# Patient Record
Sex: FEMALE | Race: WHITE | Employment: UNEMPLOYED | URBAN - METROPOLITAN AREA
[De-identification: names, ages, dates, MRNs, and addresses within clinical notes are randomized per-mention and may not be internally consistent; named-entity substitution may affect disease eponyms.]

---

## 2018-01-04 ENCOUNTER — APPOINTMENT (OUTPATIENT)
Dept: GENERAL RADIOLOGY | Age: 83
End: 2018-01-04
Attending: EMERGENCY MEDICINE
Payer: MEDICARE

## 2018-01-04 ENCOUNTER — HOSPITAL ENCOUNTER (OUTPATIENT)
Age: 83
Setting detail: OBSERVATION
Discharge: HOME | End: 2018-01-05
Attending: EMERGENCY MEDICINE | Admitting: HOSPITALIST
Payer: MEDICARE

## 2018-01-04 DIAGNOSIS — R00.1 BRADYCARDIA: ICD-10-CM

## 2018-01-04 DIAGNOSIS — R55 SYNCOPE AND COLLAPSE: Primary | ICD-10-CM

## 2018-01-04 PROBLEM — I10 ESSENTIAL HYPERTENSION: Status: ACTIVE | Noted: 2018-01-04

## 2018-01-04 LAB
ALBUMIN SERPL-MCNC: 3.2 G/DL (ref 3.5–5)
ALBUMIN/GLOB SERPL: 0.7 {RATIO} (ref 1.1–2.2)
ALP SERPL-CCNC: 91 U/L (ref 45–117)
ALT SERPL-CCNC: 34 U/L (ref 12–78)
ANION GAP SERPL CALC-SCNC: 4 MMOL/L (ref 5–15)
APPEARANCE UR: CLEAR
AST SERPL-CCNC: 49 U/L (ref 15–37)
ATRIAL RATE: 49 BPM
BACTERIA URNS QL MICRO: NEGATIVE /HPF
BASOPHILS # BLD: 0 K/UL (ref 0–0.1)
BASOPHILS NFR BLD: 0 % (ref 0–1)
BILIRUB SERPL-MCNC: 0.4 MG/DL (ref 0.2–1)
BILIRUB UR QL: NEGATIVE
BUN SERPL-MCNC: 12 MG/DL (ref 6–20)
BUN/CREAT SERPL: 11 (ref 12–20)
CALCIUM SERPL-MCNC: 8.1 MG/DL (ref 8.5–10.1)
CALCULATED P AXIS, ECG09: 61 DEGREES
CALCULATED R AXIS, ECG10: 1 DEGREES
CALCULATED T AXIS, ECG11: 26 DEGREES
CHLORIDE SERPL-SCNC: 100 MMOL/L (ref 97–108)
CK MB CFR SERPL CALC: NORMAL % (ref 0–2.5)
CK MB CFR SERPL CALC: NORMAL % (ref 0–2.5)
CK MB SERPL-MCNC: <1 NG/ML (ref 5–25)
CK MB SERPL-MCNC: <1 NG/ML (ref 5–25)
CK SERPL-CCNC: 143 U/L (ref 26–192)
CK SERPL-CCNC: 152 U/L (ref 26–192)
CO2 SERPL-SCNC: 30 MMOL/L (ref 21–32)
COLOR UR: ABNORMAL
CREAT SERPL-MCNC: 1.05 MG/DL (ref 0.55–1.02)
DIAGNOSIS, 93000: NORMAL
EOSINOPHIL # BLD: 0 K/UL (ref 0–0.4)
EOSINOPHIL NFR BLD: 0 % (ref 0–7)
EPITH CASTS URNS QL MICRO: ABNORMAL /LPF
ERYTHROCYTE [DISTWIDTH] IN BLOOD BY AUTOMATED COUNT: 13.8 % (ref 11.5–14.5)
GLOBULIN SER CALC-MCNC: 4.6 G/DL (ref 2–4)
GLUCOSE SERPL-MCNC: 117 MG/DL (ref 65–100)
GLUCOSE UR STRIP.AUTO-MCNC: NEGATIVE MG/DL
HCT VFR BLD AUTO: 36.9 % (ref 35–47)
HGB BLD-MCNC: 12.4 G/DL (ref 11.5–16)
HGB UR QL STRIP: ABNORMAL
HYALINE CASTS URNS QL MICRO: ABNORMAL /LPF (ref 0–5)
KETONES UR QL STRIP.AUTO: NEGATIVE MG/DL
LEUKOCYTE ESTERASE UR QL STRIP.AUTO: NEGATIVE
LYMPHOCYTES # BLD: 1.4 K/UL (ref 0.8–3.5)
LYMPHOCYTES NFR BLD: 33 % (ref 12–49)
MAGNESIUM SERPL-MCNC: 2.3 MG/DL (ref 1.6–2.4)
MCH RBC QN AUTO: 29.2 PG (ref 26–34)
MCHC RBC AUTO-ENTMCNC: 33.6 G/DL (ref 30–36.5)
MCV RBC AUTO: 86.8 FL (ref 80–99)
MONOCYTES # BLD: 0.4 K/UL (ref 0–1)
MONOCYTES NFR BLD: 10 % (ref 5–13)
NEUTS SEG # BLD: 2.3 K/UL (ref 1.8–8)
NEUTS SEG NFR BLD: 57 % (ref 32–75)
NITRITE UR QL STRIP.AUTO: NEGATIVE
P-R INTERVAL, ECG05: 212 MS
PH UR STRIP: 6 [PH] (ref 5–8)
PLATELET # BLD AUTO: 156 K/UL (ref 150–400)
POTASSIUM SERPL-SCNC: 3.2 MMOL/L (ref 3.5–5.1)
PROT SERPL-MCNC: 7.8 G/DL (ref 6.4–8.2)
PROT UR STRIP-MCNC: NEGATIVE MG/DL
Q-T INTERVAL, ECG07: 498 MS
QRS DURATION, ECG06: 96 MS
QTC CALCULATION (BEZET), ECG08: 449 MS
RBC # BLD AUTO: 4.25 M/UL (ref 3.8–5.2)
RBC #/AREA URNS HPF: ABNORMAL /HPF (ref 0–5)
SODIUM SERPL-SCNC: 134 MMOL/L (ref 136–145)
SP GR UR REFRACTOMETRY: 1.01 (ref 1–1.03)
TROPONIN I SERPL-MCNC: <0.04 NG/ML
TROPONIN I SERPL-MCNC: <0.04 NG/ML
UA: UC IF INDICATED,UAUC: ABNORMAL
UROBILINOGEN UR QL STRIP.AUTO: 0.2 EU/DL (ref 0.2–1)
VENTRICULAR RATE, ECG03: 49 BPM
WBC # BLD AUTO: 4.1 K/UL (ref 3.6–11)
WBC URNS QL MICRO: ABNORMAL /HPF (ref 0–4)

## 2018-01-04 PROCEDURE — 99218 HC RM OBSERVATION: CPT

## 2018-01-04 PROCEDURE — 84484 ASSAY OF TROPONIN QUANT: CPT | Performed by: EMERGENCY MEDICINE

## 2018-01-04 PROCEDURE — 96360 HYDRATION IV INFUSION INIT: CPT

## 2018-01-04 PROCEDURE — 36415 COLL VENOUS BLD VENIPUNCTURE: CPT | Performed by: EMERGENCY MEDICINE

## 2018-01-04 PROCEDURE — 85025 COMPLETE CBC W/AUTO DIFF WBC: CPT | Performed by: EMERGENCY MEDICINE

## 2018-01-04 PROCEDURE — 81001 URINALYSIS AUTO W/SCOPE: CPT | Performed by: EMERGENCY MEDICINE

## 2018-01-04 PROCEDURE — 71045 X-RAY EXAM CHEST 1 VIEW: CPT

## 2018-01-04 PROCEDURE — 74011250637 HC RX REV CODE- 250/637: Performed by: HOSPITALIST

## 2018-01-04 PROCEDURE — 96361 HYDRATE IV INFUSION ADD-ON: CPT

## 2018-01-04 PROCEDURE — 82550 ASSAY OF CK (CPK): CPT | Performed by: EMERGENCY MEDICINE

## 2018-01-04 PROCEDURE — 83735 ASSAY OF MAGNESIUM: CPT | Performed by: EMERGENCY MEDICINE

## 2018-01-04 PROCEDURE — 80053 COMPREHEN METABOLIC PANEL: CPT | Performed by: EMERGENCY MEDICINE

## 2018-01-04 PROCEDURE — 99285 EMERGENCY DEPT VISIT HI MDM: CPT

## 2018-01-04 PROCEDURE — 74011250637 HC RX REV CODE- 250/637: Performed by: EMERGENCY MEDICINE

## 2018-01-04 PROCEDURE — 74011250636 HC RX REV CODE- 250/636: Performed by: HOSPITALIST

## 2018-01-04 PROCEDURE — 93005 ELECTROCARDIOGRAM TRACING: CPT

## 2018-01-04 RX ORDER — PRAVASTATIN SODIUM 40 MG/1
40 TABLET ORAL
COMMUNITY
End: 2021-12-08

## 2018-01-04 RX ORDER — CELECOXIB 200 MG/1
200 CAPSULE ORAL
COMMUNITY
End: 2021-12-08

## 2018-01-04 RX ORDER — CEFUROXIME AXETIL 250 MG/1
250 TABLET ORAL 2 TIMES DAILY
COMMUNITY
Start: 2018-01-01 | End: 2021-12-08

## 2018-01-04 RX ORDER — CEFUROXIME AXETIL 250 MG/1
250 TABLET ORAL 2 TIMES DAILY
Status: DISCONTINUED | OUTPATIENT
Start: 2018-01-04 | End: 2018-01-04

## 2018-01-04 RX ORDER — ONDANSETRON 2 MG/ML
4 INJECTION INTRAMUSCULAR; INTRAVENOUS
Status: DISCONTINUED | OUTPATIENT
Start: 2018-01-04 | End: 2018-01-05 | Stop reason: HOSPADM

## 2018-01-04 RX ORDER — SODIUM CHLORIDE 0.9 % (FLUSH) 0.9 %
5-10 SYRINGE (ML) INJECTION EVERY 8 HOURS
Status: DISCONTINUED | OUTPATIENT
Start: 2018-01-04 | End: 2018-01-05 | Stop reason: HOSPADM

## 2018-01-04 RX ORDER — POTASSIUM CHLORIDE 750 MG/1
40 TABLET, FILM COATED, EXTENDED RELEASE ORAL
Status: COMPLETED | OUTPATIENT
Start: 2018-01-04 | End: 2018-01-04

## 2018-01-04 RX ORDER — CEFUROXIME AXETIL 250 MG/1
250 TABLET ORAL 2 TIMES DAILY
Status: DISCONTINUED | OUTPATIENT
Start: 2018-01-04 | End: 2018-01-05 | Stop reason: HOSPADM

## 2018-01-04 RX ORDER — ATENOLOL 25 MG/1
25 TABLET ORAL DAILY
COMMUNITY
End: 2018-01-05

## 2018-01-04 RX ORDER — FUROSEMIDE 40 MG/1
40 TABLET ORAL
COMMUNITY
End: 2021-12-08

## 2018-01-04 RX ORDER — ACETAMINOPHEN 325 MG/1
650 TABLET ORAL
Status: DISCONTINUED | OUTPATIENT
Start: 2018-01-04 | End: 2018-01-05 | Stop reason: HOSPADM

## 2018-01-04 RX ORDER — VALSARTAN 40 MG/1
40 TABLET ORAL DAILY
Status: DISCONTINUED | OUTPATIENT
Start: 2018-01-05 | End: 2018-01-05 | Stop reason: HOSPADM

## 2018-01-04 RX ORDER — PRAVASTATIN SODIUM 40 MG/1
40 TABLET ORAL
Status: DISCONTINUED | OUTPATIENT
Start: 2018-01-04 | End: 2018-01-05 | Stop reason: HOSPADM

## 2018-01-04 RX ORDER — SODIUM CHLORIDE 0.9 % (FLUSH) 0.9 %
5-10 SYRINGE (ML) INJECTION AS NEEDED
Status: DISCONTINUED | OUTPATIENT
Start: 2018-01-04 | End: 2018-01-05 | Stop reason: HOSPADM

## 2018-01-04 RX ORDER — ASPIRIN 81 MG/1
81 TABLET ORAL DAILY
Status: DISCONTINUED | OUTPATIENT
Start: 2018-01-05 | End: 2018-01-05 | Stop reason: HOSPADM

## 2018-01-04 RX ORDER — CELECOXIB 100 MG/1
200 CAPSULE ORAL
Status: DISCONTINUED | OUTPATIENT
Start: 2018-01-04 | End: 2018-01-05 | Stop reason: HOSPADM

## 2018-01-04 RX ORDER — ASPIRIN 81 MG/1
81 TABLET ORAL DAILY
COMMUNITY
End: 2021-12-08

## 2018-01-04 RX ORDER — NYSTATIN 100000 [USP'U]/G
POWDER TOPICAL 2 TIMES DAILY
Status: DISCONTINUED | OUTPATIENT
Start: 2018-01-04 | End: 2018-01-05 | Stop reason: HOSPADM

## 2018-01-04 RX ORDER — POTASSIUM CHLORIDE 750 MG/1
40 TABLET, FILM COATED, EXTENDED RELEASE ORAL
Status: DISCONTINUED | OUTPATIENT
Start: 2018-01-04 | End: 2018-01-04

## 2018-01-04 RX ADMIN — Medication 10 ML: at 21:45

## 2018-01-04 RX ADMIN — PRAVASTATIN SODIUM 40 MG: 40 TABLET ORAL at 21:45

## 2018-01-04 RX ADMIN — CEFUROXIME AXETIL 250 MG: 250 TABLET ORAL at 21:45

## 2018-01-04 RX ADMIN — NYSTATIN: 100000 POWDER TOPICAL at 13:24

## 2018-01-04 RX ADMIN — POTASSIUM CHLORIDE 40 MEQ: 750 TABLET, FILM COATED, EXTENDED RELEASE ORAL at 10:20

## 2018-01-04 RX ADMIN — CEFUROXIME AXETIL 250 MG: 250 TABLET ORAL at 13:24

## 2018-01-04 RX ADMIN — SODIUM CHLORIDE 500 ML: 900 INJECTION, SOLUTION INTRAVENOUS at 12:16

## 2018-01-04 NOTE — H&P
Hospitalist Admission Note    NAME: Mia Campos   :  1933   MRN:  [id number]     Date/Time:  2018 10:44 AM    Patient PCP: None local  ______________________________________________________________________   Assessment & Plan:  Near syncope, suspect vasovagal   Bradycardia HR in 40s initially, has improved to upper 50s and 60s since without intervention, on atenolol chronically (dose was decreased from 50 to 25mg over a year ago)  Hypokalemia, 3.2  Intertrigo breast folds. Recent shingles under left breast  Recent sinus infection, on cefuroxime  Chronic intermittent diarrhea  HTN  Hyperlipidemia  Bilateral knee arthritis with chronic b/l lower leg edema x 1 year. Currently minimal edema    --observation, monitor on tele. Not orthostatic. --Stop atenolol (already took dose today). Replace with losartan  --echo, cardiology consult, check tsh  --kdur 40meq  --continue cefuroxime  --continue pravastatin, aspirin, celecoxib prn.  --nystatin    Body mass index is 30.65 kg/(m^2). DVT prophylaxis: SCD  Surrogate decision maker:  Daughter Gracie Jordan        Subjective:   CHIEF COMPLAINT:  Near syncope, weakness    HISTORY OF PRESENT ILLNESS:     Mia Campos is a 80 y.o.  female from [place], [place] accompanied by daughter Gracie Jordan and son in law with whom she lives. Patient with HTN, hyperlipidemia, b/l knee arthritis causing lower leg edema and on lasix prn who flew to [place] on [place]. Two days later got sick with purulent nasal discharge, cough with yellow sputum. Started on cefuroxime by [facility] on 2018 for 10 days. Appetite poor with little PO intake since URI began with throat and tongue been feeling very dry and burning discomfort on tongue and stomach. Patient was boarding plane with daughter and son in law, walking to back of plane when felt her mouth was very dry and complained of being very hot.   Became nauseated and vomited x 1 the half energy bar she had eaten earlier and was lightheaded, felt like she was going to faint and threw water on her face to prevent from passing out. Has problem with passing out whenever she vomits since teenager. Family said she was very pale, turning head side to side. Did not pass out completely. Assessed by a doctor on plane and felt not fit to fly. She complains of feeling very tired and weak. Per son in law, also complained of some abdominal pain earlier as well. Currently having some loose stool (chronic) for which she takes Pepto Bismol frequently. EMS reported heart rate was in 40s but blood pressure normal and patient able to answer questions. No seizure activity. No CP, SOB. Seen in ER in 2015 for similar symptoms. On arrival, HR 49 but since HR slowly improving to 50s and low 60s. Denies any hx of MI, CHF, seizure, stroke/TIA. Past Medical History:   Diagnosis Date    Hyperlipidemia     Hypertension     Uterine cancer ([address] 1998      Past Surgical History:   Procedure Laterality Date    HX HYSTERECTOMY       Social History   Substance Use Topics    Smoking status: Never Smoker    Smokeless tobacco: Not on file    Alcohol use No   Lives with daughter Paulina Sierra and son in law in [place], [place]      Family History   Problem Relation Age of Onset    Tuberculosis Mother     Tuberculosis Father      Allergies   Allergen Reactions    Penicillins Other (comments)     Isn't karthikeyan to recall recalls      Seafood Hives    Sulfa (Sulfonamide Antibiotics) Other (comments)     Isn't able to recall reaction at this time. Prior to Admission medications    Medication Sig Start Date End Date Taking? Authorizing Provider   pravastatin (PRAVACHOL) 40 mg tablet Take 40 mg by mouth nightly. Yes Historical Provider   atenolol (TENORMIN) 25 mg tablet Take 25 mg by mouth daily. Yes Historical Provider   furosemide (LASIX) 40 mg tablet Take 40 mg by mouth daily as needed for Other (swelling).    Yes Historical Provider   celecoxib (CELEBREX) 200 mg capsule Take 200 mg by mouth daily as needed for Pain. Yes Historical Provider   cefUROXime (CEFTIN) 250 mg tablet Take 250 mg by mouth two (2) times a day. 18  Yes Historical Provider   aspirin delayed-release 81 mg tablet Take 81 mg by mouth daily. Yes Historical Provider   ondansetron hcl (ZOFRAN, AS HYDROCHLORIDE,) 4 mg tablet Take 1 Tab by mouth every eight (8) hours as needed for Nausea. 12/23/15   Aiyana Sneed MD   meclizine (ANTIVERT) 25 mg tablet Take 1 Tab by mouth three (3) times daily as needed for Dizziness. 12/23/15   Aiyana Sneed MD   promethazine (PHENERGAN) 25 mg suppository Insert 1 Suppository into rectum every six (6) hours as needed for Nausea. 12/23/15   Aiyana Sneed MD     REVIEW OF SYSTEMS:  POSITIVE= Bold.   Negative = normal text  General:  fever, chills, sweats, generalized weakness, weight loss/gain, loss of appetite  Eyes:  blurred vision, eye pain, loss of vision, diplopia  Ear Nose and Throat:  rhinorrhea, pharyngitis, nasal congestion and discharge  Respiratory:   cough, sputum production, SOB, wheezing, BALTAZAR, pleuritic pain  Cardiology:  chest pain, palpitations, orthopnea, PND, edema, syncope   Gastrointestinal:  abdominal pain, N/V, dysphagia, diarrhea, constipation, bleeding  Genitourinary:  frequency, urgency, dysuria, hematuria, incontinence  Muskuloskeletal :  Arthralgia--knees, myalgia  Hematology:  easy bruising, bleeding, lymphadenopathy  Dermatological:  rash, ulceration, pruritis  Endocrine:  hot flashes or polydipsia  Neurological:  headache, dizziness, confusion, focal weakness, paresthesia, memory loss, gait disturbance  Psychological: anxiety, depression, agitation      Objective:   VITALS:    Visit Vitals    /50    Pulse (!) 50    Temp 98.2 °F (36.8 °C)    Resp 15    Wt 81 kg (178 lb 9.2 oz)    SpO2 96%    BMI 30.65 kg/m2     Temp (24hrs), Av.2 °F (36.8 °C), Min:98.2 °F (36.8 °C), Max:98.2 °F (36.8 °C)    Body mass index is 30.65 kg/(m^2). PHYSICAL EXAM:    General:    Alert, well nourished female, cooperative, no distress, appears stated age. HEENT: Atraumatic, anicteric sclerae, pink conjunctivae     No oral ulcers, dry tongue, throat clear. Hearing intact. Neck:  Supple, symmetrical,  thyroid: non tender. No bruit. No lymphadenopathy  Lungs:   Clear to auscultation bilaterally. No Wheezing or Rhonchi. No rales. Chest wall:  No tenderness  No Accessory muscle use. Heart:   Regular  rhythm,  No  murmur   No gallop. No edema. Abdomen:   Soft, non-tender. Not distended. Bowel sounds normal. No masses  Extremities: No cyanosis. No clubbing. Trace swelling over b/l patellar joints  Skin:     Not pale Not Jaundiced +intertrigo under left breast, mild intertrigo starting under right breast.  Several seborrheic keratoses on chest and back  Psych:  Good insight. Not depressed. Not anxious or agitated. Neurologic: EOMs intact. No facial asymmetry. No aphasia or slurred speech. Symmetrical strength, Alert and oriented X 3.      IMAGING RESULTS:   []       I have personally reviewed the actual   []     CXR  []     CT scan  CXR:  CT :  EKG:  Sinus samira 49, 1st AV block, LVH with repolarization  ________________________________________________________________________  Care Plan discussed with:    Comments   Patient y    Family  y Daughter and son in law   RN     Care Manager                    Consultant:      ________________________________________________________________________  Prophylaxis:  GI none   DVT SCD   ________________________________________________________________________  Recommended Disposition:   Home with Family y   HH/PT/OT/RN    SNF/LTC    MICHAEL    ________________________________________________________________________  Code Status:  Full Code y   DNR/DNI    ________________________________________________________________________  TOTAL TIME:  60 minutes      Comments    y Reviewed previous records   ______________________________________________________________________  Phoebe Damon MD      Procedures: see electronic medical records for all procedures/Xrays and details which were not copied into this note but were reviewed prior to creation of Plan. LAB DATA REVIEWED:    Recent Results (from the past 24 hour(s))   EKG, 12 LEAD, INITIAL    Collection Time: 01/04/18  8:43 AM   Result Value Ref Range    Ventricular Rate 49 BPM    Atrial Rate 49 BPM    P-R Interval 212 ms    QRS Duration 96 ms    Q-T Interval 498 ms    QTC Calculation (Bezet) 449 ms    Calculated P Axis 61 degrees    Calculated R Axis 1 degrees    Calculated T Axis 26 degrees    Diagnosis       Sinus bradycardia with 1st degree AV block  Left ventricular hypertrophy with repolarization abnormality    When compared with ECG of 22-DEC-2015 21:10,  No significant change was found  Confirmed by Karma Hernandez ([id number]) on 1/4/2018 9:29:28 AM     CBC WITH AUTOMATED DIFF    Collection Time: 01/04/18  8:52 AM   Result Value Ref Range    WBC 4.1 3.6 - 11.0 K/uL    RBC 4.25 3.80 - 5.20 M/uL    HGB 12.4 11.5 - 16.0 g/dL    HCT 36.9 35.0 - 47.0 %    MCV 86.8 80.0 - 99.0 FL    MCH 29.2 26.0 - 34.0 PG    MCHC 33.6 30.0 - 36.5 g/dL    RDW 13.8 11.5 - 14.5 %    PLATELET 832 816 - 891 K/uL    NEUTROPHILS 57 32 - 75 %    LYMPHOCYTES 33 12 - 49 %    MONOCYTES 10 5 - 13 %    EOSINOPHILS 0 0 - 7 %    BASOPHILS 0 0 - 1 %    ABS. NEUTROPHILS 2.3 1.8 - 8.0 K/UL    ABS. LYMPHOCYTES 1.4 0.8 - 3.5 K/UL    ABS. MONOCYTES 0.4 0.0 - 1.0 K/UL    ABS. EOSINOPHILS 0.0 0.0 - 0.4 K/UL    ABS.  BASOPHILS 0.0 0.0 - 0.1 K/UL   METABOLIC PANEL, COMPREHENSIVE    Collection Time: 01/04/18  8:52 AM   Result Value Ref Range    Sodium 134 (L) 136 - 145 mmol/L    Potassium 3.2 (L) 3.5 - 5.1 mmol/L    Chloride 100 97 - 108 mmol/L    CO2 30 21 - 32 mmol/L    Anion gap 4 (L) 5 - 15 mmol/L    Glucose 117 (H) 65 - 100 mg/dL    BUN 12 6 - 20 MG/DL    Creatinine 1.05 (H) 0.55 - 1.02 MG/DL    BUN/Creatinine ratio 11 (L) 12 - 20      GFR est AA >60 >60 ml/min/1.73m2    GFR est non-AA 50 (L) >60 ml/min/1.73m2    Calcium 8.1 (L) 8.5 - 10.1 MG/DL    Bilirubin, total 0.4 0.2 - 1.0 MG/DL    ALT (SGPT) 34 12 - 78 U/L    AST (SGOT) 49 (H) 15 - 37 U/L    Alk.  phosphatase 91 45 - 117 U/L    Protein, total 7.8 6.4 - 8.2 g/dL    Albumin 3.2 (L) 3.5 - 5.0 g/dL    Globulin 4.6 (H) 2.0 - 4.0 g/dL    A-G Ratio 0.7 (L) 1.1 - 2.2     CK W/ CKMB & INDEX    Collection Time: 01/04/18  8:52 AM   Result Value Ref Range     26 - 192 U/L    CK - MB <1.0 <3.6 NG/ML    CK-MB Index Cannot be calculated 0 - 2.5     MAGNESIUM    Collection Time: 01/04/18  8:52 AM   Result Value Ref Range    Magnesium 2.3 1.6 - 2.4 mg/dL   TROPONIN I    Collection Time: 01/04/18  8:52 AM   Result Value Ref Range    Troponin-I, Qt. <0.04 <0.05 ng/mL

## 2018-01-04 NOTE — IP AVS SNAPSHOT
Summary of Care Report The Summary of Care report has been created to help improve care coordination. Users with access to [employer] or [address] (Web-based application) may access additional patient information including the Discharge Summary. If you are not currently a [address] user and need more information, please call the number listed below in the [place] [address] section and ask to be connected with Medical Records. Facility Information Name Address Phone [address] [address] [phone] Patient Information Patient Name Sex  Jolynn Mcmahan ([id number]) Female 1933 Discharge Information Admitting Provider Service Area Unit  
 Landon Nogueira MD / [phone] [facility] 2 General Surgery / [phone] Discharge Provider Discharge Date/Time Discharge Disposition Destination (none) 2018 (Pending) AHR (none) Patient Language Language ENGLISH [13] Hospital Problems as of 2018  Reviewed: 2018  7:35 AM by Landon Nogueira MD  
  
  
  
 Class Noted - Resolved Last Modified POA Active Problems Essential hypertension  2018 - Present 2018 by Landon Nogueira MD Yes Entered by Cynthia Iverson MD  
  Sinus bradycardia  2018 - Present 2018 by Landon Nogueira MD Yes Entered by Landon Nogueira MD  
  * (Principal)Near syncope  2018 - Present 2018 by Landon Nogueira MD Yes Entered by Landon Nogueira MD  
  
Non-Hospital Problems as of 2018  Reviewed: 2018  7:35 AM by Landon Nogueira MD  
 None You are allergic to the following Allergen Reactions Seafood Hives  
 shellfish  
    
 Sulfa (Sulfonamide Antibiotics) Other (comments) Isn't able to recall reaction at this time.    
  
  
Current Discharge Medication List  
  
 START taking these medications Dose & Instructions Dispensing Information Comments  
 potassium chloride SR 20 mEq tablet Commonly known as:  K-TAB Dose:  20 mEq Take 1 Tab by mouth daily. Quantity:  5 Tab Refills:  0  
   
 valsartan 40 mg tablet Commonly known as:  DIOVAN Start taking on:  1/6/2018 Dose:  40 mg Take 1 Tab by mouth daily. Quantity:  30 Tab Refills:  0 CONTINUE these medications which have NOT CHANGED Dose & Instructions Dispensing Information Comments  
 aspirin delayed-release 81 mg tablet Dose:  81 mg Take 81 mg by mouth daily. Refills:  0  
   
 cefUROXime 250 mg tablet Commonly known as:  CEFTIN Dose:  250 mg Take 250 mg by mouth two (2) times a day. Refills:  0  
   
 celecoxib 200 mg capsule Commonly known as:  CELEBREX Dose:  200 mg Take 200 mg by mouth daily as needed for Pain. Refills:  0  
   
 LASIX 40 mg tablet Generic drug:  furosemide Dose:  40 mg Take 40 mg by mouth daily as needed for Other (swelling). Refills:  0  
   
 pravastatin 40 mg tablet Commonly known as:  PRAVACHOL Dose:  40 mg Take 40 mg by mouth nightly. Refills:  0 STOP taking these medications Comments  
 atenolol 25 mg tablet Commonly known as:  TENORMIN Follow-up Information Follow up With Details Comments Contact Info Provider Unknown   Patient not available to ask Discharge Instructions None Chart Review Routing History Recipient Method Report Sent By Merlin Larsen Provider Shaila, MD  
Patient not available to ask [address] [facility] IP Auto Routed Notes Judith Figueroa MD [[id number]] 1/5/2018  1:03 PM 01/05/2018 Provider Unknown, MD  
Patient not available to ask [address] [facility] IP Auto Routed Notes Judith Figueroa MD [[id number]] 1/5/2018  1:04 PM 01/05/2018

## 2018-01-04 NOTE — CONSULTS
Subjective:      Date of  Admission: 1/4/2018  8:36 AM     Admission type:Emergency    Mia Campos is a 80 y.o. female came to ER with c/o feeling dizzy, presyncope that happened while about to fly to [place]. Has been feeling sick for last wk or so and has been on abx. Visiting from [place] [id number]. Felt nauseous and vomited once. Similar to previous episodes. Feels great now other than URI. Never lost consciousness. She denies any other cardiac symptoms. Reportedly on arrival to ER found to have heart rate in 40's with normal hemodynamics. Has been on atenolol for BP. Patient Active Problem List    Diagnosis Date Noted    Syncope 01/04/2018      Not On File [facility]  Past Medical History:   Diagnosis Date    Hyperlipidemia     Hypertension     [address]ine cancer ([facility] [address] 1998      Past Surgical History:   Procedure Laterality Date    HX HYSTERECTOMY       Allergies   Allergen Reactions    Seafood Hives     shellfish    Sulfa (Sulfonamide Antibiotics) Other (comments)     Isn't able to recall reaction at this time.        Family History   Problem Relation Age of Onset    Tuberculosis Mother     Tuberculosis Father       Current Facility-Administered Medications   Medication Dose Route Frequency    sodium chloride (NS) flush 5-10 mL  5-10 mL IntraVENous Q8H    sodium chloride (NS) flush 5-10 mL  5-10 mL IntraVENous PRN    acetaminophen (TYLENOL) tablet 650 mg  650 mg Oral Q4H PRN    ondansetron (ZOFRAN) injection 4 mg  4 mg IntraVENous Q6H PRN    nystatin (MYCOSTATIN) 100,000 unit/gram powder   Topical BID    [START ON 1/5/2018] aspirin delayed-release tablet 81 mg  81 mg Oral DAILY    celecoxib (CELEBREX) capsule 200 mg  200 mg Oral DAILY PRN    pravastatin (PRAVACHOL) tablet 40 mg  40 mg Oral QHS    cefUROXime (CEFTIN) tablet 250 mg  250 mg Oral BID    [START ON 1/5/2018] valsartan (DIOVAN) tablet 40 mg  40 mg Oral DAILY    benzocaine-menthol (CHLORASEPTIC MAX) lozenge 1 Lozenge  1 Lozenge Oral Q2H PRN         Review of Symptoms:  Constitutional: negative  Eyes: negative  Ears, nose, mouth, throat, and face: negative  Respiratory: No exertional dyspnea, orthopnea, PND. Cardiovascular: No CP, palpitations, sweating, lower extremity swelling. Gastrointestinal: No nausea, vomiting, diarrhea, constipation, abdominal pain, hematemesis, melena, hematochezia  Genitourinary:No urinary complaints. Musculoskeletal:negative  Neurological: negative  Behvioral/Psych: negative  Endocrine: negative     Subjective:      Visit Vitals    /49    Pulse 65    Temp 98.2 °F (36.8 °C)    Resp 16    Wt 178 lb 9.2 oz (81 kg)    SpO2 99%    BMI 30.65 kg/m2       Physical Exam  Abdomen: soft, non-tender.  Bowel sounds normal.   Extremities: no cyanosis or edema  Heart: regular rate and rhythm, S1, S2 normal, no murmur, click, rub or gallop  Lungs: clear to auscultation bilaterally  Neck: supple, no carotid bruit and no JVD  Neurologic: Grossly normal  Pulses: 2+       Cardiographics    Telemetry: normal sinus rhythm    ECG: normal sinus rhythm, LVH    Echocardiogram: Not done    Labs:   Recent Results (from the past 24 hour(s))   EKG, 12 LEAD, INITIAL    Collection Time: 01/04/18  8:43 AM   Result Value Ref Range    Ventricular Rate 49 BPM    Atrial Rate 49 BPM    P-R Interval 212 ms    QRS Duration 96 ms    Q-T Interval 498 ms    QTC Calculation (Bezet) 449 ms    Calculated P Axis 61 degrees    Calculated R Axis 1 degrees    Calculated T Axis 26 degrees    Diagnosis       Sinus bradycardia with 1st degree AV block  Left ventricular hypertrophy with repolarization abnormality    When compared with ECG of 22-DEC-2015 21:10,  No significant change was found  Confirmed by Elayne Martinez ([id number]) on 1/4/2018 9:29:28 AM     CBC WITH AUTOMATED DIFF    Collection Time: 01/04/18  8:52 AM   Result Value Ref Range    WBC 4.1 3.6 - 11.0 K/uL    RBC 4.25 3.80 - 5.20 M/uL    HGB 12.4 11.5 - 16.0 g/dL    HCT 36.9 35.0 - 47.0 %    MCV 86.8 80.0 - 99.0 FL    MCH 29.2 26.0 - 34.0 PG    MCHC 33.6 30.0 - 36.5 g/dL    RDW 13.8 11.5 - 14.5 %    PLATELET 114 882 - 091 K/uL    NEUTROPHILS 57 32 - 75 %    LYMPHOCYTES 33 12 - 49 %    MONOCYTES 10 5 - 13 %    EOSINOPHILS 0 0 - 7 %    BASOPHILS 0 0 - 1 %    ABS. NEUTROPHILS 2.3 1.8 - 8.0 K/UL    ABS. LYMPHOCYTES 1.4 0.8 - 3.5 K/UL    ABS. MONOCYTES 0.4 0.0 - 1.0 K/UL    ABS. EOSINOPHILS 0.0 0.0 - 0.4 K/UL    ABS. BASOPHILS 0.0 0.0 - 0.1 K/UL   METABOLIC PANEL, COMPREHENSIVE    Collection Time: 01/04/18  8:52 AM   Result Value Ref Range    Sodium 134 (L) 136 - 145 mmol/L    Potassium 3.2 (L) 3.5 - 5.1 mmol/L    Chloride 100 97 - 108 mmol/L    CO2 30 21 - 32 mmol/L    Anion gap 4 (L) 5 - 15 mmol/L    Glucose 117 (H) 65 - 100 mg/dL    BUN 12 6 - 20 MG/DL    Creatinine 1.05 (H) 0.55 - 1.02 MG/DL    BUN/Creatinine ratio 11 (L) 12 - 20      GFR est AA >60 >60 ml/min/1.73m2    GFR est non-AA 50 (L) >60 ml/min/1.73m2    Calcium 8.1 (L) 8.5 - 10.1 MG/DL    Bilirubin, total 0.4 0.2 - 1.0 MG/DL    ALT (SGPT) 34 12 - 78 U/L    AST (SGOT) 49 (H) 15 - 37 U/L    Alk.  phosphatase 91 45 - 117 U/L    Protein, total 7.8 6.4 - 8.2 g/dL    Albumin 3.2 (L) 3.5 - 5.0 g/dL    Globulin 4.6 (H) 2.0 - 4.0 g/dL    A-G Ratio 0.7 (L) 1.1 - 2.2     CK W/ CKMB & INDEX    Collection Time: 01/04/18  8:52 AM   Result Value Ref Range     26 - 192 U/L    CK - MB <1.0 <3.6 NG/ML    CK-MB Index Cannot be calculated 0 - 2.5     MAGNESIUM    Collection Time: 01/04/18  8:52 AM   Result Value Ref Range    Magnesium 2.3 1.6 - 2.4 mg/dL   TROPONIN I    Collection Time: 01/04/18  8:52 AM   Result Value Ref Range    Troponin-I, Qt. <0.04 <0.05 ng/mL   URINALYSIS W/ REFLEX CULTURE    Collection Time: 01/04/18  1:11 PM   Result Value Ref Range    Color YELLOW/STRAW      Appearance CLEAR CLEAR      Specific gravity 1.013 1.003 - 1.030      pH (UA) 6.0 5.0 - 8.0      Protein NEGATIVE  NEG mg/dL    Glucose NEGATIVE  NEG mg/dL    Ketone NEGATIVE  NEG mg/dL Bilirubin NEGATIVE  NEG      Blood TRACE (A) NEG      Urobilinogen 0.2 0.2 - 1.0 EU/dL    Nitrites NEGATIVE  NEG      Leukocyte Esterase NEGATIVE  NEG      WBC 0-4 0 - 4 /hpf    RBC 10-20 0 - 5 /hpf    Epithelial cells FEW FEW /lpf    Bacteria NEGATIVE  NEG /hpf    UA:UC IF INDICATED CULTURE NOT INDICATED BY UA RESULT CNI      Hyaline cast 0-2 0 - 5 /lpf   CK W/ CKMB & INDEX    Collection Time: 01/04/18  4:03 PM   Result Value Ref Range     26 - 192 U/L    CK - MB <1.0 <3.6 NG/ML    CK-MB Index Cannot be calculated 0 - 2.5     TROPONIN I    Collection Time: 01/04/18  4:03 PM   Result Value Ref Range    Troponin-I, Qt. <0.04 <0.05 ng/mL        Assessment:     Assessment:       Active Problems:    Syncope (1/4/2018)         Plan:     1. Near syncope: vasovagal. No EKG changes. -ve cardiac enzymes. -ve orthostatic. Echo pending. Tele. Agree with switching atenolol and avoid -ve chronotropic meds. 2. HTN: monitor BP. On losartan now. 3. On statin.

## 2018-01-04 NOTE — IP AVS SNAPSHOT
[address] [facility] 
[phone] Patient: Jason Menjivar MRN: [id number] About your hospitalization You were admitted on:  January 4, 2018 You last received care in the:  [facility] 2 GENERAL SURGERY You were discharged on:  January 5, 2018 Why you were hospitalized Your primary diagnosis was:  Near Syncope Your diagnoses also included:  Essential Hypertension, Sinus Bradycardia Follow-up Information Follow up With Details Comments Contact Info Provider Unknown   Patient not available to ask Discharge Orders None A check hubert indicates which time of day the medication should be taken. My Medications START taking these medications Instructions Each Dose to Equal  
 Morning Noon Evening Bedtime  
 potassium chloride SR 20 mEq tablet Commonly known as:  K-TAB Your last dose was: Your next dose is: Take 1 Tab by mouth daily. 20 mEq  
    
   
   
   
  
 valsartan 40 mg tablet Commonly known as:  DIOVAN Start taking on:  1/6/2018 Your last dose was: Your next dose is: Take 1 Tab by mouth daily. 40 mg CONTINUE taking these medications Instructions Each Dose to Equal  
 Morning Noon Evening Bedtime  
 aspirin delayed-release 81 mg tablet Your last dose was: Your next dose is: Take 81 mg by mouth daily. 81 mg  
    
   
   
   
  
 cefUROXime 250 mg tablet Commonly known as:  CEFTIN Your last dose was: Your next dose is: Take 250 mg by mouth two (2) times a day. 250 mg  
    
   
   
   
  
 celecoxib 200 mg capsule Commonly known as:  CELEBREX Your last dose was: Your next dose is: Take 200 mg by mouth daily as needed for Pain. 200 mg  
    
   
   
   
  
 LASIX 40 mg tablet Generic drug:  furosemide Your last dose was: Your next dose is: Take 40 mg by mouth daily as needed for Other (swelling). 40 mg  
    
   
   
   
  
 pravastatin 40 mg tablet Commonly known as:  PRAVACHOL Your last dose was: Your next dose is: Take 40 mg by mouth nightly. 40 mg  
    
   
   
   
  
  
STOP taking these medications   
 atenolol 25 mg tablet Commonly known as:  TENORMIN Where to Get Your Medications Information on where to get these meds will be given to you by the nurse or doctor. ! Ask your nurse or doctor about these medications  
  potassium chloride SR 20 mEq tablet  
 valsartan 40 mg tablet Discharge Instructions None [employer] Announcement We are excited to announce that we are making your provider's discharge notes available to you in [employer]. You will see these notes when they are completed and signed by the physician that discharged you from your recent hospital stay. If you have any questions or concerns about any information you see in [employer], please call the Health Information Department where you were seen or reach out to your Primary Care Provider for more information about your plan of care. Introducing [facility]! Brandon Muhammad introduces [employer] patient portal. Now you can access parts of your medical record, email your doctor's office, and request medication refills online. 1. In your internet browser, go to [email or web address] 2. Click on the First Time User? Click Here link in the Sign In box. You will see the New Member Sign Up page. 3. Enter your [employer] Access Code exactly as it appears below. You will not need to use this code after youve completed the sign-up process. If you do not sign up before the expiration date, you must request a new code. · [employer] Access Code: [email or web address] Expires: 4/4/2018  8:51 AM 
 
4. Enter the last four digits of your Social Security Number (xxxx) and Date of Birth (mm/dd/yyyy) as indicated and click Submit. You will be taken to the next sign-up page. 5. Create a [employer] ID. This will be your [employer] login ID and cannot be changed, so think of one that is secure and easy to remember. 6. Create a [employer] password. You can change your password at any time. 7. Enter your Password Reset Question and Answer. This can be used at a later time if you forget your password. 8. Enter your e-mail address. You will receive e-mail notification when new information is available in [address] 9. Click Sign Up. You can now view and download portions of your medical record. 10. Click the Download Summary menu link to download a portable copy of your medical information. If you have questions, please visit the Frequently Asked Questions section of the [employer] website. Remember, [employer] is NOT to be used for urgent needs. For medical emergencies, dial 911. Now available from your iPhone and Android! Providers Seen During Your Hospitalization Provider Specialty Primary office phone Deep Reyes MD Emergency Medicine [phone] Cheli Moffett MD Internal Medicine [phone] Eleazar Burciaga MD Internal Medicine [phone] Your Primary Care Physician (PCP) Primary Care Physician Office Phone Office Fax UNKNOWN, PROVIDER ** None ** ** None ** You are allergic to the following Allergen Reactions Seafood Hives  
 shellfish  
    
 Sulfa (Sulfonamide Antibiotics) Other (comments) Isn't able to recall reaction at this time. Recent Documentation Weight BMI Smoking Status 81 kg 30.65 kg/m2 Never Smoker Emergency Contacts Name Discharge Info Relation Home Work Mobile Delaney Her CAREGIVER [3] Child [2] [phone] Patient Belongings The following personal items are in your possession at time of discharge: 
     Visual Aid: None Please provide this summary of care documentation to your next provider. Signatures-by signing, you are acknowledging that this After Visit Summary has been reviewed with you and you have received a copy. Patient Signature:  ____________________________________________________________ Date:  ____________________________________________________________  
  
Lalit Lion Provider Signature:  ____________________________________________________________ Date:  ____________________________________________________________

## 2018-01-04 NOTE — ED PROVIDER NOTES
EMERGENCY DEPARTMENT HISTORY AND PHYSICAL EXAM      Date: 1/4/2018  Patient Name: Herson Allen    History of Presenting Illness     Chief Complaint   Patient presents with    Syncope     [place] at airport. denies pain. per ems, HR in 40s. vomited once       History Provided By: Patient and Patient's Daughter and EMS    HPI: Herson Allen, 80 y.o. female with PMHx significant for HTN, presents via EMS to the ED with cc of episode of abrupt onset lightheadedness, nausea, one episode non-bloody non-bilious emesis with subsequent syncope at ~0645. Pt states she is visiting from [place] and was waiting for her flight to take off; daughter notes the cabin was warm and endorses fanning the pt. She states she felt her mother was minimally responsive, however pt explains she was aware of her surroundings. Pt reports congestion, malaise, productive cough with thick white sputum, and decreased appetite with poor solid/liquid PO intake onset 12/27, noting she saw a doctor and was started on an antibiotic on 1/1. She notes several episodes of diarrhea x 2 days, for which she was taking Pepto Bismol, with the last episode yesterday. Per chart review, pt was seen at [facility] ED on 12/22/2015 for similar sx's with an unremarkable workup; she denies any issues since that time. She denies cardiac hx and states she is not typically bradycardic; EMS states pt's heart rate was in the low 40's en route to the ED. Pt denies tobacco, EtOH, and illicit drug use. She specifically denies any fever, hemoptysis, chest pain, SOB, headache, visual disturbances, numbness, and tingling. PCP: Not On File B[facility]    There are no other complaints, changes, or physical findings at this time.     Current Facility-Administered Medications   Medication Dose Route Frequency Provider Last Rate Last Dose    sodium chloride (NS) flush 5-10 mL  5-10 mL IntraVENous Q8H Wilfrid Urbina MD        sodium chloride (NS) flush 5-10 mL  5-10 mL IntraVENous PRN Josette Obrien MD        acetaminophen (TYLENOL) tablet 650 mg  650 mg Oral Q4H PRN Josette Obrien MD        ondansetron [facility]) injection 4 mg  4 mg IntraVENous Q6H PRN Josette Obrien MD        potassium chloride SR (KLOR-CON 10) tablet 40 mEq  40 mEq Oral NOW Josette Obrien MD        nystatin (MYCOSTATIN) 100,000 unit/gram powder   Topical BID Josette Obrine MD        [START ON 1/5/2018] aspirin delayed-release tablet 81 mg  81 mg Oral DAILY Josette Obrien MD        celecoxib (CELEBREX) capsule 200 mg  200 mg Oral DAILY PRN Josette Obrien MD        pravastatin (PRAVACHOL) tablet 40 mg  40 mg Oral QHS Josette Obrien MD        cefUROXime (CEFTIN) tablet 250 mg  250 mg Oral BID Josette Obrien MD         Current Outpatient Prescriptions   Medication Sig Dispense Refill    pravastatin (PRAVACHOL) 40 mg tablet Take 40 mg by mouth nightly.  atenolol (TENORMIN) 25 mg tablet Take 25 mg by mouth daily.  furosemide (LASIX) 40 mg tablet Take 40 mg by mouth daily as needed for Other (swelling).  celecoxib (CELEBREX) 200 mg capsule Take 200 mg by mouth daily as needed for Pain.  cefUROXime (CEFTIN) 250 mg tablet Take 250 mg by mouth two (2) times a day.  aspirin delayed-release 81 mg tablet Take 81 mg by mouth daily.  ondansetron hcl (ZOFRAN, AS HYDROCHLORIDE,) 4 mg tablet Take 1 Tab by mouth every eight (8) hours as needed for Nausea. 20 Tab 0    meclizine (ANTIVERT) 25 mg tablet Take 1 Tab by mouth three (3) times daily as needed for Dizziness. 20 Tab 0    promethazine (PHENERGAN) 25 mg suppository Insert 1 Suppository into rectum every six (6) hours as needed for Nausea.  12 Suppository 0       Past History     Past Medical History:  Past Medical History:   Diagnosis Date    Hyperlipidemia     Hypertension     Uterine cancer ([facility] [address] 1998       Past Surgical History:  Past Surgical History:   Procedure Laterality Date    HX HYSTERECTOMY         Family History:  Family History   Problem Relation Age of Onset    Tuberculosis Mother     Tuberculosis Father        Social History:  Social History   Substance Use Topics    Smoking status: Never Smoker    Smokeless tobacco: Never Used    Alcohol use No       Allergies: Allergies   Allergen Reactions    Seafood Hives     shellfish    Sulfa (Sulfonamide Antibiotics) Other (comments)     Isn't able to recall reaction at this time. Review of Systems   Review of Systems   Constitutional: Positive for appetite change (decreased). Negative for chills, fatigue and fever. + malaise   HENT: Positive for congestion. Negative for rhinorrhea and sore throat. Eyes: Negative for pain, discharge and visual disturbance. Respiratory: Positive for cough. Negative for chest tightness, shortness of breath and wheezing.         - hemoptysis   Cardiovascular: Negative for chest pain, palpitations and leg swelling. Gastrointestinal: Positive for diarrhea, nausea and vomiting. Negative for abdominal pain and constipation. Genitourinary: Negative for dysuria, frequency and hematuria. Musculoskeletal: Negative for arthralgias, back pain and myalgias. Skin: Negative for rash. Neurological: Positive for syncope and light-headedness. Negative for dizziness, weakness, numbness and headaches. - tingling   Psychiatric/Behavioral: Negative. Physical Exam   Physical Exam   Constitutional: She is oriented to person, place, and time. She appears well-developed and well-nourished. No distress. HENT:   Head: Normocephalic and atraumatic. Eyes: EOM are normal. Right eye exhibits no discharge. Left eye exhibits no discharge. No scleral icterus. Neck: Normal range of motion. Neck supple. No tracheal deviation present. Cardiovascular: Regular rhythm, normal heart sounds and intact distal pulses. Bradycardia present. Exam reveals no gallop and no friction rub. No murmur heard.   Pulmonary/Chest: Effort normal and breath sounds normal. No respiratory distress. She has no wheezes. She has no rales. Abdominal: Soft. She exhibits no distension. There is no tenderness. Musculoskeletal: Normal range of motion. She exhibits no edema. Lymphadenopathy:     She has no cervical adenopathy. Neurological: She is alert and oriented to person, place, and time. No focal neuro deficits   Skin: Skin is warm and dry. No rash noted. Psychiatric: She has a normal mood and affect. Nursing note and vitals reviewed. Diagnostic Study Results     Labs -     Recent Results (from the past 12 hour(s))   EKG, 12 LEAD, INITIAL    Collection Time: 01/04/18  8:43 AM   Result Value Ref Range    Ventricular Rate 49 BPM    Atrial Rate 49 BPM    P-R Interval 212 ms    QRS Duration 96 ms    Q-T Interval 498 ms    QTC Calculation (Bezet) 449 ms    Calculated P Axis 61 degrees    Calculated R Axis 1 degrees    Calculated T Axis 26 degrees    Diagnosis       Sinus bradycardia with 1st degree AV block  Left ventricular hypertrophy with repolarization abnormality    When compared with ECG of 22-DEC-2015 21:10,  No significant change was found  Confirmed by Neida Haque ([id number]) on 1/4/2018 9:29:28 AM     CBC WITH AUTOMATED DIFF    Collection Time: 01/04/18  8:52 AM   Result Value Ref Range    WBC 4.1 3.6 - 11.0 K/uL    RBC 4.25 3.80 - 5.20 M/uL    HGB 12.4 11.5 - 16.0 g/dL    HCT 36.9 35.0 - 47.0 %    MCV 86.8 80.0 - 99.0 FL    MCH 29.2 26.0 - 34.0 PG    MCHC 33.6 30.0 - 36.5 g/dL    RDW 13.8 11.5 - 14.5 %    PLATELET 588 233 - 191 K/uL    NEUTROPHILS 57 32 - 75 %    LYMPHOCYTES 33 12 - 49 %    MONOCYTES 10 5 - 13 %    EOSINOPHILS 0 0 - 7 %    BASOPHILS 0 0 - 1 %    ABS. NEUTROPHILS 2.3 1.8 - 8.0 K/UL    ABS. LYMPHOCYTES 1.4 0.8 - 3.5 K/UL    ABS. MONOCYTES 0.4 0.0 - 1.0 K/UL    ABS. EOSINOPHILS 0.0 0.0 - 0.4 K/UL    ABS.  BASOPHILS 0.0 0.0 - 0.1 K/UL   METABOLIC PANEL, COMPREHENSIVE    Collection Time: 01/04/18  8:52 AM   Result Value Ref Range Sodium 134 (L) 136 - 145 mmol/L    Potassium 3.2 (L) 3.5 - 5.1 mmol/L    Chloride 100 97 - 108 mmol/L    CO2 30 21 - 32 mmol/L    Anion gap 4 (L) 5 - 15 mmol/L    Glucose 117 (H) 65 - 100 mg/dL    BUN 12 6 - 20 MG/DL    Creatinine 1.05 (H) 0.55 - 1.02 MG/DL    BUN/Creatinine ratio 11 (L) 12 - 20      GFR est AA >60 >60 ml/min/1.73m2    GFR est non-AA 50 (L) >60 ml/min/1.73m2    Calcium 8.1 (L) 8.5 - 10.1 MG/DL    Bilirubin, total 0.4 0.2 - 1.0 MG/DL    ALT (SGPT) 34 12 - 78 U/L    AST (SGOT) 49 (H) 15 - 37 U/L    Alk. phosphatase 91 45 - 117 U/L    Protein, total 7.8 6.4 - 8.2 g/dL    Albumin 3.2 (L) 3.5 - 5.0 g/dL    Globulin 4.6 (H) 2.0 - 4.0 g/dL    A-G Ratio 0.7 (L) 1.1 - 2.2     CK W/ CKMB & INDEX    Collection Time: 01/04/18  8:52 AM   Result Value Ref Range     26 - 192 U/L    CK - MB <1.0 <3.6 NG/ML    CK-MB Index Cannot be calculated 0 - 2.5     MAGNESIUM    Collection Time: 01/04/18  8:52 AM   Result Value Ref Range    Magnesium 2.3 1.6 - 2.4 mg/dL   TROPONIN I    Collection Time: 01/04/18  8:52 AM   Result Value Ref Range    Troponin-I, Qt. <0.04 <0.05 ng/mL       Radiologic Studies -     CXR Results  (Last 48 hours)               01/04/18 0941  XR CHEST PORT Final result    Impression:  IMPRESSION:   1. No pneumonia       Narrative:  EXAM:  XR CHEST PORT       INDICATION:  productive cough x 1 week, syncope        COMPARISON:  12/22/2015       FINDINGS: A portable AP radiograph of the chest was obtained at 0927 hours. The   patient is on a cardiac monitor. Heart size is normal. Is atherosclerotic change   of the aorta. The lungs are well aerated and clear. No pneumonia. Medical Decision Making   I am the first provider for this patient. I reviewed the vital signs, available nursing notes, past medical history, past surgical history, family history and social history. Vital Signs-Reviewed the patient's vital signs.   Patient Vitals for the past 12 hrs:   Temp Pulse Resp BP SpO2   01/04/18 1000 - (!) 50 15 143/50 96 %   01/04/18 0930 - (!) 52 14 (!) 128/95 97 %   01/04/18 0913 - (!) 51 - 130/57 -   01/04/18 0842 98.2 °F (36.8 °C) (!) 49 16 121/61 96 %       Pulse Oximetry Analysis - 96% on RA    Cardiac Monitor:   Rate: 49 bpm  Rhythm: Sinus Bradycardia     EKG interpretation: (Preliminary)  0843  Rhythm: sinus bradycardia and 1st degree AV block; and regular . Rate (approx.): 49; Axis: normal; NH interval: prolonged; QRS interval: normal ; ST/T wave: normal; Other findings: left ventricular hypertrophy. Written by Chet Bruner, ED Scribe, as dictated by Aida Muhammad MD.    Records Reviewed: Nursing Notes, Old Medical Records and Ambulance Run Sheet    Provider Notes (Medical Decision Making):   Differential includes vasovagal syncope, orthostatic hypotension, dehydration, ACS, arrhythmia, electrolyte abnormality, UTI    Disposition: Labs unremarkable. However, given bradycardia and almost 1.8 second pause on telemetry, will admit for further evaluation. ED Course:   Initial assessment performed. The patients presenting problems have been discussed, and they are in agreement with the care plan formulated and outlined with them. I have encouraged them to ask questions as they arise throughout their visit. CONSULT NOTE:   10:41 AM  Aida Muhammad MD spoke with Jamal Becker MD   Specialty: Hospitalist  Discussed pt's hx, disposition, and available diagnostic and imaging results. Reviewed care plans. Consultant will evaluate pt for admission. Written by Chet Bruner, ED Scribe, as dictated by Aida Muhammad MD.    Disposition:    ADMIT NOTE:  10:44 AM  The patient is being admitted to the hospital by Jamal Becker MD.  The results of their tests and reasons for their admission have been discussed with the patient and/or available family. They convey agreement and understanding for the need to be admitted and for their admission diagnosis. PLAN:  1.  Admit to hospitalist      Diagnosis     Clinical Impression:   1. Syncope and collapse    2. Bradycardia        Attestations: This note is prepared by Remberto Wade, acting as Scribe for Fernando Farias MD.    Fernando Farias MD: The scribe's documentation has been prepared under my direction and personally reviewed by me in its entirety. I confirm that the note above accurately reflects all work, treatment, procedures, and medical decision making performed by me.

## 2018-01-04 NOTE — ED NOTES
TRANSFER - OUT REPORT:    Verbal report given to amy(name) on Lupis Yuan  being transferred to 214(unit) for routine progression of care       Report consisted of patients Situation, Background, Assessment and   Recommendations(SBAR). Information from the following report(s) SBAR, Kardex, ED Summary, [facility] and Recent Results was reviewed with the receiving nurse. Lines:   Peripheral IV 01/04/18 Left Antecubital (Active)   Site Assessment Clean, dry, & intact 1/4/2018  9:35 AM   Phlebitis Assessment 0 1/4/2018  9:35 AM   Infiltration Assessment 0 1/4/2018  9:35 AM   Dressing Status Clean, dry, & intact 1/4/2018  9:35 AM   Hub Color/Line Status Pink 1/4/2018  9:35 AM       Peripheral IV 01/04/18 Left Hand (Active)   Site Assessment Clean, dry, & intact 1/4/2018  1:19 PM   Phlebitis Assessment 0 1/4/2018  1:19 PM   Infiltration Assessment 0 1/4/2018  1:19 PM   Dressing Status Clean, dry, & intact 1/4/2018  1:19 PM   Hub Color/Line Status Blue 1/4/2018  1:19 PM        Opportunity for questions and clarification was provided.       Patient transported with:   Herb Solitario RN

## 2018-01-05 VITALS
RESPIRATION RATE: 16 BRPM | OXYGEN SATURATION: 96 % | BODY MASS INDEX: 30.65 KG/M2 | DIASTOLIC BLOOD PRESSURE: 71 MMHG | WEIGHT: 178.57 LBS | TEMPERATURE: 98.2 F | SYSTOLIC BLOOD PRESSURE: 175 MMHG | HEART RATE: 73 BPM

## 2018-01-05 PROBLEM — R00.1 SINUS BRADYCARDIA: Status: ACTIVE | Noted: 2018-01-05

## 2018-01-05 PROBLEM — R55 NEAR SYNCOPE: Status: ACTIVE | Noted: 2018-01-05

## 2018-01-05 LAB
ANION GAP SERPL CALC-SCNC: 4 MMOL/L (ref 5–15)
BASOPHILS # BLD: 0 K/UL
BASOPHILS NFR BLD: 0 %
BUN SERPL-MCNC: 8 MG/DL (ref 6–20)
BUN/CREAT SERPL: 11 (ref 12–20)
CALCIUM SERPL-MCNC: 8 MG/DL (ref 8.5–10.1)
CHLORIDE SERPL-SCNC: 102 MMOL/L (ref 97–108)
CO2 SERPL-SCNC: 30 MMOL/L (ref 21–32)
CREAT SERPL-MCNC: 0.74 MG/DL (ref 0.55–1.02)
DIFFERENTIAL METHOD BLD: ABNORMAL
EOSINOPHIL # BLD: 0 K/UL
EOSINOPHIL NFR BLD: 0 %
ERYTHROCYTE [DISTWIDTH] IN BLOOD BY AUTOMATED COUNT: 13.7 % (ref 11.5–14.5)
GLUCOSE SERPL-MCNC: 100 MG/DL (ref 65–100)
HCT VFR BLD AUTO: 38.9 % (ref 35–47)
HGB BLD-MCNC: 13.1 G/DL (ref 11.5–16)
LYMPHOCYTES # BLD: 2.2 K/UL
LYMPHOCYTES NFR BLD: 66 %
MAGNESIUM SERPL-MCNC: 2.1 MG/DL (ref 1.6–2.4)
MCH RBC QN AUTO: 29.3 PG (ref 26–34)
MCHC RBC AUTO-ENTMCNC: 33.7 G/DL (ref 30–36.5)
MCV RBC AUTO: 87 FL (ref 80–99)
MONOCYTES # BLD: 0.3 K/UL
MONOCYTES NFR BLD: 9 %
NEUTS SEG # BLD: 0.8 K/UL
NEUTS SEG NFR BLD: 25 %
PHOSPHATE SERPL-MCNC: 2.6 MG/DL (ref 2.6–4.7)
PLATELET # BLD AUTO: 144 K/UL (ref 150–400)
POTASSIUM SERPL-SCNC: 3.3 MMOL/L (ref 3.5–5.1)
RBC # BLD AUTO: 4.47 M/UL (ref 3.8–5.2)
RBC MORPH BLD: ABNORMAL
SODIUM SERPL-SCNC: 136 MMOL/L (ref 136–145)
TSH SERPL DL<=0.05 MIU/L-ACNC: 1.05 UIU/ML (ref 0.36–3.74)
WBC # BLD AUTO: 3.3 K/UL (ref 3.6–11)

## 2018-01-05 PROCEDURE — 85025 COMPLETE CBC W/AUTO DIFF WBC: CPT | Performed by: HOSPITALIST

## 2018-01-05 PROCEDURE — 83735 ASSAY OF MAGNESIUM: CPT | Performed by: HOSPITALIST

## 2018-01-05 PROCEDURE — 80048 BASIC METABOLIC PNL TOTAL CA: CPT | Performed by: HOSPITALIST

## 2018-01-05 PROCEDURE — 36415 COLL VENOUS BLD VENIPUNCTURE: CPT | Performed by: HOSPITALIST

## 2018-01-05 PROCEDURE — 84100 ASSAY OF PHOSPHORUS: CPT | Performed by: HOSPITALIST

## 2018-01-05 PROCEDURE — 74011250637 HC RX REV CODE- 250/637: Performed by: HOSPITALIST

## 2018-01-05 PROCEDURE — 93306 TTE W/DOPPLER COMPLETE: CPT

## 2018-01-05 PROCEDURE — 99218 HC RM OBSERVATION: CPT

## 2018-01-05 PROCEDURE — 84443 ASSAY THYROID STIM HORMONE: CPT | Performed by: HOSPITALIST

## 2018-01-05 RX ORDER — POTASSIUM CHLORIDE 1500 MG/1
20 TABLET, FILM COATED, EXTENDED RELEASE ORAL DAILY
Qty: 5 TAB | Refills: 0 | Status: SHIPPED | OUTPATIENT
Start: 2018-01-05 | End: 2022-01-31

## 2018-01-05 RX ORDER — VALSARTAN 40 MG/1
40 TABLET ORAL DAILY
Qty: 30 TAB | Refills: 0 | Status: SHIPPED | OUTPATIENT
Start: 2018-01-06 | End: 2021-12-08

## 2018-01-05 RX ADMIN — NYSTATIN: 100000 POWDER TOPICAL at 11:00

## 2018-01-05 RX ADMIN — VALSARTAN 40 MG: 40 TABLET ORAL at 10:20

## 2018-01-05 RX ADMIN — ASPIRIN 81 MG: 81 TABLET, COATED ORAL at 10:20

## 2018-01-05 RX ADMIN — CEFUROXIME AXETIL 250 MG: 250 TABLET ORAL at 10:20

## 2018-01-05 RX ADMIN — Medication 10 ML: at 06:44

## 2018-01-05 NOTE — PROGRESS NOTES
Spiritual Care Partner Volunteer visited patient on 1/5/18. Documented by:    Krystin Lou M.S.   Spiritual Care Department  If needs arise please call Sofy-MICHELLE ([phone])

## 2018-01-05 NOTE — DISCHARGE SUMMARY
Discharge Summary    Patient: Nicholas Gill               Sex: female          DOA: 1/4/2018         YOB: 1933      Age:  80 y.o.        LOS:  LOS: 0 days                Admit Date: 1/4/2018    Discharge Date: 1/5/2018    Admission Diagnoses: Syncope    Discharge Diagnoses:    Problem List as of 1/5/2018  Date Reviewed: 1/5/2018          Codes Class Noted - Resolved    Sinus bradycardia ICD-10-CM: R00.1  ICD-9-CM: 427.89  1/5/2018 - Present        * (Principal)Near syncope ICD-10-CM: R55  ICD-9-CM: 780.2  1/5/2018 - Present        Essential hypertension ICD-10-CM: I10  ICD-9-CM: 401.9  1/4/2018 - Present    Hypokalemia          Discharge Medications:     Current Discharge Medication List      START taking these medications    Details   valsartan (DIOVAN) 40 mg tablet    K-dur 20 meq daily x 5 days Take 1 Tab by mouth daily. Qty: 30 Tab, Refills: 0         CONTINUE these medications which have NOT CHANGED    Details   pravastatin (PRAVACHOL) 40 mg tablet Take 40 mg by mouth nightly. furosemide (LASIX) 40 mg tablet Take 40 mg by mouth daily as needed for Other (swelling). celecoxib (CELEBREX) 200 mg capsule Take 200 mg by mouth daily as needed for Pain. cefUROXime (CEFTIN) 250 mg tablet Take 250 mg by mouth two (2) times a day. aspirin delayed-release 81 mg tablet Take 81 mg by mouth daily. STOP taking these medications       atenolol (TENORMIN) 25 mg tablet Comments:   Reason for Stopping:                Follow-up:pcp    Discharge Condition:good    Activity:as tolerated    Diet:healthy      Labs:  Labs: Results:       Chemistry Recent Labs      01/05/18   0656  01/04/18   0852   GLU  100  117*   NA  136  134*   K  3.3*  3.2*   CL  102  100   CO2  30  30   BUN  8  12   CREA  0.74  1.05*   CA  8.0*  8.1*   AGAP  4*  4*   BUCR  11*  11*   AP   --   91   TP   --   7.8   ALB   --   3.2*   GLOB   --   4.6*   AGRAT   --   0.7*      CBC w/Diff Recent Labs      01/05/18   0189 01/04/18   0852   WBC  3.3*  4.1   RBC  4.47  4.25   HGB  13.1  12.4   HCT  38.9  36.9   PLT  144*  156   GRANS  25  57   LYMPH  66  33   EOS  0  0      Cardiac Enzymes Recent Labs      01/04/18   1603  01/04/18   0852   CPK  143  152      Coagulation No results for input(s): PTP, INR, APTT in the last 72 hours. No lab exists for component: INREXT    Lipid Panel No results found for: CHOL, CHOLPOCT, CHOLX, CHLST, CHOLV, 609952, HDL, LDL, LDLC, DLDLP, 110421, VLDLC, VLDL, TGLX, TRIGL, TRIGP, TGLPOCT, CHHD, CHHDX   BNP No results for input(s): BNPP in the last 72 hours. Liver Enzymes Recent Labs      01/04/18   0852   TP  7.8   ALB  3.2*   AP  91   SGOT  49*      Thyroid Studies Lab Results   Component Value Date/Time    TSH 1.05 01/05/2018 06:56 AM          Imaging:  CXR on 1/4:  1. No pneumonia         Consults:   cardiology    Treatment Team: Treatment Team: Attending Provider: Estefany Gonzalez MD; Hospitalist: Margo Johnson MD; Utilization Review: Iza Painter RN; Consulting Provider: Trino Quiroga MD    Significant Diagnostic Studies:see recent lab works    ECHO on 1/5:  SUMMARY:  Left ventricle: Ejection fraction was estimated in the range of 55 % to 60  %. There were no regional wall motion abnormalities. There was mild  concentric hypertrophy. Left atrium: The atrium was mildly dilated. Mitral valve: There was mild annular calcification. There was mild  Regurgitation. Hospital Course:  Near syncope, suspect vasovagal   Bradycardia HR in 40s initially, has improved to upper 50s and 60s since without intervention, on atenolol chronically (dose was decreased from 50 to 25mg over a year ago)  Hypokalemia, 3.2 on admission  Intertrigo breast folds. Recent shingles under left breast  Recent sinus infection, on cefuroxime  Chronic intermittent diarrhea  HTN  Hyperlipidemia  Bilateral knee arthritis with chronic b/l lower leg edema x 1 year.   Currently minimal edema     --observation, monitor on tele. Not orthostatic. --Stop atenolol (already took dose today). Replace with losartan  --echo c/w Ejection fraction was estimated in the range of 55 % to 60  %. There were no regional wall motion abnormalities. There was mild  concentric hypertrophy.  --kdur 40meq  --continue cefuroxime  --continue pravastatin, aspirin, celecoxib prn.  --nystatin  -Cardiology consulted,agreed to change atenolol to losartan and agreed to d/c patient after echo did not show an abnormality     Body mass index is 30.65 kg/(m^2).     P/E  NAD,sitting in bed,denying sob,chest pain,dizziness  Lungs ctab  Heart s1s2 nl  Extr:no edema  Neuro:aaox3    Time for discharge:35minutes    Rosemary Marroquin MD  January 5, 2018

## 2018-01-05 NOTE — PROGRESS NOTES
Pt aware of OBSERVATION/MOON letter (signed) placed in chart. Pt aware that his nurse will review d/c plans.   Pt will have transportation and family support at d/c.         KATE Yu CM  [phone]

## 2018-01-05 NOTE — PROGRESS NOTES
1/5/2018 12:44 PM    Admit Date: 1/4/2018    Admit Diagnosis: Syncope    Subjective:     Alexis Hguo denies chest pain, chest pressure/discomfort, dyspnea, palpitations, irregular heart beats, near-syncope, syncope. Visit Vitals    /71 (BP 1 Location: Right arm, BP Patient Position: At rest)    Pulse 73    Temp 98.2 °F (36.8 °C)    Resp 16    Wt 178 lb 9.2 oz (81 kg)    SpO2 96%    BMI 30.65 kg/m2     Current Facility-Administered Medications   Medication Dose Route Frequency    artificial tears (dextran 70-hypromellose) (NATURAL BALANCE) 0.1-0.3 % ophthalmic solution 1 Drop  1 Drop Both Eyes TID    sodium chloride (NS) flush 5-10 mL  5-10 mL IntraVENous Q8H    sodium chloride (NS) flush 5-10 mL  5-10 mL IntraVENous PRN    acetaminophen (TYLENOL) tablet 650 mg  650 mg Oral Q4H PRN    ondansetron (ZOFRAN) injection 4 mg  4 mg IntraVENous Q6H PRN    nystatin (MYCOSTATIN) 100,000 unit/gram powder   Topical BID    aspirin delayed-release tablet 81 mg  81 mg Oral DAILY    celecoxib (CELEBREX) capsule 200 mg  200 mg Oral DAILY PRN    pravastatin (PRAVACHOL) tablet 40 mg  40 mg Oral QHS    cefUROXime (CEFTIN) tablet 250 mg  250 mg Oral BID    valsartan (DIOVAN) tablet 40 mg  40 mg Oral DAILY    benzocaine-menthol (CHLORASEPTIC MAX) lozenge 1 Lozenge  1 Lozenge Oral Q2H PRN         Objective:      Visit Vitals    /71 (BP 1 Location: Right arm, BP Patient Position: At rest)    Pulse 73    Temp 98.2 °F (36.8 °C)    Resp 16    Wt 178 lb 9.2 oz (81 kg)    SpO2 96%    BMI 30.65 kg/m2       Physical Exam:  Abdomen: soft, non-tender.  Bowel sounds normal.   Extremities: no cyanosis or edema  Heart: regular rate and rhythm, S1, S2 normal, no murmur, click, rub or gallop  Lungs: clear to auscultation bilaterally  Neurologic: Grossly normal    Data Review:   Labs:    Recent Results (from the past 24 hour(s))   URINALYSIS W/ REFLEX CULTURE    Collection Time: 01/04/18  1:11 PM   Result Value Ref Range    Color YELLOW/STRAW      Appearance CLEAR CLEAR      Specific gravity 1.013 1.003 - 1.030      pH (UA) 6.0 5.0 - 8.0      Protein NEGATIVE  NEG mg/dL    Glucose NEGATIVE  NEG mg/dL    Ketone NEGATIVE  NEG mg/dL    Bilirubin NEGATIVE  NEG      Blood TRACE (A) NEG      Urobilinogen 0.2 0.2 - 1.0 EU/dL    Nitrites NEGATIVE  NEG      Leukocyte Esterase NEGATIVE  NEG      WBC 0-4 0 - 4 /hpf    RBC 10-20 0 - 5 /hpf    Epithelial cells FEW FEW /lpf    Bacteria NEGATIVE  NEG /hpf    UA:UC IF INDICATED CULTURE NOT INDICATED BY UA RESULT CNI      Hyaline cast 0-2 0 - 5 /lpf   CK W/ CKMB & INDEX    Collection Time: 01/04/18  4:03 PM   Result Value Ref Range     26 - 192 U/L    CK - MB <1.0 <3.6 NG/ML    CK-MB Index Cannot be calculated 0 - 2.5     TROPONIN I    Collection Time: 01/04/18  4:03 PM   Result Value Ref Range    Troponin-I, Qt. <0.04 <0.05 ng/mL   MAGNESIUM    Collection Time: 01/05/18  6:56 AM   Result Value Ref Range    Magnesium 2.1 1.6 - 2.4 mg/dL   METABOLIC PANEL, BASIC    Collection Time: 01/05/18  6:56 AM   Result Value Ref Range    Sodium 136 136 - 145 mmol/L    Potassium 3.3 (L) 3.5 - 5.1 mmol/L    Chloride 102 97 - 108 mmol/L    CO2 30 21 - 32 mmol/L    Anion gap 4 (L) 5 - 15 mmol/L    Glucose 100 65 - 100 mg/dL    BUN 8 6 - 20 MG/DL    Creatinine 0.74 0.55 - 1.02 MG/DL    BUN/Creatinine ratio 11 (L) 12 - 20      GFR est AA >60 >60 ml/min/1.73m2    GFR est non-AA >60 >60 ml/min/1.73m2    Calcium 8.0 (L) 8.5 - 10.1 MG/DL   CBC WITH AUTOMATED DIFF    Collection Time: 01/05/18  6:56 AM   Result Value Ref Range    WBC 3.3 (L) 3.6 - 11.0 K/uL    RBC 4.47 3.80 - 5.20 M/uL    HGB 13.1 11.5 - 16.0 g/dL    HCT 38.9 35.0 - 47.0 %    MCV 87.0 80.0 - 99.0 FL    MCH 29.3 26.0 - 34.0 PG    MCHC 33.7 30.0 - 36.5 g/dL    RDW 13.7 11.5 - 14.5 %    PLATELET 292 (L) 089 - 400 K/uL    NEUTROPHILS 25 %    LYMPHOCYTES 66 %    MONOCYTES 9 %    EOSINOPHILS 0 %    BASOPHILS 0 %    ABS. NEUTROPHILS 0.8 K/UL    ABS. LYMPHOCYTES 2.2 K/UL    ABS. MONOCYTES 0.3 K/UL    ABS. EOSINOPHILS 0.0 K/UL    ABS. BASOPHILS 0.0 K/UL    RBC COMMENTS NORMOCYTIC, NORMOCHROMIC      DF MANUAL     PHOSPHORUS    Collection Time: 01/05/18  6:56 AM   Result Value Ref Range    Phosphorus 2.6 2.6 - 4.7 MG/DL   TSH 3RD GENERATION    Collection Time: 01/05/18  6:56 AM   Result Value Ref Range    TSH 1.05 0.36 - 3.74 uIU/mL       Telemetry: normal sinus rhythm      Assessment:     Principal Problem:    Near syncope (1/5/2018)    Active Problems:    Essential hypertension (1/4/2018)      Sinus bradycardia (1/5/2018)        Plan:     No cardiac events. Normal LVEF. No further cardiac work up. Vasovagal syncope. Ok to dc from cardiac standpoint.

## 2018-01-05 NOTE — PROGRESS NOTES
General Surgery End of Shift Nursing Note    Bedside shift change report given to Fantasma Bocanegra (oncoming nurse) by Allie Barrett (offgoing nurse). Report included the following information SBAR, Kardex, ED Summary, Intake/Output, MAR, Recent Results and Cardiac Rhythm NSR. Shift worked:   [id number]   Summary of shift:       Issues for physician to address:        Number times ambulated in hallway past shift: 0    Number of times OOB to chair past shift: 0    Pain Management:  Current medication: n/a  Patient states pain is manageable on current pain medication:    GI:    Current diet:  DIET CARDIAC Regular    Tolerating current diet: YES  Passing flatus: YES  Last Bowel Movement: yesterday   Appearance: wnl    Respiratory:    Incentive Spirometer at bedside: NO  Patient instructed on use: NO    Patient Safety:    Falls Score: 2  Bed Alarm On? No  Sitter?  Not applicable    Mray Pan

## 2018-01-08 ENCOUNTER — HOSPITAL ENCOUNTER (EMERGENCY)
Age: 83
Discharge: HOME | End: 2018-01-08
Attending: EMERGENCY MEDICINE
Payer: MEDICARE

## 2018-01-08 ENCOUNTER — APPOINTMENT (OUTPATIENT)
Dept: GENERAL RADIOLOGY | Age: 83
End: 2018-01-08
Attending: EMERGENCY MEDICINE
Payer: MEDICARE

## 2018-01-08 VITALS
DIASTOLIC BLOOD PRESSURE: 61 MMHG | WEIGHT: 174.16 LBS | RESPIRATION RATE: 20 BRPM | SYSTOLIC BLOOD PRESSURE: 139 MMHG | BODY MASS INDEX: 29.9 KG/M2 | OXYGEN SATURATION: 99 % | TEMPERATURE: 98.4 F | HEART RATE: 84 BPM

## 2018-01-08 DIAGNOSIS — J20.9 ACUTE BRONCHITIS, UNSPECIFIED ORGANISM: Primary | ICD-10-CM

## 2018-01-08 LAB
ALBUMIN SERPL-MCNC: 3.3 G/DL (ref 3.5–5)
ALBUMIN/GLOB SERPL: 0.7 {RATIO} (ref 1.1–2.2)
ALP SERPL-CCNC: 83 U/L (ref 45–117)
ALT SERPL-CCNC: 37 U/L (ref 12–78)
ANION GAP SERPL CALC-SCNC: 6 MMOL/L (ref 5–15)
AST SERPL-CCNC: 35 U/L (ref 15–37)
ATRIAL RATE: 90 BPM
BASOPHILS # BLD: 0 K/UL (ref 0–0.1)
BASOPHILS NFR BLD: 0 % (ref 0–1)
BILIRUB SERPL-MCNC: 0.5 MG/DL (ref 0.2–1)
BUN SERPL-MCNC: 10 MG/DL (ref 6–20)
BUN/CREAT SERPL: 14 (ref 12–20)
CALCIUM SERPL-MCNC: 9.1 MG/DL (ref 8.5–10.1)
CALCULATED P AXIS, ECG09: 46 DEGREES
CALCULATED R AXIS, ECG10: 21 DEGREES
CALCULATED T AXIS, ECG11: 29 DEGREES
CHLORIDE SERPL-SCNC: 97 MMOL/L (ref 97–108)
CK SERPL-CCNC: 61 U/L (ref 26–192)
CO2 SERPL-SCNC: 31 MMOL/L (ref 21–32)
CREAT SERPL-MCNC: 0.7 MG/DL (ref 0.55–1.02)
DIAGNOSIS, 93000: NORMAL
EOSINOPHIL # BLD: 0 K/UL (ref 0–0.4)
EOSINOPHIL NFR BLD: 0 % (ref 0–7)
ERYTHROCYTE [DISTWIDTH] IN BLOOD BY AUTOMATED COUNT: 13.3 % (ref 11.5–14.5)
FLUAV AG NPH QL IA: NEGATIVE
FLUBV AG NOSE QL IA: NEGATIVE
GLOBULIN SER CALC-MCNC: 4.9 G/DL (ref 2–4)
GLUCOSE SERPL-MCNC: 132 MG/DL (ref 65–100)
HCT VFR BLD AUTO: 38.5 % (ref 35–47)
HGB BLD-MCNC: 13.4 G/DL (ref 11.5–16)
LYMPHOCYTES # BLD: 2 K/UL (ref 0.8–3.5)
LYMPHOCYTES NFR BLD: 34 % (ref 12–49)
MCH RBC QN AUTO: 30.7 PG (ref 26–34)
MCHC RBC AUTO-ENTMCNC: 34.8 G/DL (ref 30–36.5)
MCV RBC AUTO: 88.1 FL (ref 80–99)
MONOCYTES # BLD: 0.4 K/UL (ref 0–1)
MONOCYTES NFR BLD: 8 % (ref 5–13)
NEUTS SEG # BLD: 3.4 K/UL (ref 1.8–8)
NEUTS SEG NFR BLD: 58 % (ref 32–75)
P-R INTERVAL, ECG05: 180 MS
PLATELET # BLD AUTO: 145 K/UL (ref 150–400)
POTASSIUM SERPL-SCNC: 3.3 MMOL/L (ref 3.5–5.1)
PROT SERPL-MCNC: 8.2 G/DL (ref 6.4–8.2)
Q-T INTERVAL, ECG07: 372 MS
QRS DURATION, ECG06: 90 MS
QTC CALCULATION (BEZET), ECG08: 455 MS
RBC # BLD AUTO: 4.37 M/UL (ref 3.8–5.2)
SODIUM SERPL-SCNC: 134 MMOL/L (ref 136–145)
TROPONIN I SERPL-MCNC: <0.04 NG/ML
VENTRICULAR RATE, ECG03: 90 BPM
WBC # BLD AUTO: 5.9 K/UL (ref 3.6–11)

## 2018-01-08 PROCEDURE — 94640 AIRWAY INHALATION TREATMENT: CPT

## 2018-01-08 PROCEDURE — 84484 ASSAY OF TROPONIN QUANT: CPT | Performed by: EMERGENCY MEDICINE

## 2018-01-08 PROCEDURE — 80053 COMPREHEN METABOLIC PANEL: CPT | Performed by: EMERGENCY MEDICINE

## 2018-01-08 PROCEDURE — 77030029684 HC NEB SM VOL KT MONA -A

## 2018-01-08 PROCEDURE — 93005 ELECTROCARDIOGRAM TRACING: CPT

## 2018-01-08 PROCEDURE — 99285 EMERGENCY DEPT VISIT HI MDM: CPT

## 2018-01-08 PROCEDURE — 85025 COMPLETE CBC W/AUTO DIFF WBC: CPT | Performed by: EMERGENCY MEDICINE

## 2018-01-08 PROCEDURE — 74011000250 HC RX REV CODE- 250: Performed by: EMERGENCY MEDICINE

## 2018-01-08 PROCEDURE — 36415 COLL VENOUS BLD VENIPUNCTURE: CPT | Performed by: EMERGENCY MEDICINE

## 2018-01-08 PROCEDURE — 71046 X-RAY EXAM CHEST 2 VIEWS: CPT

## 2018-01-08 PROCEDURE — 87804 INFLUENZA ASSAY W/OPTIC: CPT | Performed by: EMERGENCY MEDICINE

## 2018-01-08 PROCEDURE — 82550 ASSAY OF CK (CPK): CPT | Performed by: EMERGENCY MEDICINE

## 2018-01-08 RX ORDER — ALBUTEROL SULFATE 90 UG/1
2 AEROSOL, METERED RESPIRATORY (INHALATION)
Qty: 1 INHALER | Refills: 1 | Status: SHIPPED | OUTPATIENT
Start: 2018-01-08 | End: 2021-12-08

## 2018-01-08 RX ORDER — POTASSIUM CHLORIDE 20 MEQ/1
40 TABLET, EXTENDED RELEASE ORAL
Status: DISCONTINUED | OUTPATIENT
Start: 2018-01-08 | End: 2018-01-08 | Stop reason: HOSPADM

## 2018-01-08 RX ORDER — IPRATROPIUM BROMIDE AND ALBUTEROL SULFATE 2.5; .5 MG/3ML; MG/3ML
3 SOLUTION RESPIRATORY (INHALATION)
Status: COMPLETED | OUTPATIENT
Start: 2018-01-08 | End: 2018-01-08

## 2018-01-08 RX ORDER — HYDROCODONE POLISTIREX AND CHLORPHENIRAMINE POLISTIREX 10; 8 MG/5ML; MG/5ML
5 SUSPENSION, EXTENDED RELEASE ORAL
Qty: 60 ML | Refills: 0 | Status: SHIPPED | OUTPATIENT
Start: 2018-01-08 | End: 2021-12-08

## 2018-01-08 RX ADMIN — IPRATROPIUM BROMIDE AND ALBUTEROL SULFATE 3 ML: .5; 3 SOLUTION RESPIRATORY (INHALATION) at 15:33

## 2018-01-08 NOTE — ED NOTES
Bedside shift change report given to [address] [place] [address] [id number] (oncoming nurse) by Dahlia Posada RN (offgoing nurse). Report included the following information SBAR, ED Summary, Intake/Output and Recent Results. Assumed care of patient who is lying quietly on the stretcher in no apparent distress. Pt is alert and oriented x 4. Respirations are even and unlabored. No needs are expressed at this time.

## 2018-01-08 NOTE — ED PROVIDER NOTES
EMERGENCY DEPARTMENT HISTORY AND PHYSICAL EXAM      Date: 1/8/2018  Patient Name: Donna Rae Kwabena    History of Presenting Illness     Chief Complaint   Patient presents with    Cough     with congestion, sore throat, and general maliase x 12/27. Discharged on 1/4 for syncope       History Provided By: Patient and Patient's Daughter    HPI: Naun Marin, 80 y.o. female with PMHx significant for HTN, arthritis and PShx for hysterectomy s/p uterine cancer, presents ambulatory with her daughter to the ED with cc of gradual onset, progressively worsening SOB with a productive cough, congestion, and decreased appetite that began a few weeks ago. Pt notes mucus in her nose and throat. She wakes up multiple times during the night to cough up bloody mucus. She is feeling mildly better than when her sxs onset. She notes sick contact, including grandchildren who are flu positive. She is only recently able to tolerate 7 Up and chicken soup. She is allergic to Codeine. Pt notes that she is from [place] [address]. She was seen on 1/4/2018 for a syncope. She denies a history of asthma or COPD. Pt specifically denies chest pain, fevers, or sinus pain. PCP: PROVIDER UNKNOWN     Social Hx: - Tobacco, - EtOH, - Illicit Drugs    There are no other complaints, changes, or physical findings at this time. Current Facility-Administered Medications   Medication Dose Route Frequency Provider Last Rate Last Dose    potassium chloride (K-DUR, KLOR-CON) SR tablet 40 mEq  40 mEq Oral NOW Ellen Jacobson MD         Current Outpatient Prescriptions   Medication Sig Dispense Refill    albuterol (PROVENTIL HFA, VENTOLIN HFA, PROAIR HFA) 90 mcg/actuation inhaler Take 2 Puffs by inhalation every four (4) hours as needed for Wheezing. 1 Inhaler 1    chlorpheniramine-HYDROcodone (TUSSIONEX PENNKINETIC ER) 10-8 mg/5 mL suspension Take 5 mL by mouth every twelve (12) hours as needed for Cough. Max Daily Amount: 10 mL.  60 mL 0    valsartan (DIOVAN) 40 mg tablet Take 1 Tab by mouth daily. 30 Tab 0    potassium chloride SR (K-TAB) 20 mEq tablet Take 1 Tab by mouth daily. 5 Tab 0    pravastatin (PRAVACHOL) 40 mg tablet Take 40 mg by mouth nightly.  furosemide (LASIX) 40 mg tablet Take 40 mg by mouth daily as needed for Other (swelling).  celecoxib (CELEBREX) 200 mg capsule Take 200 mg by mouth daily as needed for Pain.  cefUROXime (CEFTIN) 250 mg tablet Take 250 mg by mouth two (2) times a day.  aspirin delayed-release 81 mg tablet Take 81 mg by mouth daily. Past History     Past Medical History:  Past Medical History:   Diagnosis Date    Hyperlipidemia     Hypertension     Uterine cancer ([facility] [address] 1998     Past Surgical History:  Past Surgical History:   Procedure Laterality Date    HX HYSTERECTOMY       Family History:  Family History   Problem Relation Age of Onset    Tuberculosis Mother     Tuberculosis Father      Social History:  Social History   Substance Use Topics    Smoking status: Never Smoker    Smokeless tobacco: Never Used    Alcohol use No     Allergies: Allergies   Allergen Reactions    Seafood Hives     shellfish    Sulfa (Sulfonamide Antibiotics) Other (comments)     Isn't able to recall reaction at this time. Review of Systems   Review of Systems   Constitutional: Positive for appetite change (decreased). Negative for chills and fever. HENT: Positive for congestion. Negative for sinus pain. Respiratory: Positive for cough and shortness of breath. Cardiovascular: Negative for chest pain. Gastrointestinal: Negative for constipation, diarrhea, nausea and vomiting. Neurological: Negative for weakness and numbness. All other systems reviewed and are negative. Physical Exam   Physical Exam   Constitutional: She is oriented to person, place, and time. She appears well-developed and well-nourished. HENT:   Head: Normocephalic and atraumatic.    Eyes: Conjunctivae and EOM are normal.   Neck: Normal range of motion. Neck supple. Cardiovascular: Normal rate and regular rhythm. Pulmonary/Chest: Effort normal. No tachypnea. No respiratory distress. She has wheezes. Wet cough  Mild wheezes   Sounds congested    Abdominal: Soft. She exhibits no distension. There is no tenderness. Musculoskeletal: Normal range of motion. Neurological: She is alert and oriented to person, place, and time. Skin: Skin is warm and dry. Psychiatric: She has a normal mood and affect. Nursing note and vitals reviewed. Diagnostic Study Results     Labs -     Recent Results (from the past 12 hour(s))   EKG, 12 LEAD, INITIAL    Collection Time: 01/08/18 12:47 PM   Result Value Ref Range    Ventricular Rate 90 BPM    Atrial Rate 90 BPM    P-R Interval 180 ms    QRS Duration 90 ms    Q-T Interval 372 ms    QTC Calculation (Bezet) 455 ms    Calculated P Axis 46 degrees    Calculated R Axis 21 degrees    Calculated T Axis 29 degrees    Diagnosis       Normal sinus rhythm  Voltage criteria for left ventricular hypertrophy  When compared with ECG of 04-JAN-2018 08:43,  PA interval has decreased  Vent. rate has increased BY  41 BPM     METABOLIC PANEL, COMPREHENSIVE    Collection Time: 01/08/18  2:03 PM   Result Value Ref Range    Sodium 134 (L) 136 - 145 mmol/L    Potassium 3.3 (L) 3.5 - 5.1 mmol/L    Chloride 97 97 - 108 mmol/L    CO2 31 21 - 32 mmol/L    Anion gap 6 5 - 15 mmol/L    Glucose 132 (H) 65 - 100 mg/dL    BUN 10 6 - 20 MG/DL    Creatinine 0.70 0.55 - 1.02 MG/DL    BUN/Creatinine ratio 14 12 - 20      GFR est AA >60 >60 ml/min/1.73m2    GFR est non-AA >60 >60 ml/min/1.73m2    Calcium 9.1 8.5 - 10.1 MG/DL    Bilirubin, total 0.5 0.2 - 1.0 MG/DL    ALT (SGPT) 37 12 - 78 U/L    AST (SGOT) 35 15 - 37 U/L    Alk.  phosphatase 83 45 - 117 U/L    Protein, total 8.2 6.4 - 8.2 g/dL    Albumin 3.3 (L) 3.5 - 5.0 g/dL    Globulin 4.9 (H) 2.0 - 4.0 g/dL    A-G Ratio 0.7 (L) 1.1 - 2.2     CBC WITH AUTOMATED DIFF    Collection Time: 01/08/18  2:03 PM   Result Value Ref Range    WBC 5.9 3.6 - 11.0 K/uL    RBC 4.37 3.80 - 5.20 M/uL    HGB 13.4 11.5 - 16.0 g/dL    HCT 38.5 35.0 - 47.0 %    MCV 88.1 80.0 - 99.0 FL    MCH 30.7 26.0 - 34.0 PG    MCHC 34.8 30.0 - 36.5 g/dL    RDW 13.3 11.5 - 14.5 %    PLATELET 516 (L) 467 - 400 K/uL    NEUTROPHILS 58 32 - 75 %    LYMPHOCYTES 34 12 - 49 %    MONOCYTES 8 5 - 13 %    EOSINOPHILS 0 0 - 7 %    BASOPHILS 0 0 - 1 %    ABS. NEUTROPHILS 3.4 1.8 - 8.0 K/UL    ABS. LYMPHOCYTES 2.0 0.8 - 3.5 K/UL    ABS. MONOCYTES 0.4 0.0 - 1.0 K/UL    ABS. EOSINOPHILS 0.0 0.0 - 0.4 K/UL    ABS. BASOPHILS 0.0 0.0 - 0.1 K/UL   TROPONIN I    Collection Time: 01/08/18  2:03 PM   Result Value Ref Range    Troponin-I, Qt. <0.04 <0.05 ng/mL   CK W/ REFLX CKMB    Collection Time: 01/08/18  2:03 PM   Result Value Ref Range    CK 61 26 - 192 U/L   INFLUENZA A & B AG (RAPID TEST)    Collection Time: 01/08/18  2:46 PM   Result Value Ref Range    Influenza A Antigen NEGATIVE  NEG      Influenza B Antigen NEGATIVE  NEG       Radiologic Studies -     CXR Results  (Last 48 hours)               01/08/18 1438  XR CHEST PA LAT Final result    Impression:  IMPRESSION: No acute cardiopulmonary disease. Narrative: Indication: Shortness of breath. Cough with congestion, sore throat, generalized   malaise. Exam: AP semiupright and lateral views of the chest.       Direct comparison is made to prior CXR dated        Findings: Cardiomediastinal silhouette is within normal limits. Lungs are clear   bilaterally. Pleural spaces are normal. Osseous structures are intact. Medical Decision Making   I am the first provider for this patient. I reviewed the vital signs, available nursing notes, past medical history, past surgical history, family history and social history. Vital Signs-Reviewed the patient's vital signs.   Patient Vitals for the past 12 hrs:   Temp Pulse Resp BP SpO2   01/08/18 1600 - 84 20 139/61 99 % 01/08/18 1530 - 85 21 141/63 98 %   01/08/18 1500 - 87 18 134/64 99 %   01/08/18 1243 98.4 °F (36.9 °C) 95 20 156/81 97 %     Pulse Oximetry Analysis - 97% on RA    EKG interpretation: 12:47  Rhythm: normal sinus rhythm; and regular . Rate (approx.): 90; Axis: normal; DC interval: normal; QRS interval: normal ; ST/T wave: normal.  Written by Kourtney Bustamante ED Scribe, as dictated by Jose Bacon MD.    Records Reviewed: Nursing Notes and Old Medical Records    Provider Notes (Medical Decision Making): The patient complains of nasal congestion, rhinorrhea, and cough. DDx: viral URI, acute bronchitis, bacterial sinusitis vs. pharyngitis, migraine, flu. Will get CXR to r/o PNA and treat symptoms. ED Course:   Initial assessment performed. The patients presenting problems have been discussed, and they are in agreement with the care plan formulated and outlined with them. I have encouraged them to ask questions as they arise throughout their visit. Disposition:  Discharge Note:  4:45 PM  The patient has been re-evaluated and is ready for discharge. Reviewed available results with patient. Counseled patient/parent/guardian on diagnosis and care plan. Patient has expressed understanding, and all questions have been answered. Patient agrees with plan and agrees to follow up as recommended, or return to the ED if their symptoms worsen. Discharge instructions have been provided and explained to the patient, along with reasons to return to the ED. PLAN:  1. Discharge Medication List as of 1/8/2018  3:28 PM      START taking these medications    Details   albuterol (PROVENTIL HFA, VENTOLIN HFA, PROAIR HFA) 90 mcg/actuation inhaler Take 2 Puffs by inhalation every four (4) hours as needed for Wheezing., Normal, Disp-1 Inhaler, R-1      chlorpheniramine-HYDROcodone (TUSSIONEX PENNKINETIC ER) 10-8 mg/5 mL suspension Take 5 mL by mouth every twelve (12) hours as needed for Cough.  Max Daily Amount: 10 mL., Print, Disp-60 mL, R-0         CONTINUE these medications which have NOT CHANGED    Details   valsartan (DIOVAN) 40 mg tablet Take 1 Tab by mouth daily. , Print, Disp-30 Tab, R-0      potassium chloride SR (K-TAB) 20 mEq tablet Take 1 Tab by mouth daily. , Print, Disp-5 Tab, R-0      pravastatin (PRAVACHOL) 40 mg tablet Take 40 mg by mouth nightly., Historical Med      furosemide (LASIX) 40 mg tablet Take 40 mg by mouth daily as needed for Other (swelling). , Historical Med      celecoxib (CELEBREX) 200 mg capsule Take 200 mg by mouth daily as needed for Pain., Historical Med      cefUROXime (CEFTIN) 250 mg tablet Take 250 mg by mouth two (2) times a day., Historical Med      aspirin delayed-release 81 mg tablet Take 81 mg by mouth daily. , Historical Med           2. Follow-up Information     Follow up With Details Comments Contact Info    Provider Unknown  If symptoms worsen Patient not available to ask          Return to ED if worse     Diagnosis     Clinical Impression:   1. Acute bronchitis, unspecified organism      Attestations:    Attestation: This note is prepared by Aby Guzman, acting as MD Rock Tyler MD: The scribe's documentation has been prepared under my direction and personally reviewed by me in its entirety. I confirm that the note above accurately reflects all work, treatment, procedures, and medical decision making performed by me.

## 2018-01-08 NOTE — ED NOTES
Pt presents to ED for SOB, lack of appetite, and dizziness x weeks. Pt came here from [place]. Pt alert and oriented x 4. Pt able to stand from wheelchair and transfer to bed. Pt reports her family has had colds and URI symptoms. Pt reports productive yellow mucus.

## 2018-01-08 NOTE — ED NOTES
Dr Carlos Burk reviewed discharge instructions with the patient. The patient verbalized understanding. All questions and concerns were addressed. The patient declined a wheelchair and is discharged ambulatory in the care of family members with instructions and prescriptions in hand. Pt is alert and oriented x 4. Respirations are clear and unlabored.

## 2019-07-24 ENCOUNTER — TELEPHONE (OUTPATIENT)
Dept: DERMATOLOGY | Facility: AMBULATORY SURGERY CENTER | Age: 84
End: 2019-07-24

## 2019-08-07 NOTE — TELEPHONE ENCOUNTER
Mohs Pre-Op Assessment    Patient Appointment Date: 08/15/19 @0900    Denise Yuan, 80 y.o., female      does confirm site: 1. Right nasal ala. 2. Left nasal ala  Brief description of tumor: BCC  does not know ID site. (Can they still visibly see the site)  does not have Hepatitis C   does not have HIV (If YES, set up consult appointment)    Allergies: Allergies   Allergen Reactions    Seafood Hives     shellfish    Sulfa (Sulfonamide Antibiotics) Other (comments)     Isn't able to recall reaction at this time. does not have an Electrical Implanted Device (Pacemaker, AICD, brain stimulator, etc.)    does not need antibiotics  If yes, antibiotic used:NONE; and needs it because NONE (valve replacement, etc.)  Can patient get antibiotic from primary care provider NO    is not taking NSAIDs  If yes, is taking NONE, and was asked to d/c 2 days prior to surgery and informed to resume taking 2 days after surgery. Patient can switch to a Tylenol based medication. is not taking aspirin  If yes, is taking because of NONE (i.e. heart attack, stroke, TIA, bypass surgery, etc.)    is not taking Garlic  is not taking Ginkgo  is not taking Ginseng  is not taking Fish oils  is not taking Vit E    does not take a blood thinner(i.e. Coumadin/Warfarin, Plavix, Brilinta, Pradaxa, Xarelto, Effient, Eliquis, Savaysa)  If taking Coumadin needs to have PT/INR drawn and faxed results within a week of surgery    does not have a blood disorder (CLL, AML, PV)  is not on Meds for blood disorder, pt on:  is not on Chemo for blood disorder    has not had a organ transplant  has not had a bone marrow transplant      Pre operative assessment questions asked to patient. Patient has a general understanding of the procedure, and has been versed that there will be local anesthesia used in the procedure and that She will be ok to drive themselves to and from the appointment.      Patient has been notified to arrive 15-20 minutes early and they may eat or drink before arriving.

## 2019-08-07 NOTE — TELEPHONE ENCOUNTER
Pt's daughter return call for pre-op instructions.  Pt's daughter can be best reached at  [phone]

## 2019-08-15 ENCOUNTER — OFFICE VISIT (OUTPATIENT)
Dept: DERMATOLOGY | Facility: AMBULATORY SURGERY CENTER | Age: 84
End: 2019-08-15

## 2019-08-15 VITALS
HEIGHT: 64 IN | TEMPERATURE: 97.8 F | WEIGHT: 174 LBS | OXYGEN SATURATION: 98 % | BODY MASS INDEX: 29.71 KG/M2 | HEART RATE: 69 BPM | DIASTOLIC BLOOD PRESSURE: 82 MMHG | SYSTOLIC BLOOD PRESSURE: 132 MMHG

## 2019-08-15 DIAGNOSIS — C44.311 BASAL CELL CARCINOMA (BCC) OF RIGHT ALA NASI: Primary | ICD-10-CM

## 2019-08-15 RX ORDER — SODIUM FLUORIDE 1.1 G/100G
GEL, DENTIFRICE ORAL
Refills: 99 | COMMUNITY
Start: 2019-06-09 | End: 2021-12-08

## 2019-08-15 RX ORDER — FLUTICASONE PROPIONATE 50 MCG
SPRAY, SUSPENSION (ML) NASAL
Refills: 11 | COMMUNITY
Start: 2019-05-14 | End: 2022-06-01

## 2019-08-15 RX ORDER — TELMISARTAN 40 MG/1
TABLET ORAL
Refills: 5 | COMMUNITY
Start: 2019-05-30 | End: 2021-12-08

## 2019-08-15 RX ORDER — CETIRIZINE HCL 10 MG
TABLET ORAL
Refills: 11 | COMMUNITY
Start: 2019-06-23

## 2019-08-15 RX ORDER — CEPHALEXIN 500 MG/1
500 CAPSULE ORAL 3 TIMES DAILY
Qty: 21 CAP | Refills: 0 | Status: SHIPPED | OUTPATIENT
Start: 2019-08-15 | End: 2019-08-22

## 2019-08-15 NOTE — PATIENT INSTRUCTIONS
WOUND CARE INSTRUCTIONS     1. Keep the dressing clean and dry and do not remove for 48 hours. 2. Then change the dressing once a day as follows:  a. Wash hands before and after each dressing change. b. Remove dressing and wash site gently with mild soap and water, rinse, and pat dry.  c. Apply liberal amounts of an ointment (Petroleum jelly or Aquaphor). d. Apply a non-stick (Telfa) dressing or Band-Aid to cover the wound. 3. Watch for:  BLEEDING: A small amount of drainage may occur. If bleeding occurs, elevate and rest the surgery site. Apply gauze and steady pressure for 20 minutes. If bleeding continues repeat pressure once more. If bleeding still does not stop, call this office. If after hours, call Dr. Michelle Felix at [phone]. INFECTION: Signs of infection include increased redness, pain, warmth, drainage of pus, and fever. If this occurs, please call this office. 4. Special Instructions (follow any that are checked):  · [x] You have stitches that DO need to be removed. · [x] Avoid bending at the waist and heavy lifting for two days. · [x] Sleep with your head elevated for the next two nights. · [x] Rest the surgery site and keep it elevated as much as possible for two days. · [x] You may apply an ice-pack for 10-15 minutes every waking hour for the rest of the day. · [] Eat a soft diet and avoid hot food and hot drinks for the rest of the day. · [] Other instructions: Follow up as directed. Take Tylenol or Ibuprofen for pain as needed. Once the site is healed with no remaining bandages or open areas, protect your surgical site and scar from the sun, as this area will be more sensitive. Use a broad spectrum sunscreen SPF 30 or higher daily, and a chemical free product (one containing zinc oxide or titanium dioxide) is a good choice if the area is sensitive. You may begin to gently massage the surgical site in 3 weeks, rubbing in a circular motion along the scar.  This can help reduce swelling and thickness of a scar. If you have any questions or concerns, please call our office Monday through Friday at [phone]. If after hours, please call Dr. Paris Dickson at [phone].

## 2019-08-15 NOTE — PROGRESS NOTES
Basilia Estevez is a 80 y.o. female who is seen post-Mohs surgery to evaluate:  Chief Complaint:  Wound resulting from surgical extirpation of a skin cancer. HPI: Biopsy-proven basal cell carcinoma on the right ala s/p 2 stages of Mohs surgery resulting in tumor-free margins. The size of the defect is 1.5 x 1.4 cm. The site is bleeding and is not painful. The tumor was removed on 8/15/2019. Outpatient Encounter Medications as of 8/15/2019   Medication Sig Dispense Refill    cetirizine (ZYRTEC) 10 mg tablet TAKE 1 TABLET BY MOUTH EVERY DAY  11    DENTA 5000 PLUS 1.1 % crea BRUSH TEETH WITH PASTE 1-2 TIMES PER DAY, EXPECTORATE WELL. DO NOT EAT, DRINK, RINSE AFTER 30 MINS. 99    fluticasone propionate (FLONASE) 50 mcg/actuation nasal spray USE 1 SPRAY INTO EACH NOSTRIL EVERY DAY  11    telmisartan (MICARDIS) 40 mg tablet TAKE 1 TABLET BY MOUTH EVERY DAY  5    cephALEXin (KEFLEX) 500 mg capsule Take 1 Cap by mouth three (3) times daily for 7 days. 21 Cap 0    albuterol (PROVENTIL HFA, VENTOLIN HFA, PROAIR HFA) 90 mcg/actuation inhaler Take 2 Puffs by inhalation every four (4) hours as needed for Wheezing. 1 Inhaler 1    chlorpheniramine-HYDROcodone (TUSSIONEX PENNKINETIC ER) 10-8 mg/5 mL suspension Take 5 mL by mouth every twelve (12) hours as needed for Cough. Max Daily Amount: 10 mL. 60 mL 0    valsartan (DIOVAN) 40 mg tablet Take 1 Tab by mouth daily. 30 Tab 0    potassium chloride SR (K-TAB) 20 mEq tablet Take 1 Tab by mouth daily. 5 Tab 0    pravastatin (PRAVACHOL) 40 mg tablet Take 40 mg by mouth nightly.  furosemide (LASIX) 40 mg tablet Take 40 mg by mouth daily as needed for Other (swelling).  celecoxib (CELEBREX) 200 mg capsule Take 200 mg by mouth daily as needed for Pain.  cefUROXime (CEFTIN) 250 mg tablet Take 250 mg by mouth two (2) times a day.  aspirin delayed-release 81 mg tablet Take 81 mg by mouth daily.        No facility-administered encounter medications on file as of 8/15/2019. Allergies   Allergen Reactions    Seafood Hives     shellfish    Sulfa (Sulfonamide Antibiotics) Other (comments)     Isn't able to recall reaction at this time. Social History     Tobacco Use   Smoking Status Never Smoker   Smokeless Tobacco Never Used       Physical Exam   Constitutional: She is oriented to person, place, and time and well-developed, well-nourished, and in no distress. HENT:   Head: Normocephalic. Eyes: EOM are normal.   Pulmonary/Chest: Effort normal.   Neurological: She is alert and oriented to person, place, and time. Skin:   On the left nasal ala there is a 1.5 cm bleeding surgical defect that extends partially over the alar sulcus and onto the medial cheek. The left ala is moderately flaccid on forced inspiration. Evaluation and Management:  There are symptomatic surgical defects as noted above. Treatment options discussed with the patient include second intent healing, primary closure, adjacent tissue transfer (flap), and skin graft (full or split thickness) with cartilage grafting. Following evaluation of the defect(s) and discussion with the patient regarding risks and benefits of the various options, the plan to repair the defect with a(n) transposition flap and cartilage graft was chosen. This repair was chosen in order to avoid excessive wound tension, preserve anatomic form or function and avoid free margin distortion. The wound management options of second intent healing, layered closure, local flap, or full thickness skin graft were discussed. Ms. Lit Love understands the aims, risks, alternatives, and possible complications and elects to proceed with a transposition flap. Ms. Lit Love was placed in a supine position on the operating table in the Mohs surgery procedure room. The area was prepped and draped in the standard manner.   Gentian violet was used to outline the proposed flap.  1% lidocaine with epinephrine 1:100,000 was used to supplement the already existing anesthesia. The anterior and posterior skin of the right antihelix were anesthetized with 1% lidocaine and epinephrine 1: 100,000. An approximately 2 cm incision was made along the antihelix to the cartilage. A flap of skin was reflected laterally in order to expose a broad area of antihelical cartilage. An approximately 1.5 cm strip of antihelical cartilage was incised with a #15 blade and removed, maintaining the perichondrium on the posterior side of the cartilage. 6-0 Prolene cuticular sutures were used to reapproximate the skin flap and a quilting suture was placed to close the dead space. A #15 blade was used to create pockets in the fibrofatty tissue of the medial and lateral portions of the defect and the cartilage graft was trimmed to fit appropriately. The cartilage was inserted into the tissue pockets and 5-0 Monocryl was used to secure the cartilage graft. This portion of the procedure was performed in order to avoid contraction of the ala. Attention was then turned to the cheek. The wound penetrated to the subcutaneous fat layer and measured 1.5 x 1.4 cm. The wound margins were debeveled and the flap was incised using a #15 blade held perpendicularly to the skin surface. The flap and defect margins were widely undermined in the subcutaneous plane. Hemostasis was obtained with spot electrocoagulation. The flap was transposed into place. 5-0 monocryl buried vertical mattress sutures were used to approximate the deep tissues and dermis. 6-0 prolene and 6-0 ethilon epidermal sutures were used to approximate the skin edges with careful attention to apposition and eversion. The final flap measures 4.5 x 3.5 cm. A pressure dressing utilizing petrolatum ointment, Telfa, gauze and Coverroll was placed. Wound care instructions (written and/or verbal) and a follow up appointment were given to the patient before discharge.   Ms. Mildred Singh was discharged in good condition. Follow-up and Dispositions    · Return in about 1 week (around 8/22/2019) for suture removal , Suture removal.         Rita Wells MD    [address]   OFFICE PROCEDURE PROGRESS NOTE   Chart reviewed for the following:   Beena Staley MD have reviewed the History, Physical and updated the Allergic reactions for Courtney Varela. TIME OUT performed immediately prior to start of procedure:   Beena Staley MD, have performed the following reviews on Lupis Yuan   prior to the start of the procedure:     * Patient was identified by name and date of birth   * Agreement on procedure being performed was verified   * Risks and Benefits explained to the patient   * Procedure site verified and marked as necessary   * Patient was positioned for comfort   * Consent was signed and verified     Time: 9 AM  Date of procedure: 8/15/2019  Procedure performed by:  Leo Christy MD  Provider assisted by: Socorro Shields RN  Patient assisted by: self   How tolerated by patient: tolerated the procedure well with no complications   Comments: none

## 2019-08-15 NOTE — PROGRESS NOTES
Progress note for Mohs surgery patient:    Chief Complaint:  Basal cell carcinoma of the Right nasal ala    HPI:  Roberth Jones is a 80y.o. year old female referred by  REGI Jasmine for Mohs surgery to treat the following lesion:  Lesion Info  Location: Right nasal ala  Size: 1.0 x 0.8 cm  Type: Basal  Duration: several years  Path Lab: [facility] #: [id number]  Prior Treatment: none     Symptoms of the lesion include none. ROS:  Kinsey Domínguez is feeling well and in their usual state of health today. She is not in pain. She does not have any other skin concerns. Exam:  Kinsey Domínguez is an awake, alert, oriented, well-appearing female in no distress. There is not preauricular, submandibular, or cervical lymphadenopathy. The face was examined. Findings are:  On the right ala there is a pearly telangiectatic plaque. On the left anterior ala there is a pearly telangiectatic papule. On the left nasal sidewall there is a healed scar. On the left forehead there is a large pearly telangiectatic plaque. A/P:  Basal cell carcinoma of the Right nasal ala. The diagnosis was reviewed. The Mohs surgery procedure was reviewed. Indications, risks, and options were discussed with Ms. Yuan preoperatively. Risks including, but not limited to: pain, bleeding, infection, tumor recurrence, scarring and damage to motor and/or sensory nerves, were discussed. Ms. Shruti Miguel chose Mohs surgery. Ms. Shruti Miguel was an acceptable surgery candidate. I performed Mohs surgery using standard technique after verbal and written consent were obtained. The lesion was identified and confirmed with the patient and photograph, if available. The surgical site was marked with gentian violet, prepped, draped and anesthetized in standard fashion. The tumor was debulked by curettage and orientation hashes were placed. The tumor and any associated scar was excised using beveled incision.   Hemostasis was achieved, the site was bandaged, and the tissue was transported to the Mohs lab. While maintaining anatomic orientation the tissue was divided, if needed, and marked with colored inks that were noted on the corresponding Mohs map. The tissue was prepared by Mohs en-face technique for fresh frozen section analysis. The resulting slides were examined for residual tumor, scar and other concerns, all of which were marked on the corresponding Mohs map, if present. The Mohs map was used to guide subsequent stages of surgery, if necessary, and the above process was repeated until a tumor-free plane was achieved. Once the tumor was cleared the map was marked as such and signed. Dr. Vanessa Becerril acted as surgeon and pathologist for the entire case, performing all stages of the surgical excision as well as examination and interpretation of the histologic slides. See table below for details regarding the surgical case. 2 stage(s) were required to reach a tumor-free plane, resulting in a 1.5 x 1.4 cm defect extending to the subcutaneous fat. There were not complications. Axel Lamb will follow up as needed in the postoperative period. Regular skin examinations will be with  REGI Bernardo. See repair note for reconstructive details.             Right nasal ala  Mohs Lesion Operative Report  Date: 08/15/19  Room: procedure rooms 1 and 2  Indications: Poor definition, Site, Size  Pre-op Meds: n/a  Pre-op BP: 142/84  Pre-op pulse: 71  1st Assistant: Dylan Bailey RN and Kim Rivera LPN  Stage #: 2  Stage 1 Sections: 1  Stage 1 # Pos: 1  Stage 2 Sections: 1  Stage 2 # POS: 0  Perineural Involvement: No  Lymphadenopathy: No  Defect Size: 1.5 x 1.4 cm  Depth: subcutaneous fat  Wound Mgt: transpositional flap  Donor Site: n/a  Suture: Buried, Surface  Buried details: 5.0 monocryl  Surface Details: 6.0 prolene  Undermining: SubQ  Estimated Blood Loss: 2 mL  Hemostasis: Electrosurgery, Pressure  Anesthesia: 1% Lidocaine w/1:100,000 epi  1% Lidocaine: 27 cc  Complications: n/a  Dressing: vaseline, telfa, gauze, medipore tape  Post-op BP: 132/82  Post-op Pulse: 69  Pos-op Meds: Cephalexin 500 mg, 1 cap PO 3 times daily x 7 days  W/C Instructions: Verbal, Written  Follow-up: 1 week for suture removal and wound check     [facility]   OFFICE PROCEDURE PROGRESS NOTE   Chart reviewed for the following:   Bony Mcmahan MD have reviewed the History, Physical and updated the Allergic reactions for Courtney Varela. TIME OUT performed immediately prior to start of procedure:   Bony Mcmahan MD, have performed the following reviews on Lupis Yuan   prior to the start of the procedure:     * Patient was identified by name and date of birth   * Agreement on procedure being performed was verified   * Risks and Benefits explained to the patient   * Procedure site verified and marked as necessary   * Patient was positioned for comfort   * Consent was signed and verified     Time: 9 AM  Date of procedure: 8/15/2019  Procedure performed by:  Tonia Phalen, MD  Provider assisted by: Wilma Gill RN and Fernando Jones LPN  Patient assisted by: self   How tolerated by patient: tolerated the procedure well with no complications   Comments: none

## 2019-08-15 NOTE — LETTER
8/15/2019 5:53 PM 
 
Patient:  Denise Yuan YOB: 1933 Date of Visit: 8/15/2019 Dear [place] Pete., [place] 
[address] [place] [address] [phone] VIA Facsimile: [phone] Thank you for referring Denise Yuan to me for evaluation/treatment. Below are the relevant portions of my assessment and plan of care. Ms. Kolton Allen presented today for Mohs surgery to treat a biopsy-proven basal cell carcinoma of the right nasal ala. 2 stage(s) of Mohs surgery were required to achieve tumor free margins. I repaired the defect with a(n) transposition flap and cartilage graft. She tolerated the procedure well. Please see the attached procedure note(s) for additional details. Ms. Kolton Allen will return to me for suture removal and/or wound checks at an appropriate interval and I will follow-up with her regarding any issues arising from or relating to this surgery. I will otherwise defer any additional dermatologic care back to you. Of note, the patient pointed out today a large plaque on her left forehead that is also suspicious for basal cell carcinoma. I will defer further work-up for this back to you. If you have questions, please do not hesitate to call me. I look forward to following Ms. Yuan along with you. Sincerely, Rita Wells MD 
[phone] (cell)

## 2019-08-22 ENCOUNTER — OFFICE VISIT (OUTPATIENT)
Dept: DERMATOLOGY | Facility: AMBULATORY SURGERY CENTER | Age: 84
End: 2019-08-22

## 2019-08-22 DIAGNOSIS — C44.311 BASAL CELL CARCINOMA (BCC) OF RIGHT ALA NASI: Primary | ICD-10-CM

## 2019-08-23 NOTE — PROGRESS NOTES
Wound check/suture removal:    Chief complaint: wound check. HPI: Alysha Yuan presents for wound check following Mohs surgery to treat a biopsy-proven basal cell carcinoma of the right alar repaired with a cartilage graft and nasolabial transposition flap performed 1 week ago. Exam: The surgical site was examined. There is not evidence of infection. There is erythema. There is edema. There is mild early pincushioning of the flap. A/P:  Wound check. The surgical site is healing well. Additional care was reviewed including liberal application of Vaseline several times daily and gentle scar massage starting at 3 weeks postop. Follow up will be in 2 weeks.

## 2019-09-05 ENCOUNTER — OFFICE VISIT (OUTPATIENT)
Dept: DERMATOLOGY | Facility: AMBULATORY SURGERY CENTER | Age: 84
End: 2019-09-05

## 2019-09-05 DIAGNOSIS — C44.311 BASAL CELL CARCINOMA (BCC) OF RIGHT ALA NASI: Primary | ICD-10-CM

## 2019-09-05 NOTE — PROGRESS NOTES
Wound check/suture removal:    Chief complaint: wound check. HPI: [place] Josué Yuan presents for wound check following Mohs surgery to treat a biopsy-proven basal cell carcinoma of the right ala repaired with melolabial transposition flap performed 3 weeks ago. Exam: The surgical site was examined. There is not evidence of infection. There is erythema. There is edema. There is mild pincushioning of the flap. A/P:  Wound check. The surgical site is healing well. Additional care was reviewed including liberal application of Vaseline several times daily and gentle scar massage starting at 3 weeks postop. Follow up will be in several weeks for Mohs surgery on the left side of the nose. We will consider revision for the alar groove on the right after the flap is matured.

## 2019-10-24 ENCOUNTER — OFFICE VISIT (OUTPATIENT)
Dept: DERMATOLOGY | Facility: AMBULATORY SURGERY CENTER | Age: 84
End: 2019-10-24

## 2019-10-24 VITALS
BODY MASS INDEX: 29.71 KG/M2 | HEART RATE: 80 BPM | OXYGEN SATURATION: 97 % | RESPIRATION RATE: 14 BRPM | WEIGHT: 174 LBS | SYSTOLIC BLOOD PRESSURE: 150 MMHG | TEMPERATURE: 97 F | DIASTOLIC BLOOD PRESSURE: 84 MMHG | HEIGHT: 64 IN

## 2019-10-24 DIAGNOSIS — C44.319 BASAL CELL CARCINOMA (BCC) OF LEFT FOREHEAD: Primary | ICD-10-CM

## 2019-10-24 RX ORDER — CEPHALEXIN 500 MG/1
500 CAPSULE ORAL 3 TIMES DAILY
Qty: 21 CAP | Refills: 0 | Status: SHIPPED | OUTPATIENT
Start: 2019-10-24 | End: 2019-10-31

## 2019-10-24 NOTE — PROGRESS NOTES
Progress note for Mohs surgery patient:    Chief Complaint:  Basal cell carcinoma of the Left superior central forehead    HPI:  Delmis Jameson is a 80y.o. year old female referred by  ERGI Uriostegui for Mohs surgery to treat the following lesion:  Lesion Info  Location: Left superior central forehead  Size: 2.4 x 1.8 cm  Type: Basal  Duration: 2+ years  Path Lab: [facility]  Path #: [id number]  Prior Treatment: none     Symptoms of the lesion include increase in size. ROS:  Yvan Villafana is feeling well and in their usual state of health today. She is not in pain. She does not have any other skin concerns. Exam:  Yvan Villafana is an awake, alert, oriented, well-appearing female in no distress. There is not preauricular, submandibular, or cervical lymphadenopathy. The face was examined. Findings are:  On the left forehead there is a broad pearly telangiectatic plaque. A/P:  Basal cell carcinoma of the Left superior central forehead. The diagnosis was reviewed. The Mohs surgery procedure was reviewed. Indications, risks, and options were discussed with Ms. Yuan preoperatively. Risks including, but not limited to: pain, bleeding, infection, tumor recurrence, scarring and damage to motor and/or sensory nerves, were discussed. Ms. Bradly Parrish chose Mohs surgery. Ms. Bradly Parrish was an acceptable surgery candidate. I performed Mohs surgery using standard technique after verbal and written consent were obtained. The lesion was identified and confirmed with the patient and photograph, if available. The surgical site was marked with gentian violet, prepped, draped and anesthetized in standard fashion. The tumor was debulked by curettage and orientation hashes were placed. The tumor and any associated scar was excised using beveled incision. Hemostasis was achieved, the site was bandaged, and the tissue was transported to the Mohs lab.   While maintaining anatomic orientation the tissue was divided, if needed, and marked with colored inks that were noted on the corresponding Mohs map. The tissue was prepared by Mohs en-face technique for fresh frozen section analysis. The resulting slides were examined for residual tumor, scar and other concerns, all of which were marked on the corresponding Mohs map, if present. The Mohs map was used to guide subsequent stages of surgery, if necessary, and the above process was repeated until a tumor-free plane was achieved. Once the tumor was cleared the map was marked as such and signed. Dr. Jd Kovacs acted as surgeon and pathologist for the entire case, performing all stages of the surgical excision as well as examination and interpretation of the histologic slides. See table below for details regarding the surgical case. 2 stage(s) were required to reach a tumor-free plane, resulting in a 3.0 x 2.6 cm defect extending to the muscle. There were not complications. Reymundo Duque will follow up as needed in the postoperative period. Regular skin examinations will be with  Payton DELUNA. The wound management options of second intent healing, layered closure, local flap, or full thickness skin graft were discussed. Ms. Lashae Boykin understands the aims, risks, alternatives, and possible complications and elects to proceed with a full thickness skin graft. Ms. Lashae Boykin was placed in a supine position on the operating table in the Mohs surgery procedure room. The area was prepped and draped in the standard manner. A template of donor skin the size of the wound was drawn at the inferior forehead. 1% lidocaine with epinephrine 1:100,000 was used to anesthetize the donor area. The graft was removed from the donor area and defatted. Hemostasis was obtained with spot electrocoagulation. The margins of the donor site were undermined and then the site was sutured with 4-0 vicryl and 5-0 fast absorbing gut to approximate the skin edges.   The defatted graft was placed on the recipient site, trimmed to size and anchored in place with a running 5-0 fast absorbing gut suture around the margin. A bolster was applied. The size of the graft was 2.2 x 2.0 cm. A pressure dressing utilizing Petrolatum ointment, Telfa, gauze and Coverroll was placed on the donor site. Wound care instructions (written and/or verbal) and a follow up appointment were given to the patient before discharge. Ms. Bg Mohamud was discharged in good condition. Left superior central forehead  Mohs Lesion Operative Report  Date: 10/24/19  Room: exam 2  Indications: Poor definition, Site, Size  Pre-op Meds: n/a  Pre-op BP: 164/82  Pre-op pulse: 82  1st Assistant: Rome Peterson RN  Stage #: 2  Stage 1 Sections: 1  Stage 1 # Pos: 1  Stage 2 Sections: 1  Stage 2 # POS: 0  Perineural Involvement: No  Lymphadenopathy: No  Defect Size: 3.0 x 2.6 cm  Depth: muscle  Wound Mgt: graft  Donor Site: adjacent tissue  Undermining: SubQ  Closure Length: 2.2 x 2.0 cm  Estimated Blood Loss: 2 mL  Hemostasis: Electrosurgery, Pressure  Anesthesia: 1% Lidocaine w/1:100,000 epi  1% Lidocaine: 12 cc  Complications: n/a  Dressing: vaseline, xeroform, steristrips, telfa, gauze, medipore tape  Post-op BP: 150/84  Post-op Pulse: 80  Pos-op Meds: Keflex 500 mg, 1 cap PO TID x 7 days  W/C Instructions: Verbal, Written  Follow-up: 1 week for bolster removal and wound check     [facility]   OFFICE PROCEDURE PROGRESS NOTE   Chart reviewed for the following:   Geovanny Norris MD have reviewed the History, Physical and updated the Allergic reactions for Courtney Varela.     TIME OUT performed immediately prior to start of procedure:   Geovanny Norris MD, have performed the following reviews on Lupis Yuan   prior to the start of the procedure:     * Patient was identified by name and date of birth   * Agreement on procedure being performed was verified   * Risks and Benefits explained to the patient   * Procedure site verified and marked as necessary   * Patient was positioned for comfort   * Consent was signed and verified     Time: 8:15 AM  Date of procedure: 10/24/2019  Procedure performed by:  Uriel Bean MD  Provider assisted by: Mireya Bethea RN  Patient assisted by: self   How tolerated by patient: tolerated the procedure well with no complications   Comments: none

## 2019-10-24 NOTE — LETTER
10/24/2019 3:23 PM 
 
Patient:  Rosa Yuan YOB: 1933 Date of Visit: 10/24/2019 Dear REGI Cespedes 
[address] [place] [address] [phone] VIA Facsimile: [phone] Thank you for referring Rosa Yuan to me for evaluation/treatment. Below are the relevant portions of my assessment and plan of care. Ms. Kavin Marcial presented today for Mohs surgery to treat a biopsy-proven basal cell carcinoma of the left superior central forehead. 2 stage(s) of Mohs surgery were required to achieve tumor free margins. I repaired the defect with a(n) Burow's graft. She tolerated the procedure well. Please see the attached procedure note(s) for additional details. Ms. Kavin Marcial will return to me for suture removal and/or wound checks at an appropriate interval and I will follow-up with her regarding any issues arising from or relating to this surgery. I will otherwise defer any additional dermatologic care back to you. If you have questions, please do not hesitate to call me. I look forward to following Ms. Yuan along with you. Sincerely, Boone Etienne MD 
[phone] (cell)

## 2019-10-24 NOTE — PATIENT INSTRUCTIONS
Chief Complaint   Patient presents with    Surgery     Mohs - Left ala, BCC     WOUND CARE INSTRUCTIONS     1. Keep the dressing clean and dry and do not remove for 1 WEEK. a. Dressing will be changed at next visit. White bandages can be reinforced if needed. 2. Watch for:  BLEEDING: A small amount of drainage may occur. If bleeding occurs, elevate and rest the surgery site. If bleeding still does not stop, call this office. If after hours, call Dr. Violet Mendez at [phone]. INFECTION: Signs of infection include increased redness, pain, warmth, drainage of pus, and fever. If this occurs, please call this office. 3. Special Instructions (follow any that are checked):  · [x] You have stitches that DO NOT need to be removed. · [x] Avoid bending at the waist and heavy lifting for two days. · [x] Sleep with your head elevated for the next two nights. · [x] Rest the surgery site and keep it elevated as much as possible for two days. Keep your head above your heart for 2 full days. [] You may apply an ice-pack for 10-15 minutes every waking hour for the rest of the day. · [] Eat a soft diet and avoid hot food and hot drinks for the rest of the day. · [] Other instructions: Follow up as directed. Take Tylenol for pain as needed. If you have any questions or concerns, please call our office Monday through Friday at [phone]. If after hours, please call Dr. Violet Mendez at [phone].

## 2019-10-25 ENCOUNTER — TELEPHONE (OUTPATIENT)
Dept: DERMATOLOGY | Facility: AMBULATORY SURGERY CENTER | Age: 84
End: 2019-10-25

## 2019-10-25 NOTE — TELEPHONE ENCOUNTER
Patient called asking for information about the prescription that was prescribed for her. Asked if someone could please call her back at [phone] when you get a chance.     Thanks,

## 2019-10-25 NOTE — TELEPHONE ENCOUNTER
11:46 AM  Patient called to make sure the prescribed antibiotic, Keflex, was not in the class of antibiotics she is allergic to. RN advised patient Keflex is neither a Sulfa or PCN antibiotic and verified that she took the same medication after her previous Mohs procedure. Patient verified that she had no adverse effects from that course of Keflex. Patient expressed understanding of information. Patient also stated that she has a black eye this morning, but she expected it after the surgery on her forehead and is otherwise doing well.

## 2019-10-30 ENCOUNTER — OFFICE VISIT (OUTPATIENT)
Dept: DERMATOLOGY | Facility: AMBULATORY SURGERY CENTER | Age: 84
End: 2019-10-30

## 2019-10-30 DIAGNOSIS — C44.319 BASAL CELL CARCINOMA (BCC) OF LEFT FOREHEAD: Primary | ICD-10-CM

## 2019-10-30 NOTE — PROGRESS NOTES
Wound check/suture removal:    Chief complaint: wound check. HPI: Christine Yuan presents for wound check following Mohs surgery to treat a biopsy-proven basal cell carcinoma of the left forehead repaired with a Burow's graft performed less than a week ago. The patient returned to clinic early because she forgot that she was not supposed to remove her bandage and bolster. She removed both the bandage and the bolster and has been applying rubbing alcohol and an unknown ointment to the area. Exam: The surgical site was examined. There is not evidence of infection. There is erythema. There is edema. A/P:  Wound check. The surgical site is healing well, however the skin graft has failed. Additional care was reviewed including liberal application of Vaseline several times daily. Follow up will be in 2 weeks.

## 2019-11-14 ENCOUNTER — OFFICE VISIT (OUTPATIENT)
Dept: DERMATOLOGY | Facility: AMBULATORY SURGERY CENTER | Age: 84
End: 2019-11-14

## 2019-11-14 DIAGNOSIS — C44.319 BASAL CELL CARCINOMA (BCC) OF LEFT FOREHEAD: Primary | ICD-10-CM

## 2019-11-14 NOTE — PROGRESS NOTES
Wound check/suture removal:    Chief complaint: wound check. HPI: Monik Yuan presents for wound check following Mohs surgery to treat a biopsy-proven basal cell carcinoma on the left forehead repaired with a Burow's graft performed about 3 weeks ago. Of note, during the first week postoperatively the patient removed her bandage and scrubbed the graft site resulting in graft failure. The wound is been granulating since that time. Exam: The surgical site was examined. There is not evidence of infection. There is erythema. There is not edema. A/P:  Wound check. The surgical site is healing well. Additional care was reviewed including liberal application of Vaseline several times daily and gentle scar massage starting at 3 weeks postop. Follow up will be in 4 weeks.

## 2019-12-13 ENCOUNTER — OFFICE VISIT (OUTPATIENT)
Dept: DERMATOLOGY | Facility: AMBULATORY SURGERY CENTER | Age: 84
End: 2019-12-13

## 2019-12-13 DIAGNOSIS — C44.319 BASAL CELL CARCINOMA (BCC) OF LEFT FOREHEAD: Primary | ICD-10-CM

## 2019-12-13 RX ORDER — TIMOLOL MALEATE 5 MG/ML
SOLUTION/ DROPS OPHTHALMIC
Qty: 10 ML | Refills: 1 | Status: SHIPPED | OUTPATIENT
Start: 2019-12-13 | End: 2022-06-01

## 2019-12-13 NOTE — PROGRESS NOTES
Wound check/suture removal:    Chief complaint: wound check. HPI: Monik Yuan presents for wound check following Mohs surgery to treat a biopsy-proven basal cell carcinoma on the left forehead repaired with a Burow's graft performed about 6 weeks ago. The week after her surgery the patient removed her bandage and proceeded to scrub the wound, resulting in graft failure. The wound has been granulating since that time. She is otherwise had no problems. Exam: The surgical site was examined. There is not evidence of infection. There is erythema. There is edema. At the site of the failed graft there is hyperplastic granulation tissue. A/P:  Wound check. The surgical site is healing well despite the focus of hyperplastic granulation tissue. Additional care was reviewed including liberal application of Vaseline several times daily and gentle scar massage starting at 3 weeks postop. Follow up will be in about 1 month for Mohs surgery on the left nasal alar groove. Hyperplastic granulation tissue was anesthetized and shaved flush with a derma blade. Electrocautery was used to achieve hemostasis. Topical timolol will be applied twice daily until healed.

## 2020-01-23 ENCOUNTER — OFFICE VISIT (OUTPATIENT)
Dept: DERMATOLOGY | Facility: AMBULATORY SURGERY CENTER | Age: 85
End: 2020-01-23

## 2020-01-23 ENCOUNTER — TELEPHONE (OUTPATIENT)
Dept: DERMATOLOGY | Facility: AMBULATORY SURGERY CENTER | Age: 85
End: 2020-01-23

## 2020-01-23 VITALS
HEIGHT: 64 IN | SYSTOLIC BLOOD PRESSURE: 140 MMHG | OXYGEN SATURATION: 98 % | BODY MASS INDEX: 29.71 KG/M2 | HEART RATE: 90 BPM | WEIGHT: 174 LBS | RESPIRATION RATE: 14 BRPM | DIASTOLIC BLOOD PRESSURE: 78 MMHG | TEMPERATURE: 97.6 F

## 2020-01-23 DIAGNOSIS — C44.311 BASAL CELL CARCINOMA (BCC) OF LEFT ALA NASI: Primary | ICD-10-CM

## 2020-01-23 DIAGNOSIS — D48.5 NEOPLASM OF UNCERTAIN BEHAVIOR OF SKIN OF CHEEK: ICD-10-CM

## 2020-01-23 DIAGNOSIS — C44.319 BASAL CELL CARCINOMA (BCC) OF LEFT CHEEK: ICD-10-CM

## 2020-01-23 RX ORDER — ERYTHROMYCIN 5 MG/G
OINTMENT OPHTHALMIC
COMMUNITY
Start: 2020-01-16 | End: 2021-12-08

## 2020-01-23 RX ORDER — POLYMYXIN B SULFATE AND TRIMETHOPRIM 1; 10000 MG/ML; [USP'U]/ML
SOLUTION OPHTHALMIC
COMMUNITY
Start: 2019-12-27 | End: 2021-12-08

## 2020-01-23 RX ORDER — CEPHALEXIN 500 MG/1
500 CAPSULE ORAL 3 TIMES DAILY
Qty: 21 CAP | Refills: 0 | Status: SHIPPED | OUTPATIENT
Start: 2020-01-23 | End: 2020-01-30

## 2020-01-23 RX ORDER — AMLODIPINE BESYLATE 5 MG/1
TABLET ORAL
COMMUNITY
Start: 2019-11-25 | End: 2021-12-08

## 2020-01-23 NOTE — TELEPHONE ENCOUNTER
5:24 PM  I spoke with Leticia Rain, patient's daughter, to let her know Dr. Miladis Healy sent a rx into patient's pharmacy for an antibiotic that she should begin taking tomorrow morning. Leticia Rain expressed understanding.

## 2020-01-23 NOTE — LETTER
1/23/2020 5:32 PM 
 
Patient:  Nikki Yuan YOB: 1933 Date of Visit: 1/23/2020 Dear Shahana Salguero, PA 
[address] [place] [address] [phone] VIA Facsimile: [phone] Thank you for referring Nikki Yuan to me for evaluation/treatment. Below are the relevant portions of my assessment and plan of care. Ms. Tru Lowry presented today for Mohs surgery to treat a biopsy-proven basal cell carcinoma of the left ala. 1 stage(s) of Mohs surgery were required to achieve tumor free margins. I repaired the defect with a(n) melolabial interpolated flap and cartilage graft. She tolerated the procedure well. Please see the attached procedure note(s) for additional details. Ms. Tru Lowry will return to me for suture removal and/or wound checks at an appropriate interval and I will follow-up with her regarding any issues arising from or relating to this surgery. I will otherwise defer any additional dermatologic care back to you. Of note, because of other scars on the nose and the need for an interpolated flap from the cheek it was necessary to biopsy and treat an additional basal cell carcinoma on the left medial cheek so that it was not transferred with the flap. This required one stage of Mohs surgery and was repaired as part of the interpolated flap. If you have questions, please do not hesitate to call me. I look forward to following Ms. Yuan along with you. Sincerely, Alireza Rader MD 
[phone] (cell)

## 2020-01-23 NOTE — PROGRESS NOTES
Progress note for Mohs surgery patient:    Chief Complaint:  Basal cell carcinoma of the Left nasal ala and Basal of the Left nasal ala. HPI:  Galo Garcia is a 80y.o. year old female referred by   for Mohs surgery to treat the following lesions:  Lesion Info  Location: Left nasal ala  Size: 0.8 x 0.7 cm  Type: Basal  Duration: 1+ year  Path Lab: [facility] #: [id number]  Prior Treatment: none Lesion Info  Location: Left nasal ala  Size: 0.8 x 0.7 cm  Type: Basal  Duration: 1+ year  Path Lab: [place]  Path #: [id number]  Prior Treatment: none     Symptoms of the lesions include increase in size. ROS:  Mayra Fournier is feeling well and in their usual state of health today. She is not in pain. She does not have any other skin concerns. Exam:  Mayra Fournier is an awake, alert, oriented, well-appearing female in no distress. There is not preauricular, submandibular, or cervical lymphadenopathy. The face was examined. Findings are:  On the left ala there is a pearly telangiectatic plaque. On the left cheek there is a pearly telangiectatic papule. A/P:    1. 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 The diagnosis was reviewed. The Mohs surgery procedure was reviewed. Indications, risks, and options were discussed with Ms. Mcallisterrow preoperatively. Risks including, but not limited to: pain, bleeding, infection, tumor recurrence, scarring and damage to motor and/or sensory nerves, were discussed. Ms. Steffi Langston chose Mohs surgery. Ms. Steffi Langston was an acceptable surgery candidate. I performed Mohs surgery using standard technique after verbal and written consent were obtained. The lesion was identified and confirmed with the patient and photograph, if available. The surgical site was marked with gentian violet, prepped, draped and anesthetized in standard fashion. The tumor was debulked by curettage and orientation hashes were placed.   The tumor and any associated scar was excised using beveled incision. Hemostasis was achieved, the site was bandaged, and the tissue was transported to the Mohs lab. While maintaining anatomic orientation the tissue was divided, if needed, and marked with colored inks that were noted on the corresponding Mohs map. The tissue was prepared by Mohs en-face technique for fresh frozen section analysis. The resulting slides were examined for residual tumor, scar and other concerns, all of which were marked on the corresponding Mohs map, if present. The Mohs map was used to guide subsequent stages of surgery, if necessary, and the above process was repeated until a tumor-free plane was achieved. Once the tumor was cleared the map was marked as such and signed. Dr. Delsa Dakins acted as surgeon and pathologist for the entire case, performing all stages of the surgical excision as well as examination and interpretation of the histologic slides. See table below for details regarding the surgical case. 1 stage(s) were required to reach a tumor-free plane, resulting in a 1.0 x 0.9 cm defect extending to the subcutaneous tissue. There were not complications. The decision was made to harvest an autologous cartilage graft from the left antihelix in order to reinforce the alar rim on the left at the inferior portion of the surgical defect. The left antihelix was prepped and anesthetized in standard fashion. A #15 blade was used to sharply incise the skin and a skin flap was reflected away from the cartilage. A 1.5 cm long cartilage strut was harvested and placed in sterile saline. Hemostasis was achieved using electrosurgery. 5-0 fast-absorbing gut was used to suture the skin flap back into place and 2 placed quilting sutures. A #15 blade was used to make skin pockets in the anterior and posterior alar soft tissue and the cartilage strut was inserted into the pockets. A 4-0 Vicryl was used to fasten the cartilage strut in place.     A template of the planned left alar defect was transferred onto the left medial cheek, just lateral to the melolabial fold. The flap was incised to the subcutis leaving a cutaneous pedicle proximally. The paddle of the flap was elevated in the superficial subcutaneous tissue after which a pedicle was developed in the deep subcutaneous tissue including perforators and small fibers from the lip elevators. The flap was trimmed to fit the defect under minimal tension. A tacking suture was used to attach the undersurface of the flap to the deep portion of the alar defect. The periphery of the flap was sutured with 5-0 fast-absorbing gut and 5-0 Monocryl epidermal sutures. The donor site on the medial cheek was sutured with 5-0 Monocryl buried vertical mattress sutures and 5-0 Monocryl epidermal sutures. A pressure dressing utilizing Petrolatum ointment, Telfa, gauze and Coverroll was placed to be left in place for 2 days. Wound care instructions (written and/or verbal) and a follow up appointment were given to the patient before discharge, including my cell phone number. The patient was discharged in good condition and will return in 1 week for suture removal.                  2. 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 The diagnosis was reviewed. The Mohs surgery procedure was reviewed. Indications, risks, and options were discussed with Ms. Mcallisterrow preoperatively. Risks including, but not limited to: pain, bleeding, infection, tumor recurrence, scarring and damage to motor and/or sensory nerves, were discussed. Ms. Tru Lowry chose Mohs surgery. Ms. Tru Lowry was an acceptable surgery candidate. I performed Mohs surgery using standard technique after verbal and written consent were obtained. The lesion was identified and confirmed with the patient and photograph, if available. The surgical site was marked with gentian violet, prepped, draped and anesthetized in standard fashion.   The tumor was debulked by curettage and orientation hashes were placed. The tumor and any associated scar was excised using beveled incision. Hemostasis was achieved, the site was bandaged, and the tissue was transported to the Mohs lab. While maintaining anatomic orientation the tissue was divided, if needed, and marked with colored inks that were noted on the corresponding Mohs map. The tissue was prepared by Mohs en-face technique for fresh frozen section analysis. The resulting slides were examined for residual tumor, scar and other concerns, all of which were marked on the corresponding Mohs map, if present. The Mohs map was used to guide subsequent stages of surgery, if necessary, and the above process was repeated until a tumor-free plane was achieved. Once the tumor was cleared the map was marked as such and signed. Dr. Jose L Foreman acted as surgeon and pathologist for the entire case, performing all stages of the surgical excision as well as examination and interpretation of the histologic slides. See table below for details regarding the surgical case. 1 stage(s) were required to reach a tumor-free plane, resulting in a 1.0 x 0.9 cm defect extending to the subcutaneous tissue. There were not complications. Gabriela Villanueva will follow up as needed in the postoperative period. Regular skin examinations will be with  [facility]. This defect was repaired as part of the cheek interpolation flap from above.             Left nasal ala  Mohs Lesion Operative Report  Date: 01/23/20  Room: [facility]  Indications: Poor definition, Site, Size  Pre-op Meds: n/a  Pre-op BP: 138/70  Pre-op pulse: 64  1st Assistant: Raj Aldridge RN and Dylan Spencer LPN  Stage #: 1  Stage 1 Sections: 1  Stage 1 # Pos: 0  Perineural Involvement: No  Lymphadenopathy: No  Defect Size: 1.0 x 0.9 cm  Depth: subcutaneous tissue  Wound Mgt: interpolated flap  Donor Site: n/a  Suture: Buried, Surface  Buried details: 5-0 monocryl  Surface Details: 4-0 vicryl  Undermining: SubQ  Closure Length: n/a  Estimated Blood Loss: 2 mL  Hemostasis: Electrosurgery, Pressure  Anesthesia: 1% Lidocaine w/1:100,000 epi  1% Lidocaine: 9 cc  Complications: n/a  Dressing: vaseline, telfa, gauze, medipore tape  Post-op BP: 140/78  Post-op Pulse: 90  Pos-op Meds: Keflex 500 mg, 1 cap PO TID x 7 days  W/C Instructions: Verbal, Written  Follow-up: 1 week for bandage removal and wound check Left nasal ala  Mohs Lesion Operative Report  Date: 01/23/20  Room: [facility]  Indications: Poor definition, Site, Size  Pre-op Meds: n/a  Pre-op BP: 138/70  Pre-op pulse: 64  1st Assistant: Shaista Frederick RN and Lyubov Goyal LPN  Stage #: 1  Stage 1 Sections: 1  Stage 1 # Pos: 0  Perineural Involvement: No  Lymphadenopathy: No  Defect Size: 1.0 x 0.9 cm  Depth: subcutaneous tissue  Wound Mgt: interpolated flap  Donor Site: n/a  Suture: Buried, Surface  Buried details: 5-0 monocryl  Surface Details: 4-0 vicryl  Undermining: SubQ  Closure Length: n/a  Estimated Blood Loss: 2 mL  Hemostasis: Electrosurgery, Pressure  Anesthesia: 1% Lidocaine w/1:100,000 epi  1% Lidocaine: 9 cc  Complications: n/a  Dressing: vaseline, telfa, gauze, medipore tape  Post-op BP: 140/78  Post-op Pulse: 90  Pos-op Meds: Keflex 500 mg, 1 cap PO TID x 7 days  W/C Instructions: Verbal, Written  Follow-up: 1 week for bandage removal and wound check     [facility]   OFFICE PROCEDURE PROGRESS NOTE   Chart reviewed for the following:   Julisa Landeros MD have reviewed the History, Physical and updated the Allergic reactions for Courtney Varela.     TIME OUT performed immediately prior to start of procedure:   Julisa Landeros MD, have performed the following reviews on Lupis Yuan   prior to the start of the procedure:     * Patient was identified by name and date of birth   * Agreement on procedure being performed was verified   * Risks and Benefits explained to the patient   * Procedure site verified and marked as necessary   * Patient was positioned for comfort   * Consent was signed and verified     Time: 9 AM  Date of procedure: 1/23/2020  Procedure performed by:  Toro Smith MD  Provider assisted by: Christiano Tristan RN and Sharon Fofana LPN  Patient assisted by: self   How tolerated by patient: tolerated the procedure well with no complications   Comments: none

## 2020-01-23 NOTE — PATIENT INSTRUCTIONS
WOUND CARE INSTRUCTIONS 1. Keep the dressing clean and dry and do not remove for 1 week. 2. Watch for: BLEEDING: A small amount of drainage may occur. If bleeding occurs, elevate and rest the surgery site. Apply gauze and steady pressure for 20 minutes to the cheek. If bleeding continues repeat pressure once more. If bleeding still does not stop, call this office. If after hours, call Dr. Delsa Dakins at [phone]. INFECTION: Signs of infection include increased redness, pain, warmth, drainage of pus, and fever. If this occurs, please call this office. 3. Special Instructions (follow any that are checked): 
· [x] You have stitches that DO need to be removed. · [x] Avoid bending at the waist and heavy lifting for two days. · [x] Sleep with your head elevated for the next two nights. · [] Rest the surgery site and keep it elevated as much as possible for two days. · [] You may apply an ice-pack for 10-15 minutes every waking hour for the rest of the day. · [] Eat a soft diet and avoid hot food and hot drinks for the rest of the day. · [] Other instructions: Follow up as directed. Take Tylenol  for pain as needed. Once the site is healed with no remaining bandages or open areas, protect your surgical site and scar from the sun, as this area will be more sensitive. Use a broad spectrum sunscreen SPF 30 or higher daily, and a chemical free product (one containing zinc oxide or titanium dioxide) is a good choice if the area is sensitive. You may begin to gently massage the surgical site in 3 weeks, rubbing in a circular motion along the scar. This can help reduce swelling and thickness of a scar. If you have any questions or concerns, please call our office Monday through Friday at [phone]. If after hours, please call Dr. Delsa Dakins at [phone].

## 2020-01-30 ENCOUNTER — OFFICE VISIT (OUTPATIENT)
Dept: DERMATOLOGY | Facility: AMBULATORY SURGERY CENTER | Age: 85
End: 2020-01-30

## 2020-01-30 DIAGNOSIS — C44.311 BASAL CELL CARCINOMA (BCC) OF LEFT ALA NASI: Primary | ICD-10-CM

## 2020-01-30 DIAGNOSIS — C44.319 BASAL CELL CARCINOMA (BCC) OF LEFT FOREHEAD: ICD-10-CM

## 2020-01-30 NOTE — PROGRESS NOTES
Wound check/suture removal:    Chief complaint: wound check. HPI: Bill Yuan presents for wound check following Mohs surgery to treat a biopsy-proven basal cell carcinoma on left alar groove and the left medial cheek repaired with an interpolated flap performed 1 week ago. Exam: The surgical site was examined. There is not evidence of infection. There is erythema. There is edema. A/P:  Wound check. The surgical site is healing well. Additional care was reviewed including liberal application of Vaseline several times daily. Follow up will be in 2 weeks for division and inset. The forehead wound with hyper granulation tissue was anesthetized today and shaved flat followed by cautery.

## 2020-02-03 ENCOUNTER — OFFICE VISIT (OUTPATIENT)
Dept: DERMATOLOGY | Facility: AMBULATORY SURGERY CENTER | Age: 85
End: 2020-02-03

## 2020-02-03 DIAGNOSIS — C44.311 BASAL CELL CARCINOMA (BCC) OF LEFT ALA NASI: Primary | ICD-10-CM

## 2020-02-03 NOTE — PROGRESS NOTES
Wound check/suture removal:    Chief complaint: wound check. HPI: Doug Yuan presents for wound check following for stage of melolabial interpolated flap to reconstruct and alar defect performed about a week and half ago. She called last night complaining of some bleeding that began 2 days ago and was relatively small in volume. Exam: The surgical site was examined. There is not evidence of infection. There is erythema. There is not edema. There is a small amount of crusted blood in the Xeroform wrapping the pedicle. A/P:  Wound check. The surgical site is healing well. Additional care was reviewed including liberal application of Vaseline several times daily and gentle scar massage starting at 3 weeks postop. Follow up will be in 2 weeks for division and inset.

## 2020-02-12 ENCOUNTER — TELEPHONE (OUTPATIENT)
Dept: DERMATOLOGY | Facility: AMBULATORY SURGERY CENTER | Age: 85
End: 2020-02-12

## 2020-02-12 NOTE — TELEPHONE ENCOUNTER
Made phone call to patient, verified patient with two identifiers, name and date of birth. Patient stated she had some bleeding from surgical site. It is now controlled and no issues. Informed her we will see her this coming Friday 2/14/20 for her division surgery. Patient verbalized understanding and had no further questions.

## 2020-02-12 NOTE — TELEPHONE ENCOUNTER
Pt would like to speak with a nurse about her surgery site.  She can be best reached at [phone]

## 2020-02-14 ENCOUNTER — OFFICE VISIT (OUTPATIENT)
Dept: DERMATOLOGY | Facility: AMBULATORY SURGERY CENTER | Age: 85
End: 2020-02-14

## 2020-02-14 VITALS
RESPIRATION RATE: 16 BRPM | HEART RATE: 73 BPM | OXYGEN SATURATION: 99 % | HEIGHT: 64 IN | DIASTOLIC BLOOD PRESSURE: 70 MMHG | SYSTOLIC BLOOD PRESSURE: 140 MMHG | BODY MASS INDEX: 29.71 KG/M2 | WEIGHT: 174 LBS

## 2020-02-14 DIAGNOSIS — T14.8XXA SKIN WOUND FROM SURGICAL INCISION: ICD-10-CM

## 2020-02-14 DIAGNOSIS — C44.311 BASAL CELL CARCINOMA (BCC) OF LEFT ALA NASI: Primary | ICD-10-CM

## 2020-02-14 NOTE — PATIENT INSTRUCTIONS

## 2020-02-20 ENCOUNTER — OFFICE VISIT (OUTPATIENT)
Dept: DERMATOLOGY | Facility: AMBULATORY SURGERY CENTER | Age: 85
End: 2020-02-20

## 2020-02-20 DIAGNOSIS — C44.311 BASAL CELL CARCINOMA (BCC) OF LEFT ALA NASI: Primary | ICD-10-CM

## 2020-02-20 NOTE — PATIENT INSTRUCTIONS
Chief Complaint   Patient presents with    Surgical Follow-up     Post Mohs wound check     WOUND CARE INSTRUCTIONS - NOSE    1. Change the dressing once a day as follows:  a. Wash hands before and after each dressing change. b. Remove dressing and wash site gently with mild soap and water, rinse, and pat dry.  c. Apply liberal amounts of an ointment (Petroleum jelly or Aquaphor). d. Apply a non-stick (Telfa) dressing or Band-Aid to cover the wound. 2. Watch for:  INFECTION: Signs of infection include increased redness, pain, warmth, drainage of pus, and fever. If this occurs, please call this office. Follow up as directed - 2 weeks    Take Tylenol for pain as needed. If you have any questions or concerns, please call our office Monday through Friday at [phone]. If after hours, please call Dr. Vignesh Hennessy at [phone].

## 2020-02-20 NOTE — PROGRESS NOTES
Wound check/suture removal:    Chief complaint: wound check. HPI: Dusty Yuan presents for wound check following division and inset of a melolabial interpolated flap used to reconstruct a left alar defect performed 1 week ago. Exam: The surgical site was examined. There is not evidence of infection. There is erythema. There is not edema. A/P:  Wound check and suture removal today. The surgical site is healing well. Additional care was reviewed including liberal application of Vaseline several times daily and gentle scar massage starting at 4-6 weeks postop. Follow up will be in 2 weeks.

## 2020-03-05 ENCOUNTER — OFFICE VISIT (OUTPATIENT)
Dept: DERMATOLOGY | Facility: AMBULATORY SURGERY CENTER | Age: 85
End: 2020-03-05

## 2020-03-05 DIAGNOSIS — C44.311 BASAL CELL CARCINOMA (BCC) OF LEFT ALA NASI: Primary | ICD-10-CM

## 2020-03-05 DIAGNOSIS — C44.319 BASAL CELL CARCINOMA (BCC) OF LEFT FOREHEAD: ICD-10-CM

## 2020-03-05 NOTE — PROGRESS NOTES
Wound check/suture removal:    Chief complaint: wound check. HPI: Usman Yuan presents for wound check following division and inset of medial labial interpolated flap to repair an alar defect performed 3 weeks ago. Her nose is healing well. Of note, she continues to have excessive granulation tissue on the left forehead at the site of a failed skin graft. Exam: The surgical site was examined. There is not evidence of infection. There is not erythema. There is not edema. There is hyper granulation tissue on the left forehead. A/P:  Wound check. The surgical site is healing well. Additional care was reviewed including liberal application of Vaseline several times daily and gentle scar massage starting at 4-6 weeks postop. Follow up will be in 2 weeks. The left forehead was anesthetized and excessive granulation tissue was shaved flat. Electrosurgery was used to achieve hemostasis.

## 2020-03-20 ENCOUNTER — OFFICE VISIT (OUTPATIENT)
Dept: DERMATOLOGY | Facility: AMBULATORY SURGERY CENTER | Age: 85
End: 2020-03-20

## 2020-03-20 ENCOUNTER — HOSPITAL ENCOUNTER (OUTPATIENT)
Dept: LAB | Age: 85
Discharge: HOME | End: 2020-03-20

## 2020-03-20 DIAGNOSIS — C44.311 BASAL CELL CARCINOMA (BCC) OF LEFT ALA NASI: ICD-10-CM

## 2020-03-20 DIAGNOSIS — D48.5 NEOPLASM OF UNCERTAIN BEHAVIOR OF SKIN OF NECK: Primary | ICD-10-CM

## 2020-03-20 RX ORDER — TIMOLOL MALEATE 5 MG/ML
SOLUTION/ DROPS OPHTHALMIC
Qty: 10 ML | Refills: 1 | Status: SHIPPED | OUTPATIENT
Start: 2020-03-20 | End: 2021-12-08

## 2020-03-20 NOTE — PROGRESS NOTES
Wound check/suture removal:    Chief complaint: wound check. HPI: Bailey Yuan presents for wound check following Mohs surgery to treat a biopsy-proven basal cell carcinoma on the left ala and nasal tip performed several months ago. At last visit the excessive granulation tissue on the patient's left forehead was curetted and electrodesiccated. She reports that after approximately 2 weeks it began to grow again. Additionally she has a lesion on the right neck that is been present in the past but came back and gets caught on her jewelry. Exam: The surgical site was examined. There is not evidence of infection. There is not erythema. There is not edema. On the right neck there is a pedunculated verrucous papule. A/P: Shave biopsy:    1. Neoplasm uncertain behavior right neck- rule out I SK versus VV versus skin tag. A shave biopsy was advised to address this lesion. The procedure was reviewed and verbal and written consents were obtained. The risks of pain, bleeding, infection, and scar were discussed. I performed the procedure. The site was cleansed and anesthetized with 1% lidocaine with epinephrine 1:100,000. A shave biopsy was performed to sample the lesion. Drysol was used for hemostasis. The wound was bandaged and care reviewed. The specimen was sent to pathology. I will contact the patient with the results and any further treatment that may be necessary. 2.  Wound check. The surgical site on the nose is healing well. The surgical site on the left forehead has hyper granulation tissue. Additional care was reviewed including discontinuing Vaseline and applying topical timolol twice daily. Follow up will be by telephone in 3 to 4 weeks.

## 2020-03-24 NOTE — PROGRESS NOTES
Please of the patient know that the results from her various biopsy have returned and show an irritated seborrheic keratosis. This is a benign diagnosis needs no further treatment. Thank you.

## 2021-01-09 ENCOUNTER — APPOINTMENT (OUTPATIENT)
Dept: CT IMAGING | Age: 86
End: 2021-01-09
Attending: INTERNAL MEDICINE
Payer: MEDICARE

## 2021-01-09 ENCOUNTER — APPOINTMENT (OUTPATIENT)
Dept: GENERAL RADIOLOGY | Age: 86
End: 2021-01-09
Attending: INTERNAL MEDICINE
Payer: MEDICARE

## 2021-01-09 ENCOUNTER — HOSPITAL ENCOUNTER (EMERGENCY)
Age: 86
Discharge: HOME | End: 2021-01-09
Payer: MEDICARE

## 2021-01-09 VITALS
DIASTOLIC BLOOD PRESSURE: 72 MMHG | OXYGEN SATURATION: 98 % | TEMPERATURE: 98 F | HEART RATE: 87 BPM | WEIGHT: 174 LBS | RESPIRATION RATE: 17 BRPM | HEIGHT: 64 IN | BODY MASS INDEX: 29.71 KG/M2 | SYSTOLIC BLOOD PRESSURE: 135 MMHG

## 2021-01-09 DIAGNOSIS — S42.255A NONDISPLACED FRACTURE OF GREATER TUBEROSITY OF LEFT HUMERUS, INITIAL ENCOUNTER FOR CLOSED FRACTURE: Primary | ICD-10-CM
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
## 2021-01-09 DIAGNOSIS — S00.03XA CONTUSION OF SCALP, INITIAL ENCOUNTER: ICD-10-CM

## 2021-01-09 LAB
ALBUMIN SERPL-MCNC: 3.3 G/DL (ref 3.5–5)
ALBUMIN/GLOB SERPL: 0.7 {RATIO} (ref 1.1–2.2)
ALP SERPL-CCNC: 120 U/L (ref 45–117)
ALT SERPL-CCNC: 27 U/L (ref 12–78)
ANION GAP SERPL CALC-SCNC: 7 MMOL/L (ref 5–15)
APTT PPP: 32.9 SEC (ref 23–35.7)
AST SERPL W P-5'-P-CCNC: 34 U/L (ref 15–37)
BASOPHILS # BLD: 0 K/UL (ref 0–0.1)
BASOPHILS NFR BLD: 0 % (ref 0–1)
BILIRUB SERPL-MCNC: 0.6 MG/DL (ref 0.2–1)
BUN SERPL-MCNC: 14 MG/DL (ref 6–20)
BUN/CREAT SERPL: 16 (ref 12–20)
CA-I BLD-MCNC: 8.5 MG/DL (ref 8.5–10.1)
CHLORIDE SERPL-SCNC: 100 MMOL/L (ref 97–108)
CO2 SERPL-SCNC: 29 MMOL/L (ref 21–32)
CREAT SERPL-MCNC: 0.85 MG/DL (ref 0.55–1.02)
DIFFERENTIAL METHOD BLD: ABNORMAL
EOSINOPHIL # BLD: 0 K/UL (ref 0–0.4)
EOSINOPHIL NFR BLD: 0 % (ref 0–7)
ERYTHROCYTE [DISTWIDTH] IN BLOOD BY AUTOMATED COUNT: 13.7 % (ref 11.5–14.5)
GLOBULIN SER CALC-MCNC: 4.6 G/DL (ref 2–4)
GLUCOSE SERPL-MCNC: 118 MG/DL (ref 65–100)
HCT VFR BLD AUTO: 38.2 % (ref 35–47)
HGB BLD-MCNC: 12.9 G/DL (ref 11.5–16)
IMM GRANULOCYTES # BLD AUTO: 0 K/UL (ref 0–0.04)
IMM GRANULOCYTES NFR BLD AUTO: 0 % (ref 0–0.5)
INR PPP: 1 (ref 0.9–1.1)
LYMPHOCYTES # BLD: 5.6 K/UL (ref 0.8–3.5)
LYMPHOCYTES NFR BLD: 76 % (ref 12–49)
MCH RBC QN AUTO: 30.2 PG (ref 26–34)
MCHC RBC AUTO-ENTMCNC: 33.8 G/DL (ref 30–36.5)
MCV RBC AUTO: 89.5 FL (ref 80–99)
MONOCYTES # BLD: 0.6 K/UL (ref 0–1)
MONOCYTES NFR BLD: 8 % (ref 5–13)
NEUTS SEG # BLD: 1.2 K/UL (ref 1.8–8)
NEUTS SEG NFR BLD: 16 % (ref 32–75)
NRBC # BLD: 0 K/UL (ref 0–0.01)
NRBC BLD-RTO: 0 PER 100 WBC
PLATELET # BLD AUTO: 219 K/UL (ref 150–400)
PMV BLD AUTO: 10.6 FL (ref 8.9–12.9)
POTASSIUM SERPL-SCNC: 3.4 MMOL/L (ref 3.5–5.1)
PROT SERPL-MCNC: 7.9 G/DL (ref 6.4–8.2)
PROTHROMBIN TIME: 13.2 SEC (ref 11.9–14.7)
RBC # BLD AUTO: 4.27 M/UL (ref 3.8–5.2)
SODIUM SERPL-SCNC: 136 MMOL/L (ref 136–145)
THERAPEUTIC RANGE,PTTT: NORMAL SEC (ref 68–109)
WBC # BLD AUTO: 7.4 K/UL (ref 3.6–11)

## 2021-01-09 PROCEDURE — 85730 THROMBOPLASTIN TIME PARTIAL: CPT

## 2021-01-09 PROCEDURE — 72125 CT NECK SPINE W/O DYE: CPT

## 2021-01-09 PROCEDURE — 73030 X-RAY EXAM OF SHOULDER: CPT

## 2021-01-09 PROCEDURE — 96374 THER/PROPH/DIAG INJ IV PUSH: CPT

## 2021-01-09 PROCEDURE — 85025 COMPLETE CBC W/AUTO DIFF WBC: CPT

## 2021-01-09 PROCEDURE — 80053 COMPREHEN METABOLIC PANEL: CPT

## 2021-01-09 PROCEDURE — 96375 TX/PRO/DX INJ NEW DRUG ADDON: CPT

## 2021-01-09 PROCEDURE — 74011250636 HC RX REV CODE- 250/636: Performed by: INTERNAL MEDICINE

## 2021-01-09 PROCEDURE — 70450 CT HEAD/BRAIN W/O DYE: CPT

## 2021-01-09 PROCEDURE — 73060 X-RAY EXAM OF HUMERUS: CPT

## 2021-01-09 PROCEDURE — 36415 COLL VENOUS BLD VENIPUNCTURE: CPT

## 2021-01-09 PROCEDURE — 74011250637 HC RX REV CODE- 250/637: Performed by: NURSE PRACTITIONER

## 2021-01-09 PROCEDURE — 85610 PROTHROMBIN TIME: CPT

## 2021-01-09 PROCEDURE — 99284 EMERGENCY DEPT VISIT MOD MDM: CPT

## 2021-01-09 RX ORDER — ONDANSETRON 2 MG/ML
4 INJECTION INTRAMUSCULAR; INTRAVENOUS
Status: COMPLETED | OUTPATIENT
Start: 2021-01-09 | End: 2021-01-09

## 2021-01-09 RX ORDER — OXYCODONE AND ACETAMINOPHEN 5; 325 MG/1; MG/1
1 TABLET ORAL
Status: COMPLETED | OUTPATIENT
Start: 2021-01-09 | End: 2021-01-09

## 2021-01-09 RX ORDER — MORPHINE SULFATE 4 MG/ML
4 INJECTION INTRAVENOUS ONCE
Status: COMPLETED | OUTPATIENT
Start: 2021-01-09 | End: 2021-01-09

## 2021-01-09 RX ORDER — ONDANSETRON 4 MG/1
4 TABLET, ORALLY DISINTEGRATING ORAL
Qty: 10 TAB | Refills: 0 | Status: SHIPPED | OUTPATIENT
Start: 2021-01-09 | End: 2021-12-08

## 2021-01-09 RX ORDER — HYDROCODONE BITARTRATE AND ACETAMINOPHEN 5; 325 MG/1; MG/1
1 TABLET ORAL
Qty: 10 TAB | Refills: 0 | Status: SHIPPED | OUTPATIENT
Start: 2021-01-09 | End: 2021-01-12

## 2021-01-09 RX ADMIN — MORPHINE SULFATE 4 MG: 4 INJECTION INTRAVENOUS at 17:38

## 2021-01-09 RX ADMIN — ONDANSETRON 4 MG: 2 INJECTION INTRAMUSCULAR; INTRAVENOUS at 17:38

## 2021-01-09 RX ADMIN — OXYCODONE AND ACETAMINOPHEN 1 TABLET: 5; 325 TABLET ORAL at 22:06

## 2021-01-09 NOTE — ED TRIAGE NOTES
Fall off curb while shopping at [employer], struck head on pavement, complaining of severe lt shoulder pain radiating into the neck.

## 2021-01-10 NOTE — ED PROVIDER NOTES
EMERGENCY DEPARTMENT HISTORY AND PHYSICAL EXAM      Date: 1/9/2021  Patient Name: Any Yuan      History of Presenting Illness     Chief Complaint   Patient presents with    Shoulder Injury    Head Injury    Fall       History Provided By: Patient    HPI: Any Yuan, 80 y.o. female with a past medical history significant hypertension, hyperlipidemia and cancer presents to the ED with cc of tripped and fell off curbing today. Pt reports left arm and left shoulder pain, +head contusion. -loc. Pt with limited rom of left arm due to pain. No swelling seen. +2 pulse present upon assessment. Denies dizziness or chest pain that precipitated fall. There are no other complaints, changes, or physical findings at this time. PCP: Unknown, Provider    Current Facility-Administered Medications   Medication Dose Route Frequency Provider Last Rate Last Admin    oxyCODONE-acetaminophen (PERCOCET) 5-325 mg per tablet 1 Tab  1 Tab Oral NOW Ricardo Zavala NP         Current Outpatient Medications   Medication Sig Dispense Refill    HYDROcodone-acetaminophen (Lorcet, HYDROcodone,) 5-325 mg per tablet Take 1 Tab by mouth every six (6) hours as needed for Pain for up to 3 days. Max Daily Amount: 4 Tabs. 10 Tab 0    ondansetron (ZOFRAN ODT) 4 mg disintegrating tablet Take 1 Tab by mouth every eight (8) hours as needed for Nausea or Vomiting. 10 Tab 0    timolol (TIMOPTIC) 0.5 % ophthalmic solution Apply topically to wound twice daily until healed.  10 mL 1    erythromycin (ILOTYCIN) ophthalmic ointment APPLY A THIN LAYER INTO RIGHT EYE AT BEDTIME      amLODIPine (NORVASC) 5 mg tablet TAKE 1 TABLET BY MOUTH EVERY DAY      trimethoprim-polymyxin b (POLYTRIM) ophthalmic solution INSTILL 1 DROP INTO AFFECTED EYE EVERY 6 HOURS      timolol (TIMOPTIC) 0.5 % ophthalmic solution Apply to surgical wound twice daily until healed 10 mL 1    cetirizine (ZYRTEC) 10 mg tablet TAKE 1 TABLET BY MOUTH EVERY DAY  11  DENTA 5000 PLUS 1.1 % crea BRUSH TEETH WITH PASTE 1-2 TIMES PER DAY, EXPECTORATE WELL. DO NOT EAT, DRINK, RINSE AFTER 30 MINS. 99    fluticasone propionate (FLONASE) 50 mcg/actuation nasal spray USE 1 SPRAY INTO EACH NOSTRIL EVERY DAY  11    telmisartan (MICARDIS) 40 mg tablet TAKE 1 TABLET BY MOUTH EVERY DAY  5    albuterol (PROVENTIL HFA, VENTOLIN HFA, PROAIR HFA) 90 mcg/actuation inhaler Take 2 Puffs by inhalation every four (4) hours as needed for Wheezing. 1 Inhaler 1    chlorpheniramine-HYDROcodone (TUSSIONEX PENNKINETIC ER) 10-8 mg/5 mL suspension Take 5 mL by mouth every twelve (12) hours as needed for Cough. Max Daily Amount: 10 mL. 60 mL 0    valsartan (DIOVAN) 40 mg tablet Take 1 Tab by mouth daily. 30 Tab 0    potassium chloride SR (K-TAB) 20 mEq tablet Take 1 Tab by mouth daily. 5 Tab 0    pravastatin (PRAVACHOL) 40 mg tablet Take 40 mg by mouth nightly.  furosemide (LASIX) 40 mg tablet Take 40 mg by mouth daily as needed for Other (swelling).  celecoxib (CELEBREX) 200 mg capsule Take 200 mg by mouth daily as needed for Pain.  cefUROXime (CEFTIN) 250 mg tablet Take 250 mg by mouth two (2) times a day.  aspirin delayed-release 81 mg tablet Take 81 mg by mouth daily. Past History     Past Medical History:  Past Medical History:   Diagnosis Date    Hyperlipidemia     Hypertension     Skin cancer     Uterine cancer ([facility] [address] 1998       Past Surgical History:  Past Surgical History:   Procedure Laterality Date    HX HYSTERECTOMY         Family History:  Family History   Problem Relation Age of Onset    Tuberculosis Mother     Tuberculosis Father        Social History:  Social History     Tobacco Use    Smoking status: Never Smoker    Smokeless tobacco: Never Used   Substance Use Topics    Alcohol use: No    Drug use: No       Allergies:   Allergies   Allergen Reactions    Seafood Hives     shellfish    Sulfa (Sulfonamide Antibiotics) Other (comments)     Isn't able to recall reaction at this time. Review of Systems     Review of Systems   Constitutional: Negative. HENT: Negative. Respiratory: Negative. Cardiovascular: Negative. Gastrointestinal: Negative. Genitourinary: Negative. Musculoskeletal: Negative. Left arm and shoulder pain. .    Skin: Negative. Neurological: Negative. Negative for dizziness and headaches. Physical Exam     Physical Exam  Vitals signs and nursing note reviewed. Constitutional:       General: She is not in acute distress. Appearance: Normal appearance. She is normal weight. HENT:      Head: Normocephalic and atraumatic. Eyes:      Extraocular Movements: Extraocular movements intact. Pupils: Pupils are equal, round, and reactive to light. Neck:      Musculoskeletal: No muscular tenderness. Cardiovascular:      Rate and Rhythm: Normal rate and regular rhythm. Pulses: Normal pulses. Heart sounds: Normal heart sounds. Pulmonary:      Effort: Respiratory distress present. Breath sounds: Normal breath sounds. Abdominal:      General: Abdomen is flat. Musculoskeletal: Normal range of motion. General: Swelling, tenderness (left humerus. ) and signs of injury present. No deformity. Skin:     General: Skin is warm and dry. Findings: Abrasion present. Neurological:      General: No focal deficit present. Mental Status: She is alert and oriented to person, place, and time. GCS: GCS eye subscore is 4. GCS verbal subscore is 5. GCS motor subscore is 6. Cranial Nerves: Cranial nerves are intact. Motor: Motor function is intact. Coordination: Coordination is intact.    Psychiatric:         Mood and Affect: Mood normal.         Behavior: Behavior normal.         Lab and Diagnostic Study Results     Labs -     Recent Results (from the past 12 hour(s))   CBC WITH AUTOMATED DIFF    Collection Time: 01/09/21  5:10 PM   Result Value Ref Range    WBC 7.4 3.6 - 11.0 K/uL    RBC 4.27 3.80 - 5.20 M/uL    HGB 12.9 11.5 - 16.0 g/dL    HCT 38.2 35.0 - 47.0 %    MCV 89.5 80.0 - 99.0 FL    MCH 30.2 26.0 - 34.0 PG    MCHC 33.8 30.0 - 36.5 g/dL    RDW 13.7 11.5 - 14.5 %    PLATELET 625 180 - 770 K/uL    MPV 10.6 8.9 - 12.9 FL    NRBC 0.0 0  WBC    ABSOLUTE NRBC 0.00 0.00 - 0.01 K/uL    NEUTROPHILS 16 (L) 32 - 75 %    LYMPHOCYTES 76 (H) 12 - 49 %    MONOCYTES 8 5 - 13 %    EOSINOPHILS 0 0 - 7 %    BASOPHILS 0 0 - 1 %    IMMATURE GRANULOCYTES 0 0.0 - 0.5 %    ABS. NEUTROPHILS 1.2 (L) 1.8 - 8.0 K/UL    ABS. LYMPHOCYTES 5.6 (H) 0.8 - 3.5 K/UL    ABS. MONOCYTES 0.6 0.0 - 1.0 K/UL    ABS. EOSINOPHILS 0.0 0.0 - 0.4 K/UL    ABS. BASOPHILS 0.0 0.0 - 0.1 K/UL    ABS. IMM. GRANS. 0.0 0.00 - 0.04 K/UL    DF AUTOMATED     METABOLIC PANEL, COMPREHENSIVE    Collection Time: 01/09/21  5:10 PM   Result Value Ref Range    Sodium 136 136 - 145 mmol/L    Potassium 3.4 (L) 3.5 - 5.1 mmol/L    Chloride 100 97 - 108 mmol/L    CO2 29 21 - 32 mmol/L    Anion gap 7 5 - 15 mmol/L    Glucose 118 (H) 65 - 100 mg/dL    BUN 14 6 - 20 mg/dL    Creatinine 0.85 0.55 - 1.02 mg/dL    BUN/Creatinine ratio 16 12 - 20      GFR est AA >60 >60 ml/min/1.73m2    GFR est non-AA >60 >60 ml/min/1.73m2    Calcium 8.5 8.5 - 10.1 mg/dL    Bilirubin, total 0.6 0.2 - 1.0 mg/dL    AST (SGOT) 34 15 - 37 U/L    ALT (SGPT) 27 12 - 78 U/L    Alk.  phosphatase 120 (H) 45 - 117 U/L    Protein, total 7.9 6.4 - 8.2 g/dL    Albumin 3.3 (L) 3.5 - 5.0 g/dL    Globulin 4.6 (H) 2.0 - 4.0 g/dL    A-G Ratio 0.7 (L) 1.1 - 2.2     PROTHROMBIN TIME + INR    Collection Time: 01/09/21  5:10 PM   Result Value Ref Range    Prothrombin time 13.2 11.9 - 14.7 sec    INR 1.0 0.9 - 1.1     PTT    Collection Time: 01/09/21  5:10 PM   Result Value Ref Range    aPTT 32.9 23.0 - 35.7 sec    aPTT, therapeutic range   68 - 109 sec       Radiologic Studies -   [unfilled]  CT Results  (Last 48 hours)               01/09/21 9217  CT HEAD WO CONT Final result    Impression:  IMPRESSION: Negative for acute intracranial abnormality. Narrative:  Procedure:  Axial CT of the head was performed without contrast. Sagittal and   coronal reformatted images were rendered at the CT console and were provided for   interpretation. Findings:  The ventricles are of normal size, shape and position. No mass or   shift of midline. No hemorrhage or extra-axial fluid. The gray-white matter   differentiation is normal, without evidence of infarct. Atherosclerosis of the   vertebral arteries and internal carotid arteries at the siphon. The calvarium is   intact. 01/09/21 1846  CT SPINE CERV WO CONT Final result    Impression:  IMPRESSION:       Negative for acute fracture or dislocation. Moderate spondylosis. DJD C1-C2 anteriorly. Moderate severe disc space narrowing C3-C4 and C4-C5 and C5-C6 and C6-C7 and   C7-T1 with small anterior and posterior posterolateral osteophytes. Normal   imbrication of the articular pillars with mild scattered DJD. Spinous processes   intact. Dens is intact. No erosions or tumor. No prevertebral soft tissue   swelling or hematoma. Airway unremarkable. Narrative:  CT scan of the cervical spine unenhanced. CXR Results  (Last 48 hours)    None          Medical Decision Making and ED Course   - I am the first and primary provider for this patient AND AM THE PRIMARY PROVIDER OF RECORD. - I reviewed the vital signs, available nursing notes, past medical history, past surgical history, family history and social history. - Initial assessment performed. The patients presenting problems have been discussed, and the staff are in agreement with the care plan formulated and outlined with them. I have encouraged them to ask questions as they arise throughout their visit. Vital Signs-Reviewed the patient's vital signs.     Patient Vitals for the past 12 hrs:   Temp Pulse Resp BP SpO2   01/09/21 1657 98 °F (36.7 °C) 90 18 138/70 99 %         Records Reviewed: Nursing Notes        ED Course:              Provider Notes (Medical Decision Making):     MDM  Number of Diagnoses or Management Options  Contusion of scalp, initial encounter: new, needed workup  Fall, initial encounter: new, needed workup  Nondisplaced fracture of greater tuberosity of left humerus, initial encounter for closed fracture: new, needed workup     Amount and/or Complexity of Data Reviewed  Clinical lab tests: ordered and reviewed  Tests in the radiology section of CPT®: ordered and reviewed    Risk of Complications, Morbidity, and/or Mortality  Presenting problems: low  Diagnostic procedures: low  Management options: low    Patient Progress  Patient progress: stable             Consultations:       Consultations: - NONE        Procedures and Critical Care       Performed by: Isauro Rahman NP  PROCEDURES:shoulder immobilizer per nursing staff. Procedures                     Disposition     Disposition: DC- Adult Discharges: All of the diagnostic tests were reviewed and questions answered. Diagnosis, care plan and treatment options were discussed. The patient understands the instructions and will follow up as directed. The patients results have been reviewed with them. They have been counseled regarding their diagnosis. The patient verbally convey understanding and agreement of the signs, symptoms, diagnosis, treatment and prognosis and additionally agrees to follow up as recommended with their PCP in 24 - 48 hours. They also agree with the care-plan and convey that all of their questions have been answered.   I have also put together some discharge instructions for them that include: 1) educational information regarding their diagnosis, 2) how to care for their diagnosis at home, as well a 3) list of reasons why they would want to return to the ED prior to their follow-up appointment, should their condition change. Discharged    Remove if not discharged  DISCHARGE PLAN:  1. Current Discharge Medication List      CONTINUE these medications which have NOT CHANGED    Details   !! timolol (TIMOPTIC) 0.5 % ophthalmic solution Apply topically to wound twice daily until healed. Qty: 10 mL, Refills: 1    Associated Diagnoses: Basal cell carcinoma (BCC) of left ala nasi      erythromycin (ILOTYCIN) ophthalmic ointment APPLY A THIN LAYER INTO RIGHT EYE AT BEDTIME      amLODIPine (NORVASC) 5 mg tablet TAKE 1 TABLET BY MOUTH EVERY DAY      trimethoprim-polymyxin b (POLYTRIM) ophthalmic solution INSTILL 1 DROP INTO AFFECTED EYE EVERY 6 HOURS      !! timolol (TIMOPTIC) 0.5 % ophthalmic solution Apply to surgical wound twice daily until healed  Qty: 10 mL, Refills: 1    Associated Diagnoses: Basal cell carcinoma (BCC) of left forehead      cetirizine (ZYRTEC) 10 mg tablet TAKE 1 TABLET BY MOUTH EVERY DAY  Refills: 11      DENTA 5000 PLUS 1.1 % crea BRUSH TEETH WITH PASTE 1-2 TIMES PER DAY, EXPECTORATE WELL. DO NOT EAT, DRINK, RINSE AFTER 30 MINS. Refills: 99      fluticasone propionate (FLONASE) 50 mcg/actuation nasal spray USE 1 SPRAY INTO EACH NOSTRIL EVERY DAY  Refills: 11      telmisartan (MICARDIS) 40 mg tablet TAKE 1 TABLET BY MOUTH EVERY DAY  Refills: 5      albuterol (PROVENTIL HFA, VENTOLIN HFA, PROAIR HFA) 90 mcg/actuation inhaler Take 2 Puffs by inhalation every four (4) hours as needed for Wheezing. Qty: 1 Inhaler, Refills: 1    Associated Diagnoses: Acute bronchitis, unspecified organism      chlorpheniramine-HYDROcodone (TUSSIONEX PENNKINETIC ER) 10-8 mg/5 mL suspension Take 5 mL by mouth every twelve (12) hours as needed for Cough. Max Daily Amount: 10 mL. Qty: 60 mL, Refills: 0    Associated Diagnoses: Acute bronchitis, unspecified organism      valsartan (DIOVAN) 40 mg tablet Take 1 Tab by mouth daily. Qty: 30 Tab, Refills: 0      potassium chloride SR (K-TAB) 20 mEq tablet Take 1 Tab by mouth daily.   Qty: 5 Tab, Refills: 0      pravastatin (PRAVACHOL) 40 mg tablet Take 40 mg by mouth nightly. furosemide (LASIX) 40 mg tablet Take 40 mg by mouth daily as needed for Other (swelling). celecoxib (CELEBREX) 200 mg capsule Take 200 mg by mouth daily as needed for Pain. cefUROXime (CEFTIN) 250 mg tablet Take 250 mg by mouth two (2) times a day. aspirin delayed-release 81 mg tablet Take 81 mg by mouth daily. !! - Potential duplicate medications found. Please discuss with provider. 2.   Follow-up Information     Follow up With Specialties Details Why Roz Aguilar MD Orthopedic Surgery In 2 days  [facility] [address]  [phone]          3. Return to ED if worse   4. Current Discharge Medication List      START taking these medications    Details   HYDROcodone-acetaminophen (Lorcet, HYDROcodone,) 5-325 mg per tablet Take 1 Tab by mouth every six (6) hours as needed for Pain for up to 3 days. Max Daily Amount: 4 Tabs. Qty: 10 Tab, Refills: 0    Associated Diagnoses: Nondisplaced fracture of greater tuberosity of left humerus, initial encounter for closed fracture; Fall, initial encounter; Contusion of scalp, initial encounter      ondansetron (ZOFRAN ODT) 4 mg disintegrating tablet Take 1 Tab by mouth every eight (8) hours as needed for Nausea or Vomiting. Qty: 10 Tab, Refills: 0             Diagnosis     Clinical Impression:   1. Nondisplaced fracture of greater tuberosity of left humerus, initial encounter for closed fracture    2. Fall, initial encounter    3. Contusion of scalp, initial encounter        Attestations:    Arman Oppenheim, NP    Please note that this dictation was completed with Quri, the [employer] voice recognition software. Quite often unanticipated grammatical, syntax, homophones, and other interpretive errors are inadvertently transcribed by the computer software. Please disregard these errors. Please excuse any errors that have escaped final proofreading. Thank you.

## 2021-06-09 ENCOUNTER — APPOINTMENT (OUTPATIENT)
Dept: GENERAL RADIOLOGY | Age: 86
End: 2021-06-09
Attending: EMERGENCY MEDICINE
Payer: MEDICARE

## 2021-06-09 ENCOUNTER — HOSPITAL ENCOUNTER (EMERGENCY)
Age: 86
Discharge: HOME | End: 2021-06-09
Attending: EMERGENCY MEDICINE
Payer: MEDICARE

## 2021-06-09 VITALS
OXYGEN SATURATION: 96 % | HEART RATE: 94 BPM | RESPIRATION RATE: 16 BRPM | BODY MASS INDEX: 24.75 KG/M2 | HEIGHT: 64 IN | WEIGHT: 145 LBS | SYSTOLIC BLOOD PRESSURE: 125 MMHG | DIASTOLIC BLOOD PRESSURE: 77 MMHG | TEMPERATURE: 98.4 F

## 2021-06-09 DIAGNOSIS — S93.402A SPRAIN OF LEFT ANKLE, UNSPECIFIED LIGAMENT, INITIAL ENCOUNTER: ICD-10-CM
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
## 2021-06-09 DIAGNOSIS — S90.31XA CONTUSION OF RIGHT FOOT, INITIAL ENCOUNTER: ICD-10-CM

## 2021-06-09 PROCEDURE — 99282 EMERGENCY DEPT VISIT SF MDM: CPT

## 2021-06-09 PROCEDURE — 73610 X-RAY EXAM OF ANKLE: CPT

## 2021-06-09 PROCEDURE — 72170 X-RAY EXAM OF PELVIS: CPT

## 2021-06-09 PROCEDURE — 73630 X-RAY EXAM OF FOOT: CPT

## 2021-06-09 RX ORDER — APIXABAN 5 MG/1
TABLET, FILM COATED ORAL
COMMUNITY
Start: 2021-05-18

## 2021-06-09 RX ORDER — SPIRONOLACTONE 25 MG/1
TABLET ORAL
COMMUNITY
Start: 2021-05-18 | End: 2021-12-08

## 2021-06-09 RX ORDER — BUMETANIDE 1 MG/1
TABLET ORAL
COMMUNITY
Start: 2021-06-03 | End: 2021-12-08

## 2021-06-09 NOTE — ED TRIAGE NOTES
PT. ED WITH DAUGHTER VIA WC WITH C/O INJURY TO LEFT ANKLE FROM A FALL TODAY. PT. STATES, WAS DOING EXERCISES IN HOUSE AND FELL.       '

## 2021-06-09 NOTE — ED PROVIDER NOTES
EMERGENCY DEPARTMENT HISTORY AND PHYSICAL EXAM      Date: 6/9/2021  Patient Name: Fritzi Crigler Withrow    History of Presenting Illness     Chief Complaint   Patient presents with    Ankle Injury       History Provided By: Patient    HPI: Priscilla Payne, 80 y.o. female presents to the ED with cc of   Chief Complaint   Patient presents with    Ankle Injury     PT. ED WITH DAUGHTER VIA WC WITH C/O INJURY TO LEFT ANKLE FROM A FALL TODAY. PT. STATES, WAS DOING EXERCISES IN HOUSE AND FELL. Patient states that she tripped and fall complaining of left ankle pain right foot pain, denies any head injury denies any loss of consciousness   Moderate severity, no known exacerbating or relieving factors, no other associated signs and symptoms.         There are no other complaints, changes, or physical findings at this time. PCP: Unknown, Provider    No current facility-administered medications on file prior to encounter. Current Outpatient Medications on File Prior to Encounter   Medication Sig Dispense Refill    timolol (TIMOPTIC) 0.5 % ophthalmic solution Apply topically to wound twice daily until healed. 10 mL 1    timolol (TIMOPTIC) 0.5 % ophthalmic solution Apply to surgical wound twice daily until healed 10 mL 1    cetirizine (ZYRTEC) 10 mg tablet TAKE 1 TABLET BY MOUTH EVERY DAY  11    Eliquis 5 mg tablet       bumetanide (BUMEX) 1 mg tablet       spironolactone (ALDACTONE) 25 mg tablet       ondansetron (ZOFRAN ODT) 4 mg disintegrating tablet Take 1 Tab by mouth every eight (8) hours as needed for Nausea or Vomiting.  10 Tab 0    erythromycin (ILOTYCIN) ophthalmic ointment APPLY A THIN LAYER INTO RIGHT EYE AT BEDTIME      amLODIPine (NORVASC) 5 mg tablet TAKE 1 TABLET BY MOUTH EVERY DAY (Patient not taking: Reported on 6/9/2021)      trimethoprim-polymyxin b (POLYTRIM) ophthalmic solution INSTILL 1 DROP INTO AFFECTED EYE EVERY 6 HOURS      DENTA 5000 PLUS 1.1 % crea BRUSH TEETH WITH PASTE 1-2 TIMES PER DAY, EXPECTORATE WELL. DO NOT EAT, DRINK, RINSE AFTER 30 MINS. 99    fluticasone propionate (FLONASE) 50 mcg/actuation nasal spray USE 1 SPRAY INTO EACH NOSTRIL EVERY DAY  11    telmisartan (MICARDIS) 40 mg tablet TAKE 1 TABLET BY MOUTH EVERY DAY (Patient not taking: Reported on 6/9/2021)  5    albuterol (PROVENTIL HFA, VENTOLIN HFA, PROAIR HFA) 90 mcg/actuation inhaler Take 2 Puffs by inhalation every four (4) hours as needed for Wheezing. 1 Inhaler 1    chlorpheniramine-HYDROcodone (TUSSIONEX PENNKINETIC ER) 10-8 mg/5 mL suspension Take 5 mL by mouth every twelve (12) hours as needed for Cough. Max Daily Amount: 10 mL. 60 mL 0    valsartan (DIOVAN) 40 mg tablet Take 1 Tab by mouth daily. (Patient not taking: Reported on 6/9/2021) 30 Tab 0    potassium chloride SR (K-TAB) 20 mEq tablet Take 1 Tab by mouth daily. 5 Tab 0    pravastatin (PRAVACHOL) 40 mg tablet Take 40 mg by mouth nightly.  furosemide (LASIX) 40 mg tablet Take 40 mg by mouth daily as needed for Other (swelling). (Patient not taking: Reported on 6/9/2021)      celecoxib (CELEBREX) 200 mg capsule Take 200 mg by mouth daily as needed for Pain. (Patient not taking: Reported on 6/9/2021)      cefUROXime (CEFTIN) 250 mg tablet Take 250 mg by mouth two (2) times a day. (Patient not taking: Reported on 6/9/2021)      aspirin delayed-release 81 mg tablet Take 81 mg by mouth daily.  (Patient not taking: Reported on 6/9/2021)         Past History     Past Medical History:  Past Medical History:   Diagnosis Date    CAD (coronary artery disease)     ABLATION    Hyperlipidemia     Hypertension     Skin cancer     Uterine cancer ([facility] [address] 1998       Past Surgical History:  Past Surgical History:   Procedure Laterality Date    HX HYSTERECTOMY      HX PACEMAKER         Family History:  Family History   Problem Relation Age of Onset    Tuberculosis Mother     Tuberculosis Father        Social History:  Social History     Tobacco Use    Smoking status: Never Smoker    Smokeless tobacco: Never Used   Substance Use Topics    Alcohol use: No    Drug use: No       Allergies: Allergies   Allergen Reactions    Seafood Hives     shellfish    Sulfa (Sulfonamide Antibiotics) Other (comments)     Isn't able to recall reaction at this time. Review of Systems   Review of Systems   Constitutional: Negative. HENT: Negative for congestion, facial swelling, rhinorrhea and sore throat. Eyes: Negative. Negative for photophobia and pain. Respiratory: Negative for cough, shortness of breath and wheezing. Cardiovascular: Negative. Negative for chest pain. Gastrointestinal: Negative for abdominal distention and abdominal pain. Genitourinary: Negative. Musculoskeletal: Positive for arthralgias, gait problem and joint swelling. Negative for back pain and neck pain. Allergic/Immunologic: Negative for immunocompromised state. Neurological: Negative for dizziness, syncope, weakness and headaches. Hematological: Negative. Psychiatric/Behavioral: Negative. Physical Exam   Physical Exam  Vitals and nursing note reviewed. Constitutional:       Appearance: Normal appearance. She is normal weight. HENT:      Head: Normocephalic and atraumatic. Jaw: There is normal jaw occlusion. Right Ear: Tympanic membrane and external ear normal.      Left Ear: Tympanic membrane and external ear normal.      Nose: Nose normal.      Mouth/Throat:      Mouth: Mucous membranes are moist.      Pharynx: Oropharynx is clear. Eyes:      General: Lids are normal.         Right eye: No foreign body or hordeolum. Left eye: No foreign body or hordeolum. Neck:      Thyroid: No thyroid mass. Vascular: No carotid bruit. Trachea: No tracheal tenderness. Cardiovascular:      Rate and Rhythm: Normal rate and regular rhythm. Pulses: Normal pulses. Heart sounds: Normal heart sounds.    Pulmonary:      Effort: Pulmonary effort is normal.      Breath sounds: Normal breath sounds. Abdominal:      General: Abdomen is flat. Bowel sounds are normal.      Palpations: Abdomen is soft. There is no mass. Tenderness: There is no abdominal tenderness. There is no right CVA tenderness, left CVA tenderness, guarding or rebound. Hernia: No hernia is present. Musculoskeletal:         General: Normal range of motion. Cervical back: Normal range of motion and neck supple. No rigidity. No muscular tenderness. Normal range of motion. Feet:    Feet:      Comments: Swelling of left ankle on the lateral aspect and some tenderness 3rd-5th toe on the right foot  No other injuries noted  Skin:     General: Skin is warm. Findings: No lesion or rash. Neurological:      General: No focal deficit present. Mental Status: She is alert and oriented to person, place, and time. GCS: GCS eye subscore is 4. GCS verbal subscore is 5. GCS motor subscore is 6. Sensory: Sensation is intact. Motor: Motor function is intact. Deep Tendon Reflexes: Reflexes are normal and symmetric. Psychiatric:         Attention and Perception: Attention and perception normal.         Mood and Affect: Mood is anxious. Diagnostic Study Results     Labs -   No results found for this or any previous visit (from the past 12 hour(s)). Labs reviewed by me    Radiologic Studies -   XR ANKLE LT MIN 3 V   Final Result   No acute osseous abnormality definitively identified. XR FOOT RT MIN 3 V   Final Result   No acute osseous abnormality definitively identified. XR PELV AP ONLY   Final Result   No acute osseous abnormality definitively identified. CT Results  (Last 48 hours)    None        CXR Results  (Last 48 hours)    None            Medical Decision Making     I am the first provider for this patient.     I reviewed the vital signs, available nursing notes, past medical history, past surgical history, family history and social history. RADIOLOGY report and LABS reviewed by me    Vital Signs-Reviewed the patient's vital signs. Patient Vitals for the past 12 hrs:   Temp Pulse Resp BP SpO2   06/09/21 1441 98.4 °F (36.9 °C) 94 16 125/77 96 %       EKG interpretation: (Preliminary)      Records Reviewed: Nurse's note. Provider Notes (Medical Decision Making):    Patient presents with DIFF DX : Fall left ankle fracture, left ankle sprain, contusion foot        ED Course:   Initial assessment performed. The patients presenting problems have been discussed, and they are in agreement with the care plan formulated and outlined with them. I have encouraged them to ask questions as they arise throughout their visit. TREATMENT RESPONSE -Stable          Steve Cobb MD      Disposition:  Discharged   Diagnostic tests were reviewed and questions answered. Diagnosis, care plan and treatment options were discussed. The patient understand instructions and will follow up as directed. Condition stable    Admitting Provider:  No admitting provider for patient encounter. Consulting Provider:  No ref. provider found       DISCHARGE PLAN:  1. Current Discharge Medication List        2. Follow-up Information     Follow up With Specialties Details Why Contact Info    Unknown, Provider    Patient not available to ask      Debi Courtney MD Orthopedic Surgery In 2 days  [place] [address]  [phone]          3. Return to ED if worse     Diagnosis     Clinical Impression:     ICD-10-CM ICD-9-CM    1. Fall, initial encounter  [address] XXXA E888.9    2. Sprain of left ankle, unspecified ligament, initial encounter  S93.402A 845.00    3. Contusion of right foot, initial encounter  S90.31XA 924.20         Attestations:    Steve Cobb MD    Please note that this dictation was completed with [employer], the [employer] voice recognition software.   Quite often unanticipated grammatical, syntax, homophones, and other interpretive errors are inadvertently transcribed by the computer software. Please disregard these errors. Please excuse any errors that have escaped final proofreading. Thank you.

## 2021-12-07 ENCOUNTER — TELEPHONE (OUTPATIENT)
Dept: PRIMARY CARE CLINIC | Age: 86
End: 2021-12-07

## 2021-12-07 NOTE — TELEPHONE ENCOUNTER
----- Message from SamariamakerSQRelisha Code sent at 12/7/2021  9:55 AM EST -----  Subject: Appointment Request    Reason for Call: Urgent Cough Cold    QUESTIONS  Type of Appointment? Established Patient  Reason for appointment request? No appointments available during search  Additional Information for Provider? Pt had been vomiting since   Thanksgiving and losing weight daughters cell [phone] daughter work   [phone]  ---------------------------------------------------------------------------  --------------  [address]  What is the best way for the office to contact you? OK to leave message on   voicemail  Preferred Call Back Phone Number? [phone]  ---------------------------------------------------------------------------  --------------  SCRIPT ANSWERS  Relationship to Patient? Other  Representative Name? Robby George candido  Additional information verified (besides Name and Date of Birth)? Address  Are you currently unable to finish sentences due to any difficulty   breathing? No  Are you unable to swallow liquids? No  Are you having fevers (100.4 or greater), chills, or sweats? No  Do you have COPD, asthma or a chronic lung condition? No  Have your symptoms been present for more than 5 days? Yes   Have you been diagnosed with, awaiting test results for, or told that you   are suspected of having COVID-19 (Coronavirus)? (If patient has tested   negative or was tested as a requirement for work, school, or travel and   not based on symptoms, answer no)? No  Within the past two weeks have you developed any of the following symptoms   (answer no if symptoms have been present longer than 2 weeks or began   more than 2 weeks ago)? Fever or Chills, Cough, Shortness of breath or   difficulty breathing, Loss of taste or smell, Sore throat, Nasal   congestion, Sneezing or runny nose, Fatigue or generalized body aches   (answer no if pain is specific to a body part e.g. back pain), Diarrhea,   Headache?  No  Have you had close contact with someone with COVID-19 in the last 14 days? No  (Service Expert  click yes below to proceed with [employer] As Usual   Scheduling)?  Yes

## 2021-12-08 ENCOUNTER — TELEPHONE (OUTPATIENT)
Dept: PRIMARY CARE CLINIC | Age: 86
End: 2021-12-08

## 2021-12-08 ENCOUNTER — OFFICE VISIT (OUTPATIENT)
Dept: PRIMARY CARE CLINIC | Age: 86
End: 2021-12-08
Payer: MEDICARE

## 2021-12-08 VITALS
TEMPERATURE: 97.4 F | HEIGHT: 64 IN | WEIGHT: 135.6 LBS | BODY MASS INDEX: 23.15 KG/M2 | SYSTOLIC BLOOD PRESSURE: 134 MMHG | RESPIRATION RATE: 20 BRPM | DIASTOLIC BLOOD PRESSURE: 68 MMHG | HEART RATE: 96 BPM | OXYGEN SATURATION: 98 %

## 2021-12-08 DIAGNOSIS — R63.4 WEIGHT LOSS: ICD-10-CM

## 2021-12-08 DIAGNOSIS — R11.10 CHRONIC VOMITING: Primary | ICD-10-CM

## 2021-12-08 PROCEDURE — G8536 NO DOC ELDER MAL SCRN: HCPCS | Performed by: FAMILY MEDICINE

## 2021-12-08 PROCEDURE — 1090F PRES/ABSN URINE INCON ASSESS: CPT | Performed by: FAMILY MEDICINE

## 2021-12-08 PROCEDURE — 99214 OFFICE O/P EST MOD 30 MIN: CPT | Performed by: FAMILY MEDICINE

## 2021-12-08 PROCEDURE — 1101F PT FALLS ASSESS-DOCD LE1/YR: CPT | Performed by: FAMILY MEDICINE

## 2021-12-08 PROCEDURE — G8420 CALC BMI NORM PARAMETERS: HCPCS | Performed by: FAMILY MEDICINE

## 2021-12-08 PROCEDURE — G8427 DOCREV CUR MEDS BY ELIG CLIN: HCPCS | Performed by: FAMILY MEDICINE

## 2021-12-08 PROCEDURE — G8510 SCR DEP NEG, NO PLAN REQD: HCPCS | Performed by: FAMILY MEDICINE

## 2021-12-08 RX ORDER — SPIRONOLACTONE 25 MG/1
12.5 TABLET ORAL DAILY
Qty: 90 TABLET | Refills: 1
Start: 2021-12-08

## 2021-12-08 NOTE — TELEPHONE ENCOUNTER
Pls call patient. 80year old with chronic vomiting and 10 pound weight loss for 10 weeks needs to go to ER immediately. GI will not be able to see her soon enough.      Dr. Scott Alonzo

## 2021-12-08 NOTE — TELEPHONE ENCOUNTER
Patient's daughter called saying she does not want to go to the ER, and is asking to be seen in the office. She says that the vomiting is happening after she eats, but for some reason only in the evenings. No cough, fever or congestion. I was advised by Shirley to give her the first available and that is not until next week at 7:00, which is the only available appointment that is not slotted for same day and would be impossible to get here here. Wants to know if she can be seen Friday morning.

## 2021-12-08 NOTE — TELEPHONE ENCOUNTER
[id number] called with following information regarding this patient:  Weight loss of 10 lbs and chronic vomiting without nausea of 3 weeks. No edema. Patient can keep fluids down if take in small increments. No past GI issues. Last colonoscopy 8 years ago no history of EGD. Vitals WNL. No diarrhea, last BM last night. Patient is voiding well. Afebrile. Patient needs a GI referral asap. Patient is withering.

## 2021-12-08 NOTE — PROGRESS NOTES
HPI     Chief Complaint   Patient presents with    Vomiting     Vomiting after dinner only since [address] . Weight loss 10 pounds in the last two week. Fluid intake good. HPI:  Miriam Blackwell is a 80 y.o. female who is having significant changes to eating habits. Since Thanksgiving (so about 2 weeks) of recurrent vomiting after dinner primarily, typically within a few minutes. Not preceded by nausea and no associated abdominal pain. She says the vomiting comes on suddenly. She has associated decreased appetite and is not eating as much per family member, present during visit. No reflux. No bloating. No bloody emesis. No melena. Last bowel movement was yesterday. Typically has 3 bowel movements a week. She's lost 40 pounds this year, 10 over the last month. Allergies   Allergen Reactions    Seafood Hives     shellfish    Sulfa (Sulfonamide Antibiotics) Other (comments)     Isn't able to recall reaction at this time. Current Outpatient Medications   Medication Sig    spironolactone (ALDACTONE) 25 mg tablet Take 0.5 Tablets by mouth daily. Patient takes 12.5mg    Eliquis 5 mg tablet     timolol (TIMOPTIC) 0.5 % ophthalmic solution Apply to surgical wound twice daily until healed    cetirizine (ZYRTEC) 10 mg tablet TAKE 1 TABLET BY MOUTH EVERY DAY    fluticasone propionate (FLONASE) 50 mcg/actuation nasal spray USE 1 SPRAY INTO EACH NOSTRIL EVERY DAY    potassium chloride SR (K-TAB) 20 mEq tablet Take 1 Tab by mouth daily. (Patient not taking: Reported on 12/8/2021)     No current facility-administered medications for this visit. Review of Systems   Constitutional: Negative for chills and fever. Cardiovascular: Negative for palpitations. Gastrointestinal: Positive for vomiting. Negative for abdominal pain, blood in stool, constipation, heartburn, melena and nausea.        Reviewed PmHx, FmHx, SocHx as well as meds and allergies, updated and dated in the chart.         Objective     Visit Vitals  /68 (BP 1 Location: Right upper arm, BP Patient Position: Sitting, BP Cuff Size: Large adult)   Pulse 96   Temp 97.4 °F (36.3 °C) (Temporal)   Resp 20   Ht 5' 4\" (1.626 m)   Wt 135 lb 9.6 oz (61.5 kg)   SpO2 98%   BMI 23.28 kg/m²     Physical Exam  Vitals and nursing note reviewed. Constitutional:       Appearance: Normal appearance. Cardiovascular:      Rate and Rhythm: Normal rate and regular rhythm. Heart sounds: Normal heart sounds. No murmur heard. No gallop. Pulmonary:      Effort: Pulmonary effort is normal. No respiratory distress. Breath sounds: Normal breath sounds. Abdominal:      General: Abdomen is flat. Bowel sounds are normal. There is no distension. Palpations: There is no mass. Tenderness: There is no abdominal tenderness. Musculoskeletal:      Right lower leg: No edema. Left lower leg: No edema. Neurological:      General: No focal deficit present. Mental Status: She is alert and oriented to person, place, and time. Assessment and Plan     80year old with CHF with chronic daily vomiting without GERD symptoms along with weight loss, which is likely worsened by her vomiting. She was referred to ER by another provider and myself but refused to go to ER so came in for in person appt. I verbalized my concerns and that it will take time to get her in with GI. Patient still refuses to go to ER, family member is present. Will check labs, referral placed. Warning signs reviewed. Supportive care discused. Diagnoses and all orders for this visit:    1. Chronic vomiting  -     CBC WITH AUTOMATED DIFF  -     METABOLIC PANEL, COMPREHENSIVE  -     REFERRAL TO GASTROENTEROLOGY    2. Weight loss         Medication Side Effects and Warnings were discussed with patient. Patient Labs were reviewed and or requested. Patient Past Records were reviewed and or requested.          I have discussed the diagnosis with the patient and the intended plan as seen in the above orders. The patient has received an after-visit summary and questions were answered concerning future plans. I have discussed medication side effects and warnings with the patient as well.       Macarena Gutierrez MD  [address] [place] [address]

## 2021-12-09 LAB
ALBUMIN SERPL-MCNC: 3.8 G/DL (ref 3.6–4.6)
ALBUMIN/GLOB SERPL: 1.1 {RATIO} (ref 1.2–2.2)
ALP SERPL-CCNC: 109 IU/L (ref 44–121)
ALT SERPL-CCNC: 12 IU/L (ref 0–32)
AST SERPL-CCNC: 19 IU/L (ref 0–40)
BASOPHILS # BLD AUTO: 0 X10E3/UL (ref 0–0.2)
BASOPHILS NFR BLD AUTO: 0 %
BILIRUB SERPL-MCNC: 0.5 MG/DL (ref 0–1.2)
BUN SERPL-MCNC: 10 MG/DL (ref 8–27)
BUN/CREAT SERPL: 14 (ref 12–28)
CALCIUM SERPL-MCNC: 9.3 MG/DL (ref 8.7–10.3)
CHLORIDE SERPL-SCNC: 90 MMOL/L (ref 96–106)
CO2 SERPL-SCNC: 24 MMOL/L (ref 20–29)
CREAT SERPL-MCNC: 0.7 MG/DL (ref 0.57–1)
EOSINOPHIL # BLD AUTO: 0 X10E3/UL (ref 0–0.4)
EOSINOPHIL NFR BLD AUTO: 1 %
ERYTHROCYTE [DISTWIDTH] IN BLOOD BY AUTOMATED COUNT: 11.8 % (ref 11.7–15.4)
GLOBULIN SER CALC-MCNC: 3.6 G/DL (ref 1.5–4.5)
GLUCOSE SERPL-MCNC: 108 MG/DL (ref 65–99)
HCT VFR BLD AUTO: 31.8 % (ref 34–46.6)
HGB BLD-MCNC: 10.9 G/DL (ref 11.1–15.9)
IMM GRANULOCYTES # BLD AUTO: 0 X10E3/UL (ref 0–0.1)
IMM GRANULOCYTES NFR BLD AUTO: 0 %
LYMPHOCYTES # BLD AUTO: 2.1 X10E3/UL (ref 0.7–3.1)
LYMPHOCYTES NFR BLD AUTO: 45 %
MCH RBC QN AUTO: 29.1 PG (ref 26.6–33)
MCHC RBC AUTO-ENTMCNC: 34.3 G/DL (ref 31.5–35.7)
MCV RBC AUTO: 85 FL (ref 79–97)
MONOCYTES # BLD AUTO: 0.5 X10E3/UL (ref 0.1–0.9)
MONOCYTES NFR BLD AUTO: 11 %
NEUTROPHILS # BLD AUTO: 2.1 X10E3/UL (ref 1.4–7)
NEUTROPHILS NFR BLD AUTO: 43 %
PLATELET # BLD AUTO: 301 X10E3/UL (ref 150–450)
POTASSIUM SERPL-SCNC: 4 MMOL/L (ref 3.5–5.2)
PROT SERPL-MCNC: 7.4 G/DL (ref 6–8.5)
RBC # BLD AUTO: 3.74 X10E6/UL (ref 3.77–5.28)
SODIUM SERPL-SCNC: 131 MMOL/L (ref 134–144)
WBC # BLD AUTO: 4.8 X10E3/UL (ref 3.4–10.8)

## 2021-12-09 NOTE — PROGRESS NOTES
Pls call. Labs show she is now anemic and sodium is low. It is very important she sees GI (someone should have called her with appt). In the meantime, she can use tums in the evening after dinner to see if it helps with symptoms and if weight loss continues or she develops worsening symptoms like melena, blood in emesis, can't keep anything down, fatigue--she goes to ER and does not wait for GI appt.    Dr. Darline Ho

## 2021-12-10 NOTE — PROGRESS NOTES
Called and spoke with patient regarding bw results. Doing better, following the \"Brat\" diet.  Will follow up with GI.

## 2021-12-26 ENCOUNTER — APPOINTMENT (OUTPATIENT)
Dept: CT IMAGING | Age: 86
End: 2021-12-26
Attending: EMERGENCY MEDICINE
Payer: MEDICARE

## 2021-12-26 ENCOUNTER — APPOINTMENT (OUTPATIENT)
Dept: GENERAL RADIOLOGY | Age: 86
End: 2021-12-26
Attending: EMERGENCY MEDICINE
Payer: MEDICARE

## 2021-12-26 ENCOUNTER — HOSPITAL ENCOUNTER (EMERGENCY)
Age: 86
Discharge: ANOTHER HOSPITAL | End: 2021-12-26
Attending: EMERGENCY MEDICINE
Payer: MEDICARE

## 2021-12-26 VITALS
HEIGHT: 64 IN | TEMPERATURE: 97.9 F | SYSTOLIC BLOOD PRESSURE: 104 MMHG | DIASTOLIC BLOOD PRESSURE: 66 MMHG | WEIGHT: 135 LBS | BODY MASS INDEX: 23.05 KG/M2 | HEART RATE: 89 BPM | RESPIRATION RATE: 18 BRPM | OXYGEN SATURATION: 95 %

## 2021-12-26 DIAGNOSIS — R11.11 NON-INTRACTABLE VOMITING WITHOUT NAUSEA, UNSPECIFIED VOMITING TYPE: Primary | ICD-10-CM

## 2021-12-26 LAB
ALBUMIN SERPL-MCNC: 2.6 G/DL (ref 3.5–5)
ALBUMIN/GLOB SERPL: 0.6 {RATIO} (ref 1.1–2.2)
ALP SERPL-CCNC: 91 U/L (ref 45–117)
ALT SERPL-CCNC: 11 U/L (ref 12–78)
ANION GAP SERPL CALC-SCNC: 8 MMOL/L (ref 5–15)
APPEARANCE UR: CLEAR
AST SERPL W P-5'-P-CCNC: 19 U/L (ref 15–37)
BACTERIA URNS QL MICRO: ABNORMAL /HPF
BASOPHILS # BLD: 0 K/UL (ref 0–0.1)
BASOPHILS NFR BLD: 0 % (ref 0–1)
BILIRUB DIRECT SERPL-MCNC: 0.2 MG/DL (ref 0–0.2)
BILIRUB SERPL-MCNC: 0.7 MG/DL (ref 0.2–1)
BILIRUB UR QL: NEGATIVE
BNP SERPL-MCNC: ABNORMAL PG/ML
BUN SERPL-MCNC: 10 MG/DL (ref 6–20)
BUN/CREAT SERPL: 12 (ref 12–20)
CA-I BLD-MCNC: 8.3 MG/DL (ref 8.5–10.1)
CHLORIDE SERPL-SCNC: 90 MMOL/L (ref 97–108)
CO2 SERPL-SCNC: 28 MMOL/L (ref 21–32)
COLOR UR: YELLOW
CREAT SERPL-MCNC: 0.86 MG/DL (ref 0.55–1.02)
DIFFERENTIAL METHOD BLD: ABNORMAL
EOSINOPHIL # BLD: 0 K/UL (ref 0–0.4)
EOSINOPHIL NFR BLD: 0 % (ref 0–7)
EPITH CASTS URNS QL MICRO: ABNORMAL /LPF
ERYTHROCYTE [DISTWIDTH] IN BLOOD BY AUTOMATED COUNT: 12.8 % (ref 11.5–14.5)
GLOBULIN SER CALC-MCNC: 4.6 G/DL (ref 2–4)
GLUCOSE SERPL-MCNC: 125 MG/DL (ref 65–100)
GLUCOSE UR STRIP.AUTO-MCNC: NEGATIVE MG/DL
HCT VFR BLD AUTO: 28.6 % (ref 35–47)
HGB BLD-MCNC: 9.6 G/DL (ref 11.5–16)
HGB UR QL STRIP: ABNORMAL
IMM GRANULOCYTES # BLD AUTO: 0 K/UL (ref 0–0.04)
IMM GRANULOCYTES NFR BLD AUTO: 0 % (ref 0–0.5)
KETONES UR QL STRIP.AUTO: NEGATIVE MG/DL
LEUKOCYTE ESTERASE UR QL STRIP.AUTO: NEGATIVE
LYMPHOCYTES # BLD: 0.9 K/UL (ref 0.8–3.5)
LYMPHOCYTES NFR BLD: 19 % (ref 12–49)
MCH RBC QN AUTO: 28.9 PG (ref 26–34)
MCHC RBC AUTO-ENTMCNC: 33.6 G/DL (ref 30–36.5)
MCV RBC AUTO: 86.1 FL (ref 80–99)
MONOCYTES # BLD: 0.4 K/UL (ref 0–1)
MONOCYTES NFR BLD: 9 % (ref 5–13)
NEUTS SEG # BLD: 3.3 K/UL (ref 1.8–8)
NEUTS SEG NFR BLD: 72 % (ref 32–75)
NITRITE UR QL STRIP.AUTO: NEGATIVE
PH UR STRIP: 7 [PH] (ref 5–8)
PLATELET # BLD AUTO: 278 K/UL (ref 150–400)
PMV BLD AUTO: 9.8 FL (ref 8.9–12.9)
POTASSIUM SERPL-SCNC: 3.6 MMOL/L (ref 3.5–5.1)
PROT SERPL-MCNC: 7.2 G/DL (ref 6.4–8.2)
PROT UR STRIP-MCNC: NEGATIVE MG/DL
RBC # BLD AUTO: 3.32 M/UL (ref 3.8–5.2)
RBC #/AREA URNS HPF: ABNORMAL /HPF (ref 0–5)
SODIUM SERPL-SCNC: 126 MMOL/L (ref 136–145)
SP GR UR REFRACTOMETRY: 1 (ref 1–1.03)
TROPONIN-HIGH SENSITIVITY: 25 NG/L (ref 0–51)
UROBILINOGEN UR QL STRIP.AUTO: 0.1 EU/DL (ref 0.2–1)
WBC # BLD AUTO: 4.7 K/UL (ref 3.6–11)
WBC URNS QL MICRO: ABNORMAL /HPF (ref 0–4)

## 2021-12-26 PROCEDURE — 71045 X-RAY EXAM CHEST 1 VIEW: CPT

## 2021-12-26 PROCEDURE — 96374 THER/PROPH/DIAG INJ IV PUSH: CPT

## 2021-12-26 PROCEDURE — 74011250637 HC RX REV CODE- 250/637: Performed by: EMERGENCY MEDICINE

## 2021-12-26 PROCEDURE — 74177 CT ABD & PELVIS W/CONTRAST: CPT

## 2021-12-26 PROCEDURE — 80076 HEPATIC FUNCTION PANEL: CPT

## 2021-12-26 PROCEDURE — 96361 HYDRATE IV INFUSION ADD-ON: CPT

## 2021-12-26 PROCEDURE — 84484 ASSAY OF TROPONIN QUANT: CPT

## 2021-12-26 PROCEDURE — 80048 BASIC METABOLIC PNL TOTAL CA: CPT

## 2021-12-26 PROCEDURE — 85025 COMPLETE CBC W/AUTO DIFF WBC: CPT

## 2021-12-26 PROCEDURE — 81001 URINALYSIS AUTO W/SCOPE: CPT

## 2021-12-26 PROCEDURE — 36415 COLL VENOUS BLD VENIPUNCTURE: CPT

## 2021-12-26 PROCEDURE — 83880 ASSAY OF NATRIURETIC PEPTIDE: CPT

## 2021-12-26 PROCEDURE — 93005 ELECTROCARDIOGRAM TRACING: CPT

## 2021-12-26 PROCEDURE — 74011250636 HC RX REV CODE- 250/636: Performed by: EMERGENCY MEDICINE

## 2021-12-26 PROCEDURE — 74011000636 HC RX REV CODE- 636: Performed by: EMERGENCY MEDICINE

## 2021-12-26 PROCEDURE — 99285 EMERGENCY DEPT VISIT HI MDM: CPT

## 2021-12-26 RX ORDER — MAG HYDROX/ALUMINUM HYD/SIMETH 200-200-20
30 SUSPENSION, ORAL (FINAL DOSE FORM) ORAL ONCE
Status: COMPLETED | OUTPATIENT
Start: 2021-12-26 | End: 2021-12-26

## 2021-12-26 RX ORDER — ONDANSETRON 2 MG/ML
4 INJECTION INTRAMUSCULAR; INTRAVENOUS
Status: COMPLETED | OUTPATIENT
Start: 2021-12-26 | End: 2021-12-26

## 2021-12-26 RX ORDER — ONDANSETRON 4 MG/1
4 TABLET, FILM COATED ORAL
Qty: 7 TABLET | Refills: 0 | Status: SHIPPED | OUTPATIENT
Start: 2021-12-26 | End: 2021-12-28 | Stop reason: SDUPTHER

## 2021-12-26 RX ADMIN — SODIUM CHLORIDE 500 ML: 9 INJECTION, SOLUTION INTRAVENOUS at 07:46

## 2021-12-26 RX ADMIN — ALUMINUM HYDROXIDE, MAGNESIUM HYDROXIDE, AND SIMETHICONE 30 ML: 200; 200; 20 SUSPENSION ORAL at 08:15

## 2021-12-26 RX ADMIN — IOPAMIDOL 100 ML: 755 INJECTION, SOLUTION INTRAVENOUS at 09:00

## 2021-12-26 RX ADMIN — ONDANSETRON 4 MG: 2 INJECTION INTRAMUSCULAR; INTRAVENOUS at 07:46

## 2021-12-26 NOTE — ED PROVIDER NOTES
EMERGENCY DEPARTMENT HISTORY AND PHYSICAL EXAM      Date: 12/26/2021  Patient Name: Jose Alberto Mazariegos Kwabena      History of Presenting Illness     No chief complaint on file. History Provided By: Patient and Patient's Daughter    HPI: Wilbur Moreau, 80 y.o. female with a past medical history significant for HTN, HLD, Afib s/p ablation on [facility] presents to the ED with cc of vomiting. Onset 2-3wks ago with NBNB emesis soon after every meal. Tonight dry-heaving but denies nausea. Tolerating PO fluids. No F/C, abd pain, diarrhea, CP, SOB, melena hematochezia, dysuria, frequency. Only abdominal pain is \"gas pain\" that resolves when she passes gas on toilet. Has had intermittent \"hot\" urine for weeks/months. Chronic cough. Has f/u with GI arranged by PCP - EGD with Dr. Domingo Mcgarry on 1/6. Lost 20lb after ablation in May and additional 10lb since sx started 2-3wks ago. There are no other complaints, changes, or physical findings at this time. PCP: Jeri Burns MD    Current Facility-Administered Medications   Medication Dose Route Frequency Provider Last Rate Last Admin    ondansetron (ZOFRAN) injection 4 mg  4 mg IntraVENous NOW John Hale MD        sodium chloride 0.9 % bolus infusion 500 mL  500 mL IntraVENous ONCE John Hale MD         Current Outpatient Medications   Medication Sig Dispense Refill    spironolactone (ALDACTONE) 25 mg tablet Take 0.5 Tablets by mouth daily. Patient takes 12.5mg 90 Tablet 1    Eliquis 5 mg tablet       timolol (TIMOPTIC) 0.5 % ophthalmic solution Apply to surgical wound twice daily until healed 10 mL 1    cetirizine (ZYRTEC) 10 mg tablet TAKE 1 TABLET BY MOUTH EVERY DAY  11    fluticasone propionate (FLONASE) 50 mcg/actuation nasal spray USE 1 SPRAY INTO EACH NOSTRIL EVERY DAY  11    potassium chloride SR (K-TAB) 20 mEq tablet Take 1 Tab by mouth daily.  (Patient not taking: Reported on 12/8/2021) 5 Tab 0       Past History     Past Medical History:  Past Medical History: Diagnosis Date    CAD (coronary artery disease)     ABLATION    Hyperlipidemia     Hypertension     Skin cancer     Uterine cancer ([facility] [address] 1998       Past Surgical History:  Past Surgical History:   Procedure Laterality Date    HX HYSTERECTOMY      HX PACEMAKER         Family History:  Family History   Problem Relation Age of Onset    Tuberculosis Mother     Tuberculosis Father        Social History:  Social History     Tobacco Use    Smoking status: Never Smoker    Smokeless tobacco: Never Used   Vaping Use    Vaping Use: Never used   Substance Use Topics    Alcohol use: No    Drug use: No       Allergies: Allergies   Allergen Reactions    Seafood Hives     shellfish    Sulfa (Sulfonamide Antibiotics) Other (comments)     Isn't able to recall reaction at this time. Review of Systems   Constitutional: Negative except as in HPI. Eyes: Negative except as in HPI.  ENT: Negative except as in HPI. Cardiovascular: Negative except as in HPI. Respiratory: Negative except as in HPI. Gastrointestinal: Negative except as in HPI. Genitourinary: Negative except as in HPI. Musculoskeletal: Negative except as in HPI. Integumentary: Negative except as in HPI. Neurological: Negative except as in HPI. Psychiatric: Negative except as in HPI. Endocrine: Negative except as in HPI. Hematologic/Lymphatic: Negative except as in HPI. Allergic/Immunologic: Negative except as in HPI. Physical Exam   Constitutional: Awake and alert, interactive, NAD  Eyes: PERRL, no injection or scleral icterus, no discharge  HEENT: NCAT, neck supple, MMM, no oropharyngeal exudates  CV: RRR, no m/r/g  Respiratory: CTAB, no r/r/w  GI: Abd soft, nondistended, nontender  : Deferred  MSK: FROM, no joint effusions or edema  Skin: No rashes  Neuro: CN2-12 intact, symmetric facies, fluent speech.   Psych: Well-groomed, normal speech, behavior, appropriate mood    Lab and Diagnostic Study Results     Labs -   No results found for this or any previous visit (from the past 12 hour(s)). Radiologic Studies -   [unfilled]  CT Results  (Last 48 hours)      None          CXR Results  (Last 48 hours)      None            Medical Decision Making and ED Course   - I am the first and primary provider for this patient AND AM THE PRIMARY PROVIDER OF RECORD. - I reviewed the vital signs, available nursing notes, past medical history, past surgical history, family history and social history. - Initial assessment performed. The patients presenting problems have been discussed, and the staff are in agreement with the care plan formulated and outlined with them. I have encouraged them to ask questions as they arise throughout their visit. Vital Signs-Reviewed the patient's vital signs. No data found. EKG interpretation: Ventricular-paced rhythm @89bpm, , QTc 535, nl axis, no JOAQUIN/STD or nonanatomic TWI. Provider Notes (Medical Decision Making):   86K w/vomiting after meals, worse in evenings c/f GERD/gastritis. However given age and weight loss will get CTAP to eval for mass, obstruction. Timeline less c/w ACS but will get trop, EKG given age and female gender possibly atypical sx of ACS. Zofran, gentle IVF and antacid for sx control. Dispo pending workup. ED Course:       ED Course as of 12/26/21 1830   Sun Dec 26, 2021   0755 EKG, 12 LEAD, INITIAL  EKG nonischemic [YA]   0814 Sodium(!): 126  Down from 131. [YA]   0815 Troponin-High Sensitivity: 25 [YA]   8913 HGB(!): 9.6  Down from 10.9 [YA]   0931 Epithelial cells: Few [YA]   8124 Bacteria(!): 1+ [YA]   0931 WBC: 0-4  Not c/w UTI. [YA]   1004 XR CHEST PORT    IMPRESSION  In the acute setting findings are suggestive of CHF/pulmonary edema  with associated effusions, although infection could also be in the differential. [YA]   1004 CT ABD PELV W CONT       IMPRESSION  No acute abdominopelvic abnormality.  Lung bases demonstrating  findings suggesting CHF/pulmonary edema, infection or combination of these. [YA]   4082 Patient has known fluid overload on spironolactone, discussed with patient and daughter. Has Cardiologist Dr. Deepa Coronel she can see in next week, has been losing not gaining weight. Will dc with f/u with Dr. Deepa Coronel. Tolerating PO, will give zofran for home. Return precautions discussed. [YA]      ED Course User Index  [YA] Mireya Rojas MD         Disposition     Disposition: DC- Adult Discharges: All of the diagnostic tests were reviewed and questions answered. Diagnosis, care plan and treatment options were discussed. The patient understands the instructions and will follow up as directed. The patients results have been reviewed with them. They have been counseled regarding their diagnosis. The patient verbally convey understanding and agreement of the signs, symptoms, diagnosis, treatment and prognosis and additionally agrees to follow up as recommended with their PCP in 24 - 48 hours. They also agree with the care-plan and convey that all of their questions have been answered. I have also put together some discharge instructions for them that include: 1) educational information regarding their diagnosis, 2) how to care for their diagnosis at home, as well a 3) list of reasons why they would want to return to the ED prior to their follow-up appointment, should their condition change. Discharged      Diagnosis     Clinical Impression:   1. Non-intractable vomiting without nausea, unspecified vomiting type        Attestations:     Kenrick Avalos MD

## 2021-12-26 NOTE — ED NOTES
Patient assisted to restroom, ambulated to restroom with minimal assistance needed. Patient reports that she feels much better since she received zofran & IV fluids. Patient voided approximately 200mL of clear yellow urine.

## 2021-12-26 NOTE — ED TRIAGE NOTES
Patient reports vomiting x few weeks. Reports diarrhea once a few days ago. Denies abdominal pain, reports bloating at times.

## 2021-12-26 NOTE — DISCHARGE INSTRUCTIONS
Ysabel Keller was seen in the ER for her vomiting. This seems to be from acid reflux or irritation of the stomach as her imaging did not show any blockages. Her sodium and blood levels (anemia) are slowly dropping, which can be a sign of worsening heart failure. You should follow up with Dr. Aneta Burt this week to make sure she doesn't need to adjust her medications. Follow up with Dr. Zahra Cid for her endoscopy to evaluate her possible gastritis. Feed her a bland diet for the next week. We have given her some antinausea pills and she can take over the counter antacids like maalox to coat and soothe her stomach. Return to the ER for any new or worsening symptoms. Thank you! Thank you for allowing me to care for you in the emergency department. I sincerely hope that you are satisfied with your visit today. It is my goal to provide you with excellent care. Below you will find a list of your labs and imaging from your visit today. Should you have any questions regarding these results please do not hesitate to call the emergency department. Labs -     Recent Results (from the past 12 hour(s))   METABOLIC PANEL, BASIC    Collection Time: 12/26/21  7:30 AM   Result Value Ref Range    Sodium 126 (L) 136 - 145 mmol/L    Potassium 3.6 3.5 - 5.1 mmol/L    Chloride 90 (L) 97 - 108 mmol/L    CO2 28 21 - 32 mmol/L    Anion gap 8 5 - 15 mmol/L    Glucose 125 (H) 65 - 100 mg/dL    BUN 10 6 - 20 mg/dL    Creatinine 0.86 0.55 - 1.02 mg/dL    BUN/Creatinine ratio 12 12 - 20      GFR est AA >60 >60 ml/min/1.73m2    GFR est non-AA >60 >60 ml/min/1.73m2    Calcium 8.3 (L) 8.5 - 10.1 mg/dL   HEPATIC FUNCTION PANEL    Collection Time: 12/26/21  7:30 AM   Result Value Ref Range    Protein, total 7.2 6.4 - 8.2 g/dL    Albumin 2.6 (L) 3.5 - 5.0 g/dL    Globulin 4.6 (H) 2.0 - 4.0 g/dL    A-G Ratio 0.6 (L) 1.1 - 2.2      Bilirubin, total 0.7 0.2 - 1.0 mg/dL    Bilirubin, direct 0.2 0.0 - 0.2 mg/dL    Alk.  phosphatase 91 45 - 117 U/L    AST (SGOT) 19 15 - 37 U/L    ALT (SGPT) 11 (L) 12 - 78 U/L   CBC WITH AUTOMATED DIFF    Collection Time: 12/26/21  7:30 AM   Result Value Ref Range    WBC 4.7 3.6 - 11.0 K/uL    RBC 3.32 (L) 3.80 - 5.20 M/uL    HGB 9.6 (L) 11.5 - 16.0 g/dL    HCT 28.6 (L) 35.0 - 47.0 %    MCV 86.1 80.0 - 99.0 FL    MCH 28.9 26.0 - 34.0 PG    MCHC 33.6 30.0 - 36.5 g/dL    RDW 12.8 11.5 - 14.5 %    PLATELET 799 655 - 334 K/uL    MPV 9.8 8.9 - 12.9 FL    NEUTROPHILS 72 32 - 75 %    LYMPHOCYTES 19 12 - 49 %    MONOCYTES 9 5 - 13 %    EOSINOPHILS 0 0 - 7 %    BASOPHILS 0 0 - 1 %    IMMATURE GRANULOCYTES 0 0.0 - 0.5 %    ABS. NEUTROPHILS 3.3 1.8 - 8.0 K/UL    ABS. LYMPHOCYTES 0.9 0.8 - 3.5 K/UL    ABS. MONOCYTES 0.4 0.0 - 1.0 K/UL    ABS. EOSINOPHILS 0.0 0.0 - 0.4 K/UL    ABS. BASOPHILS 0.0 0.0 - 0.1 K/UL    ABS. IMM. GRANS. 0.0 0.00 - 0.04 K/UL    DF AUTOMATED     TROPONIN-HIGH SENSITIVITY    Collection Time: 12/26/21  7:30 AM   Result Value Ref Range    Troponin-High Sensitivity 25 0 - 51 ng/L   URINALYSIS W/ RFLX MICROSCOPIC    Collection Time: 12/26/21  7:45 AM   Result Value Ref Range    Color Yellow      Appearance Clear Clear      Specific gravity 1.005 1.003 - 1.030      pH (UA) 7.0 5.0 - 8.0      Protein Negative Negative mg/dL    Glucose Negative Negative mg/dL    Ketone Negative Negative mg/dL    Bilirubin Negative Negative      Blood Small (A) Negative      Urobilinogen 0.1 (L) 0.2 - 1.0 EU/dL    Nitrites Negative Negative      Leukocyte Esterase Negative Negative     URINE MICROSCOPIC    Collection Time: 12/26/21  7:45 AM   Result Value Ref Range    WBC 0-4 0 - 4 /hpf    RBC 0-5 0 - 5 /hpf    Epithelial cells Few Few /lpf    Bacteria 1+ (A) Negative /hpf       Radiologic Studies -   CT ABD PELV W CONT   Final Result   No acute abdominopelvic abnormality. Lung bases demonstrating   findings suggesting CHF/pulmonary edema, infection or combination of these.       XR CHEST PORT   Final Result   In the acute setting findings are suggestive of CHF/pulmonary edema   with associated effusions, although infection could also be in the differential.        CT Results  (Last 48 hours)                 12/26/21 0903  CT ABD PELV W CONT Final result    Impression:  No acute abdominopelvic abnormality. Lung bases demonstrating   findings suggesting CHF/pulmonary edema, infection or combination of these. Narrative:  HISTORY:  eval for obstruction, infx   Dose reduction technique: All CT scans at this facility are performed using dose reduction optimization   technique as appropriate on the exam including the following: Automated exposure   control, adjustment of the MA and/or KV according to patient size and/or use of   iterative reconstructive technique. TECHNIQUE:  CT ABD PELV W CONT                            100 cc Isovue-370 injected. COMPARISON: 74 months prior   LIMITATIONS: None       CHEST: Diffuse septal thickening, and probable left lung base atelectasis. The   Moderate bilateral pleural effusions. Calcified mitral annulus in the setting of   cardiomegaly. Distal aspect of AICD identified. LIVER: Normal   GALLBLADDER: Normal   BILIARY TREE: Normal   PANCREAS: Normal   SPLEEN: Normal   ADRENAL GLANDS: Normal   KIDNEYS/URETERS/BLADDER: Normal   AORTA/RETROPERITONEUM: Normal   BOWEL/MESENTERY: Normal   APPENDIX: The appendix is not identified with certainty; there are no secondary   changes of inflammation in the right lower quadrant to suggest acute   appendicitis. PERITONEAL CAVITY: Normal   REPRODUCTIVE ORGANS: Hysterectomy   BONE/TISSUES: No acute abnormality.        OTHER: None                 CXR Results  (Last 48 hours)                 12/26/21 0830  XR CHEST PORT Final result    Impression:  In the acute setting findings are suggestive of CHF/pulmonary edema   with associated effusions, although infection could also be in the differential.       Narrative:  HISTORY:  vomiting       TECHNIQUE:  XR CHEST PORT COMPARISON: Abdominopelvic CT performed on this same day and which visualized   the lung bases. LIMITATIONS: None       TUBES/LINES: Dual-lead left chest wall AICD appears in place       LUNG PARENCHYMA: Diffuse interstitial marking prominence   TRACHEA/BRONCHI: Bronchial wall thickening diffusely   PULMONARY VESSELS: Normal   PLEURA: Small right, moderate left pleural effusions   HEART: Multichamber cardiomegaly   AORTIC SHADOW:Normal.     MEDIASTINUM: Normal   BONE/SOFT TISSUES: No acute abnormality. OTHER: None                      If you feel that you have not received excellent quality care or timely care, please ask to speak to the nurse manager. Please choose us in the future for your continued health care needs. ------------------------------------------------------------------------------------------------------------  The exam and treatment you received in the Emergency Department were for an urgent problem and are not intended as complete care. It is important that you follow-up with a doctor, nurse practitioner, or physician assistant to:  (1) confirm your diagnosis,  (2) re-evaluation of changes in your illness and treatment, and  (3) for ongoing care. If your symptoms become worse or you do not improve as expected and you are unable to reach your usual health care provider, you should return to the Emergency Department. We are available 24 hours a day. Please take your discharge instructions with you when you go to your follow-up appointment. If you have any problem arranging a follow-up appointment, contact the Emergency Department immediately. If a prescription has been provided, please have it filled as soon as possible to prevent a delay in treatment. Read the entire medication instruction sheet provided to you by the pharmacy.  If you have any questions or reservations about taking the medication due to side effects or interactions with other medications, please call your primary care physician or contact the ER to speak with the charge nurse. Make an appointment with your family doctor or the physician you were referred to for follow-up of this visit as instructed on your discharge paperwork, as this is a mandatory follow-up. Return to the ER if you are unable to be seen or if you are unable to be seen in a timely manner. If you have any problem arranging the follow-up visit, contact the Emergency Department immediately.

## 2021-12-27 LAB
ATRIAL RATE: 89 BPM
CALCULATED P AXIS, ECG09: 78 DEGREES
CALCULATED R AXIS, ECG10: 142 DEGREES
CALCULATED T AXIS, ECG11: 57 DEGREES
DIAGNOSIS, 93000: NORMAL
Q-T INTERVAL, ECG07: 440 MS
QRS DURATION, ECG06: 140 MS
QTC CALCULATION (BEZET), ECG08: 535 MS
VENTRICULAR RATE, ECG03: 89 BPM

## 2021-12-27 NOTE — TELEPHONE ENCOUNTER
Pts daughter took her to the ER 12- she was prescribed zofran which was helping her some but only was given 7 pills is there anyway more can be called in for her , please let daughter Wesley Phillips know @ [phone]

## 2021-12-28 RX ORDER — ONDANSETRON 4 MG/1
4 TABLET, FILM COATED ORAL
Qty: 7 TABLET | Refills: 0 | Status: SHIPPED | OUTPATIENT
Start: 2021-12-28 | End: 2021-12-29 | Stop reason: SDUPTHER

## 2021-12-29 ENCOUNTER — OFFICE VISIT (OUTPATIENT)
Dept: PRIMARY CARE CLINIC | Age: 86
End: 2021-12-29
Payer: MEDICARE

## 2021-12-29 ENCOUNTER — TELEPHONE (OUTPATIENT)
Dept: PRIMARY CARE CLINIC | Age: 86
End: 2021-12-29

## 2021-12-29 VITALS
SYSTOLIC BLOOD PRESSURE: 119 MMHG | DIASTOLIC BLOOD PRESSURE: 63 MMHG | HEART RATE: 90 BPM | HEIGHT: 64 IN | OXYGEN SATURATION: 98 % | TEMPERATURE: 97.6 F | RESPIRATION RATE: 18 BRPM | BODY MASS INDEX: 24.04 KG/M2 | WEIGHT: 140.8 LBS

## 2021-12-29 DIAGNOSIS — I50.9 CHRONIC CONGESTIVE HEART FAILURE, UNSPECIFIED HEART FAILURE TYPE (HCC): ICD-10-CM

## 2021-12-29 DIAGNOSIS — D64.9 ANEMIA, UNSPECIFIED TYPE: ICD-10-CM

## 2021-12-29 DIAGNOSIS — R11.10 CHRONIC VOMITING: Primary | ICD-10-CM

## 2021-12-29 PROCEDURE — 1101F PT FALLS ASSESS-DOCD LE1/YR: CPT | Performed by: FAMILY MEDICINE

## 2021-12-29 PROCEDURE — G8536 NO DOC ELDER MAL SCRN: HCPCS | Performed by: FAMILY MEDICINE

## 2021-12-29 PROCEDURE — G8510 SCR DEP NEG, NO PLAN REQD: HCPCS | Performed by: FAMILY MEDICINE

## 2021-12-29 PROCEDURE — 99214 OFFICE O/P EST MOD 30 MIN: CPT | Performed by: FAMILY MEDICINE

## 2021-12-29 PROCEDURE — G8420 CALC BMI NORM PARAMETERS: HCPCS | Performed by: FAMILY MEDICINE

## 2021-12-29 PROCEDURE — G8427 DOCREV CUR MEDS BY ELIG CLIN: HCPCS | Performed by: FAMILY MEDICINE

## 2021-12-29 PROCEDURE — 1090F PRES/ABSN URINE INCON ASSESS: CPT | Performed by: FAMILY MEDICINE

## 2021-12-29 RX ORDER — ONDANSETRON 4 MG/1
4 TABLET, FILM COATED ORAL
Qty: 7 TABLET | Refills: 0 | Status: SHIPPED | OUTPATIENT
Start: 2021-12-29

## 2021-12-29 NOTE — PROGRESS NOTES
Visit Vitals  /63 (BP 1 Location: Left upper arm, BP Patient Position: Sitting, BP Cuff Size: Large adult)   Pulse 90   Temp 97.6 °F (36.4 °C) (Temporal)   Resp 18   Ht 5' 4\" (1.626 m)   Wt 140 lb 12.8 oz (63.9 kg)   SpO2 98%   BMI 24.17 kg/m²     Chief Complaint   Patient presents with    Follow-up     ER visit     1. Have you been to the ER, urgent care clinic since your last visit? Hospitalized since your last visit? No    2. Have you seen or consulted any other health care providers outside of the [address] [place] [address] since your last visit? Include any pap smears or colon screening.  No

## 2021-12-29 NOTE — TELEPHONE ENCOUNTER
Pt is wondering why only 7 pills were called in for her zofran she said she will be needing this daily and was told she would be on his the rest of her life since this is the only thing that helps

## 2021-12-29 NOTE — PROGRESS NOTES
HPI     Chief Complaint   Patient presents with    Follow-up     ER visit        HPI:  Jacinto Church is a 80 y.o. female who is here for ER follow up. Chronic nausea: Patient presented to the emergency room on December 26 with vomiting. The vomiting was reported as nonbloody nonbilious, occurs after every meal.  She was not having any associated abdominal pain or diarrhea. This has been an ongoing issue for which patient has an EGD scheduled with Dr. Howard Flores on January 6. Chest x-ray showed CHF which is a known problem and followed by her cardiologist Dr. Samreen Gutierrez. CT abdomen did not show any acute pathology. Labs show hyponatremia, likely 2/2 CHF. CHF: followed by Dr. Samreen Gutierrez, feels okay, denies dyspnea or chest pain. Currently: nausea/vomiting has resolved on zofran. No abdominal pain. No melena. Allergies   Allergen Reactions    Codeine Other (comments)    Seafood Hives     shellfish    Sulfa (Sulfonamide Antibiotics) Other (comments)     Isn't able to recall reaction at this time. Current Outpatient Medications   Medication Sig    ondansetron hcl (Zofran) 4 mg tablet Take 1 Tablet by mouth every eight (8) hours as needed for Nausea or Vomiting.  spironolactone (ALDACTONE) 25 mg tablet Take 0.5 Tablets by mouth daily. Patient takes 12.5mg    Eliquis 5 mg tablet     timolol (TIMOPTIC) 0.5 % ophthalmic solution Apply to surgical wound twice daily until healed    cetirizine (ZYRTEC) 10 mg tablet TAKE 1 TABLET BY MOUTH EVERY DAY    fluticasone propionate (FLONASE) 50 mcg/actuation nasal spray USE 1 SPRAY INTO EACH NOSTRIL EVERY DAY    potassium chloride SR (K-TAB) 20 mEq tablet Take 1 Tab by mouth daily. (Patient not taking: Reported on 12/8/2021)     No current facility-administered medications for this visit. Review of Systems   Constitutional: Negative for chills and fever.    Gastrointestinal: Negative for abdominal pain, blood in stool, nausea and vomiting. Reviewed PmHx, FmHx, SocHx as well as meds and allergies, updated and dated in the chart. Objective     Visit Vitals  /63 (BP 1 Location: Left upper arm, BP Patient Position: Sitting, BP Cuff Size: Large adult)   Pulse 90   Temp 97.6 °F (36.4 °C) (Temporal)   Resp 18   Ht 5' 4\" (1.626 m)   Wt 140 lb 12.8 oz (63.9 kg)   SpO2 98%   BMI 24.17 kg/m²     Physical Exam  Vitals and nursing note reviewed. Constitutional:       Appearance: Normal appearance. Cardiovascular:      Rate and Rhythm: Normal rate and regular rhythm. Heart sounds: Normal heart sounds. Pulmonary:      Effort: Pulmonary effort is normal. No respiratory distress. Breath sounds: Normal breath sounds. No rhonchi or rales. Abdominal:      General: Abdomen is flat. Bowel sounds are normal. There is no distension. Tenderness: There is no abdominal tenderness. Musculoskeletal:      Right lower leg: Edema present. Left lower leg: Edema present. Neurological:      General: No focal deficit present. Mental Status: She is alert and oriented to person, place, and time. Assessment and Plan     Diagnoses and all orders for this visit:    1. Chronic vomiting  Comments:  Resolved on zofran. Need to see what EGD shows. 2. Chronic congestive heart failure, unspecified heart failure type ([facility])  Comments:  Re-checking BMP. Follow up with Dr. Jo-Ann Kumar. No fluid overload on exam.  Orders:  -     METABOLIC PANEL, BASIC    3. Anemia, unspecified type  Comments:  Noted on more recent labs, unclear etiology- awaiting EGD and GI reccs/results given symptoms. Orders:  -     CBC WITH AUTOMATED DIFF  -     IRON PROFILE  -     FERRITIN         Medication Side Effects and Warnings were discussed with patient. Patient Labs were reviewed and or requested. Patient Past Records were reviewed and or requested.          I have discussed the diagnosis with the patient and the intended plan as seen in the above orders. The patient has received an after-visit summary and questions were answered concerning future plans. I have discussed medication side effects and warnings with the patient as well.       Batool Carson MD  [address] [place] [address]

## 2021-12-30 LAB
BASOPHILS # BLD AUTO: 0 X10E3/UL (ref 0–0.2)
BASOPHILS NFR BLD AUTO: 1 %
BUN SERPL-MCNC: 8 MG/DL (ref 8–27)
BUN/CREAT SERPL: 9 (ref 12–28)
CALCIUM SERPL-MCNC: 8.7 MG/DL (ref 8.7–10.3)
CHLORIDE SERPL-SCNC: 93 MMOL/L (ref 96–106)
CO2 SERPL-SCNC: 28 MMOL/L (ref 20–29)
CREAT SERPL-MCNC: 0.85 MG/DL (ref 0.57–1)
EOSINOPHIL # BLD AUTO: 0 X10E3/UL (ref 0–0.4)
EOSINOPHIL NFR BLD AUTO: 1 %
ERYTHROCYTE [DISTWIDTH] IN BLOOD BY AUTOMATED COUNT: 12.4 % (ref 11.7–15.4)
FERRITIN SERPL-MCNC: 169 NG/ML (ref 15–150)
GLUCOSE SERPL-MCNC: 100 MG/DL (ref 65–99)
HCT VFR BLD AUTO: 28.6 % (ref 34–46.6)
HGB BLD-MCNC: 9.4 G/DL (ref 11.1–15.9)
IMM GRANULOCYTES # BLD AUTO: 0 X10E3/UL (ref 0–0.1)
IMM GRANULOCYTES NFR BLD AUTO: 0 %
IRON SATN MFR SERPL: 10 % (ref 15–55)
IRON SERPL-MCNC: 24 UG/DL (ref 27–139)
LYMPHOCYTES # BLD AUTO: 1.3 X10E3/UL (ref 0.7–3.1)
LYMPHOCYTES NFR BLD AUTO: 33 %
MCH RBC QN AUTO: 28.4 PG (ref 26.6–33)
MCHC RBC AUTO-ENTMCNC: 32.9 G/DL (ref 31.5–35.7)
MCV RBC AUTO: 86 FL (ref 79–97)
MONOCYTES # BLD AUTO: 0.4 X10E3/UL (ref 0.1–0.9)
MONOCYTES NFR BLD AUTO: 10 %
NEUTROPHILS # BLD AUTO: 2.2 X10E3/UL (ref 1.4–7)
NEUTROPHILS NFR BLD AUTO: 55 %
PLATELET # BLD AUTO: 303 X10E3/UL (ref 150–450)
POTASSIUM SERPL-SCNC: 4.8 MMOL/L (ref 3.5–5.2)
RBC # BLD AUTO: 3.31 X10E6/UL (ref 3.77–5.28)
SODIUM SERPL-SCNC: 130 MMOL/L (ref 134–144)
TIBC SERPL-MCNC: 244 UG/DL (ref 250–450)
UIBC SERPL-MCNC: 220 UG/DL (ref 118–369)
WBC # BLD AUTO: 3.9 X10E3/UL (ref 3.4–10.8)

## 2022-01-03 NOTE — PROGRESS NOTES
Pls call patient. Labs show chronic anemia, keep GI appt for them to do more investigation given her symptoms. Other labs noted/stable.

## 2022-01-04 NOTE — PROGRESS NOTES
Called and spoke with daughter, Edison Mitchell. Patient has appointment with Cardio today. And will follow up with GI doctor.

## 2022-01-18 ENCOUNTER — TELEPHONE (OUTPATIENT)
Dept: PRIMARY CARE CLINIC | Age: 87
End: 2022-01-18

## 2022-01-18 DIAGNOSIS — I10 ESSENTIAL HYPERTENSION: ICD-10-CM

## 2022-01-18 DIAGNOSIS — I10 ESSENTIAL HYPERTENSION: Primary | ICD-10-CM

## 2022-01-18 DIAGNOSIS — R53.81 DEBILITY: ICD-10-CM

## 2022-01-18 DIAGNOSIS — R55 NEAR SYNCOPE: Primary | ICD-10-CM

## 2022-01-18 DIAGNOSIS — R55 NEAR SYNCOPE: ICD-10-CM

## 2022-01-18 NOTE — TELEPHONE ENCOUNTER
----- Message from Robby Box sent at 1/17/2022 11:28 AM EST -----  Subject: Message to Provider    QUESTIONS  Information for Provider? Patient daughter wants her Mom to get   assistance. She needs a Referral to \"[employer]\". She asked that it get   sent over to that agency. She said she has used this this company before. She stated that her Mom isnt moving around alot and needs help with her. She said OT, PT aides and ect. ..  ---------------------------------------------------------------------------  --------------  CALL BACK INFO  What is the best way for the office to contact you? OK to leave message on   voicemail  Preferred Call Back Phone Number?  [phone]  ---------------------------------------------------------------------------  --------------  SCRIPT ANSWERS  undefined

## 2022-01-20 ENCOUNTER — HOSPITAL ENCOUNTER (OUTPATIENT)
Dept: PREADMISSION TESTING | Age: 87
Discharge: HOME | End: 2022-01-20
Payer: MEDICARE

## 2022-01-20 PROCEDURE — U0005 INFEC AGEN DETEC AMPLI PROBE: HCPCS

## 2022-01-21 LAB
SARS-COV-2, XPLCVT: NOT DETECTED
SOURCE, COVRS: NORMAL

## 2022-01-24 ENCOUNTER — ANESTHESIA EVENT (OUTPATIENT)
Dept: ENDOSCOPY | Age: 87
DRG: 291 | End: 2022-01-24
Payer: MEDICARE

## 2022-01-24 ENCOUNTER — HOSPITAL ENCOUNTER (OUTPATIENT)
Age: 87
Setting detail: OUTPATIENT SURGERY
Discharge: HOME | DRG: 291 | End: 2022-01-24
Attending: INTERNAL MEDICINE | Admitting: INTERNAL MEDICINE
Payer: MEDICARE

## 2022-01-24 ENCOUNTER — APPOINTMENT (OUTPATIENT)
Dept: ENDOSCOPY | Age: 87
DRG: 291 | End: 2022-01-24
Attending: INTERNAL MEDICINE
Payer: MEDICARE

## 2022-01-24 ENCOUNTER — ANESTHESIA (OUTPATIENT)
Dept: ENDOSCOPY | Age: 87
DRG: 291 | End: 2022-01-24
Payer: MEDICARE

## 2022-01-24 VITALS
BODY MASS INDEX: 22.88 KG/M2 | DIASTOLIC BLOOD PRESSURE: 83 MMHG | HEART RATE: 90 BPM | TEMPERATURE: 97.1 F | WEIGHT: 134 LBS | HEIGHT: 64 IN | SYSTOLIC BLOOD PRESSURE: 137 MMHG | RESPIRATION RATE: 18 BRPM | OXYGEN SATURATION: 96 %

## 2022-01-24 PROCEDURE — 2709999900 HC NON-CHARGEABLE SUPPLY: Performed by: INTERNAL MEDICINE

## 2022-01-24 PROCEDURE — 74011250636 HC RX REV CODE- 250/636: Performed by: NURSE ANESTHETIST, CERTIFIED REGISTERED

## 2022-01-24 PROCEDURE — 88305 TISSUE EXAM BY PATHOLOGIST: CPT

## 2022-01-24 PROCEDURE — 76060000031 HC ANESTHESIA FIRST 0.5 HR: Performed by: INTERNAL MEDICINE

## 2022-01-24 PROCEDURE — 88312 SPECIAL STAINS GROUP 1: CPT

## 2022-01-24 PROCEDURE — 74011250636 HC RX REV CODE- 250/636: Performed by: INTERNAL MEDICINE

## 2022-01-24 PROCEDURE — 77030021593 HC FCPS BIOP ENDOSC BSC -A: Performed by: INTERNAL MEDICINE

## 2022-01-24 PROCEDURE — 76040000019: Performed by: INTERNAL MEDICINE

## 2022-01-24 PROCEDURE — 0DB58ZX EXCISION OF ESOPHAGUS, VIA NATURAL OR ARTIFICIAL OPENING ENDOSCOPIC, DIAGNOSTIC: ICD-10-PCS | Performed by: INTERNAL MEDICINE

## 2022-01-24 RX ORDER — SODIUM CHLORIDE, SODIUM LACTATE, POTASSIUM CHLORIDE, CALCIUM CHLORIDE 600; 310; 30; 20 MG/100ML; MG/100ML; MG/100ML; MG/100ML
50 INJECTION, SOLUTION INTRAVENOUS CONTINUOUS
Status: DISCONTINUED | OUTPATIENT
Start: 2022-01-24 | End: 2022-01-24 | Stop reason: HOSPADM

## 2022-01-24 RX ORDER — PROPOFOL 10 MG/ML
INJECTION, EMULSION INTRAVENOUS AS NEEDED
Status: DISCONTINUED | OUTPATIENT
Start: 2022-01-24 | End: 2022-01-24 | Stop reason: HOSPADM

## 2022-01-24 RX ORDER — SODIUM CHLORIDE, SODIUM LACTATE, POTASSIUM CHLORIDE, CALCIUM CHLORIDE 600; 310; 30; 20 MG/100ML; MG/100ML; MG/100ML; MG/100ML
INJECTION, SOLUTION INTRAVENOUS
Status: DISCONTINUED | OUTPATIENT
Start: 2022-01-24 | End: 2022-01-24 | Stop reason: HOSPADM

## 2022-01-24 RX ADMIN — PROPOFOL 20 MG: 10 INJECTION, EMULSION INTRAVENOUS at 12:18

## 2022-01-24 RX ADMIN — PROPOFOL 20 MG: 10 INJECTION, EMULSION INTRAVENOUS at 12:20

## 2022-01-24 RX ADMIN — SODIUM CHLORIDE, POTASSIUM CHLORIDE, SODIUM LACTATE AND CALCIUM CHLORIDE 50 ML/HR: 600; 310; 30; 20 INJECTION, SOLUTION INTRAVENOUS at 11:38

## 2022-01-24 RX ADMIN — SODIUM CHLORIDE, POTASSIUM CHLORIDE, SODIUM LACTATE AND CALCIUM CHLORIDE: 600; 310; 30; 20 INJECTION, SOLUTION INTRAVENOUS at 12:04

## 2022-01-24 RX ADMIN — PROPOFOL 60 MG: 10 INJECTION, EMULSION INTRAVENOUS at 12:15

## 2022-01-24 NOTE — ANESTHESIA POSTPROCEDURE EVALUATION
Procedure(s):  ESOPHAGOGASTRODUODENOSCOPY (EGD) (URGENT)  ESOPHAGOGASTRODUODENAL (EGD) BIOPSY.     total IV anesthesia, MAC    Anesthesia Post Evaluation      Multimodal analgesia: multimodal analgesia not used between 6 hours prior to anesthesia start to PACU discharge  Patient location during evaluation: bedside  Patient participation: complete - patient participated  Level of consciousness: awake and alert  Pain score: 0  Pain management: adequate  Airway patency: patent  Anesthetic complications: no  Cardiovascular status: acceptable, blood pressure returned to baseline and hemodynamically stable  Respiratory status: acceptable, nonlabored ventilation, spontaneous ventilation, unassisted and nasal cannula  Hydration status: acceptable  Post anesthesia nausea and vomiting:  none  Final Post Anesthesia Temperature Assessment:  Normothermia (36.0-37.5 degrees C)      INITIAL Post-op Vital signs:   Vitals Value Taken Time   /84 01/24/22 1230   Temp     Pulse 88 01/24/22 1230   Resp 18 01/24/22 1230   SpO2 96 % 01/24/22 1230

## 2022-01-24 NOTE — ANESTHESIA PREPROCEDURE EVALUATION
Relevant Problems   CARDIOVASCULAR   (+) Essential hypertension   (+) Sinus bradycardia       Anesthetic History   No history of anesthetic complications            Review of Systems / Medical History  Patient summary reviewed, nursing notes reviewed and pertinent labs reviewed    Pulmonary  Within defined limits                 Neuro/Psych   Within defined limits           Cardiovascular    Hypertension        Dysrhythmias : atrial fibrillation  Pacemaker, CAD and hyperlipidemia         GI/Hepatic/Renal         Renal disease: CRI      Comments: Pre-op diagnosis: CHANGE IN BOWLE HABITS, DYSPEPSIA, WEIGHT LOSS, DYSPHAGIA Endo/Other        Cancer (h/o skin cancer and uterine cancer)     Other Findings   Comments: Medical History  Hypertension  Uterine cancer ([address]  Hyperlipidemia  Skin cancer  CAD (coronary artery disease         Physical Exam    Airway  Mallampati: III  TM Distance: 4 - 6 cm  Neck ROM: normal range of motion   Mouth opening: Normal     Cardiovascular    Rhythm: regular  Rate: normal         Dental    Dentition: Bridges     Pulmonary  Breath sounds clear to auscultation               Abdominal  GI exam deferred       Other Findings            Anesthetic Plan    ASA: 3  Anesthesia type: total IV anesthesia and MAC          Induction: Intravenous  Anesthetic plan and risks discussed with: Patient and Family

## 2022-01-27 ENCOUNTER — APPOINTMENT (OUTPATIENT)
Dept: GENERAL RADIOLOGY | Age: 87
DRG: 291 | End: 2022-01-27
Attending: NURSE PRACTITIONER
Payer: MEDICARE

## 2022-01-27 ENCOUNTER — HOSPITAL ENCOUNTER (INPATIENT)
Age: 87
LOS: 4 days | Discharge: HOME | DRG: 291 | End: 2022-01-31
Attending: INTERNAL MEDICINE | Admitting: INTERNAL MEDICINE
Payer: MEDICARE

## 2022-01-27 ENCOUNTER — TELEPHONE (OUTPATIENT)
Dept: PRIMARY CARE CLINIC | Age: 87
End: 2022-01-27

## 2022-01-27 ENCOUNTER — NURSE TRIAGE (OUTPATIENT)
Dept: OTHER | Facility: CLINIC | Age: 87
End: 2022-01-27

## 2022-01-27 DIAGNOSIS — I50.9 ACUTE ON CHRONIC CONGESTIVE HEART FAILURE, UNSPECIFIED HEART FAILURE TYPE (HCC): Primary | ICD-10-CM

## 2022-01-27 LAB
ANION GAP SERPL CALC-SCNC: 7 MMOL/L (ref 5–15)
ATRIAL RATE: 93 BPM
BASOPHILS # BLD: 0 K/UL (ref 0–0.1)
BASOPHILS NFR BLD: 1 % (ref 0–1)
BNP SERPL-MCNC: ABNORMAL PG/ML
BUN SERPL-MCNC: 13 MG/DL (ref 6–20)
BUN/CREAT SERPL: 15 (ref 12–20)
CA-I BLD-MCNC: 8.9 MG/DL (ref 8.5–10.1)
CALCULATED P AXIS, ECG09: 116 DEGREES
CALCULATED R AXIS, ECG10: 139 DEGREES
CALCULATED T AXIS, ECG11: 65 DEGREES
CHLORIDE SERPL-SCNC: 93 MMOL/L (ref 97–108)
CO2 SERPL-SCNC: 28 MMOL/L (ref 21–32)
CREAT SERPL-MCNC: 0.89 MG/DL (ref 0.55–1.02)
DIAGNOSIS, 93000: NORMAL
DIFFERENTIAL METHOD BLD: ABNORMAL
EOSINOPHIL # BLD: 0 K/UL (ref 0–0.4)
EOSINOPHIL NFR BLD: 0 % (ref 0–7)
ERYTHROCYTE [DISTWIDTH] IN BLOOD BY AUTOMATED COUNT: 13.8 % (ref 11.5–14.5)
FLUAV RNA SPEC QL NAA+PROBE: NOT DETECTED
FLUBV RNA SPEC QL NAA+PROBE: NOT DETECTED
GLUCOSE SERPL-MCNC: 108 MG/DL (ref 65–100)
HCT VFR BLD AUTO: 34.4 % (ref 35–47)
HGB BLD-MCNC: 10.9 G/DL (ref 11.5–16)
IMM GRANULOCYTES # BLD AUTO: 0 K/UL (ref 0–0.04)
IMM GRANULOCYTES NFR BLD AUTO: 0 % (ref 0–0.5)
LYMPHOCYTES # BLD: 1.2 K/UL (ref 0.8–3.5)
LYMPHOCYTES NFR BLD: 39 % (ref 12–49)
MCH RBC QN AUTO: 27 PG (ref 26–34)
MCHC RBC AUTO-ENTMCNC: 31.7 G/DL (ref 30–36.5)
MCV RBC AUTO: 85.1 FL (ref 80–99)
MONOCYTES # BLD: 0.3 K/UL (ref 0–1)
MONOCYTES NFR BLD: 11 % (ref 5–13)
NEUTS SEG # BLD: 1.5 K/UL (ref 1.8–8)
NEUTS SEG NFR BLD: 49 % (ref 32–75)
P-R INTERVAL, ECG05: 194 MS
PLATELET # BLD AUTO: 239 K/UL (ref 150–400)
PMV BLD AUTO: 10.7 FL (ref 8.9–12.9)
POTASSIUM SERPL-SCNC: 4 MMOL/L (ref 3.5–5.1)
Q-T INTERVAL, ECG07: 410 MS
QRS DURATION, ECG06: 140 MS
QTC CALCULATION (BEZET), ECG08: 509 MS
RBC # BLD AUTO: 4.04 M/UL (ref 3.8–5.2)
SARS-COV-2, COV2: NOT DETECTED
SODIUM SERPL-SCNC: 128 MMOL/L (ref 136–145)
SPECIMEN SOURCE: NORMAL
TROPONIN-HIGH SENSITIVITY: 26 NG/L (ref 0–51)
VENTRICULAR RATE, ECG03: 93 BPM
WBC # BLD AUTO: 3.1 K/UL (ref 3.6–11)

## 2022-01-27 PROCEDURE — 71045 X-RAY EXAM CHEST 1 VIEW: CPT

## 2022-01-27 PROCEDURE — 74011250636 HC RX REV CODE- 250/636: Performed by: NURSE PRACTITIONER

## 2022-01-27 PROCEDURE — 65270000029 HC RM PRIVATE

## 2022-01-27 PROCEDURE — 85025 COMPLETE CBC W/AUTO DIFF WBC: CPT

## 2022-01-27 PROCEDURE — 96374 THER/PROPH/DIAG INJ IV PUSH: CPT

## 2022-01-27 PROCEDURE — 87635 SARS-COV-2 COVID-19 AMP PRB: CPT

## 2022-01-27 PROCEDURE — 99283 EMERGENCY DEPT VISIT LOW MDM: CPT

## 2022-01-27 PROCEDURE — 83880 ASSAY OF NATRIURETIC PEPTIDE: CPT

## 2022-01-27 PROCEDURE — 93005 ELECTROCARDIOGRAM TRACING: CPT

## 2022-01-27 PROCEDURE — 36415 COLL VENOUS BLD VENIPUNCTURE: CPT

## 2022-01-27 PROCEDURE — 80048 BASIC METABOLIC PNL TOTAL CA: CPT

## 2022-01-27 PROCEDURE — 87636 SARSCOV2 & INF A&B AMP PRB: CPT

## 2022-01-27 PROCEDURE — 84484 ASSAY OF TROPONIN QUANT: CPT

## 2022-01-27 RX ORDER — FUROSEMIDE 10 MG/ML
60 INJECTION INTRAMUSCULAR; INTRAVENOUS
Status: COMPLETED | OUTPATIENT
Start: 2022-01-27 | End: 2022-01-27

## 2022-01-27 RX ADMIN — FUROSEMIDE 60 MG: 10 INJECTION INTRAMUSCULAR; INTRAVENOUS at 15:58

## 2022-01-27 NOTE — ED PROVIDER NOTES
EMERGENCY DEPARTMENT HISTORY AND PHYSICAL EXAM      Date: 1/27/2022  Patient Name: Nicole Yuan    History of Presenting Illness     Chief Complaint   Patient presents with    Peripheral Edema       History Provided By: Patient    HPI: Nicole Yuan, 80 y.o. female with a past medical history significant hypertension, hyperlipidemia, obesity and CHF presents to the ED with cc of peripheral edema. Patient contacted her PCP today because she had a 3 pound weight gain overnight. Patient has also been complaining of increased peripheral edema. Patient has a history of CHF. Patient has recently been taken off her severe lactone due to possible dehydration. Since then patient has been restarted on it but is only taking it every other day. Patient concerned with the increase in weight and edema. Moderate severity, no known exacerbating or relieving factors, no other associated signs and symptoms    There are no other complaints, changes, or physical findings at this time. PCP: Pierre Lee MD    No current facility-administered medications on file prior to encounter. Current Outpatient Medications on File Prior to Encounter   Medication Sig Dispense Refill    ondansetron hcl (Zofran) 4 mg tablet Take 1 Tablet by mouth every eight (8) hours as needed for Nausea or Vomiting. 7 Tablet 0    spironolactone (ALDACTONE) 25 mg tablet Take 0.5 Tablets by mouth daily. Patient takes 12.5mg 90 Tablet 1    Eliquis 5 mg tablet       timolol (TIMOPTIC) 0.5 % ophthalmic solution Apply to surgical wound twice daily until healed 10 mL 1    cetirizine (ZYRTEC) 10 mg tablet TAKE 1 TABLET BY MOUTH EVERY DAY  11    fluticasone propionate (FLONASE) 50 mcg/actuation nasal spray USE 1 SPRAY INTO EACH NOSTRIL EVERY DAY  11    potassium chloride SR (K-TAB) 20 mEq tablet Take 1 Tab by mouth daily.  (Patient not taking: Reported on 12/8/2021) 5 Tab 0       Past History     Past Medical History:  Past Medical History:   Diagnosis Date    CAD (coronary artery disease)     ABLATION    Hyperlipidemia     Hypertension     Skin cancer     Uterine cancer ([facility] [address] 1998       Past Surgical History:  Past Surgical History:   Procedure Laterality Date    HX HYSTERECTOMY      HX PACEMAKER         Family History:  Family History   Problem Relation Age of Onset    Tuberculosis Mother     Tuberculosis Father        Social History:  Social History     Tobacco Use    Smoking status: Never Smoker    Smokeless tobacco: Never Used   Vaping Use    Vaping Use: Never used   Substance Use Topics    Alcohol use: No    Drug use: No       Allergies: Allergies   Allergen Reactions    Codeine Other (comments)    Seafood Hives     shellfish    Sulfa (Sulfonamide Antibiotics) Other (comments)     Isn't able to recall reaction at this time. Review of Systems     Review of Systems   Constitutional: Negative for chills, fatigue and fever. HENT: Negative for congestion, sinus pressure and trouble swallowing. Eyes: Negative for photophobia and pain. Respiratory: Negative for cough and shortness of breath. Cardiovascular: Positive for leg swelling. Negative for chest pain. Gastrointestinal: Negative for abdominal pain, diarrhea, nausea and vomiting. Endocrine: Negative for polydipsia, polyphagia and polyuria. Genitourinary: Negative for decreased urine volume, difficulty urinating, dysuria, hematuria and urgency. Musculoskeletal: Negative for back pain, gait problem, myalgias and neck pain. Skin: Negative for pallor and rash. Allergic/Immunologic: Negative for environmental allergies and food allergies. Neurological: Negative for dizziness, facial asymmetry, speech difficulty, numbness and headaches. Hematological: Negative for adenopathy. Does not bruise/bleed easily. Psychiatric/Behavioral: Negative for agitation, self-injury and suicidal ideas. The patient is not nervous/anxious.         Physical Exam Physical Exam  Vitals and nursing note reviewed. Constitutional:       Appearance: Normal appearance. HENT:      Head: Atraumatic. Right Ear: Tympanic membrane and external ear normal.      Left Ear: Tympanic membrane and external ear normal.      Nose: Nose normal.      Mouth/Throat:      Mouth: Mucous membranes are moist.   Eyes:      Extraocular Movements: Extraocular movements intact. Pupils: Pupils are equal, round, and reactive to light. Cardiovascular:      Rate and Rhythm: Normal rate and regular rhythm. Pulses: Normal pulses. Heart sounds: Normal heart sounds. Pulmonary:      Breath sounds: Normal breath sounds. Abdominal:      General: Abdomen is flat. Palpations: Abdomen is soft. Musculoskeletal:         General: Normal range of motion. Cervical back: Normal range of motion and neck supple. Right lower leg: 3+ Pitting Edema present. Left lower leg: 3+ Pitting Edema present. Skin:     General: Skin is warm and dry. Capillary Refill: Capillary refill takes less than 2 seconds. Neurological:      General: No focal deficit present. Mental Status: She is alert and oriented to person, place, and time. Mental status is at baseline.    Psychiatric:         Mood and Affect: Mood normal.         Behavior: Behavior normal.         Lab and Diagnostic Study Results     Labs -     Recent Results (from the past 12 hour(s))   METABOLIC PANEL, BASIC    Collection Time: 01/27/22  2:45 PM   Result Value Ref Range    Sodium 128 (L) 136 - 145 mmol/L    Potassium 4.0 3.5 - 5.1 mmol/L    Chloride 93 (L) 97 - 108 mmol/L    CO2 28 21 - 32 mmol/L    Anion gap 7 5 - 15 mmol/L    Glucose 108 (H) 65 - 100 mg/dL    BUN 13 6 - 20 mg/dL    Creatinine 0.89 0.55 - 1.02 mg/dL    BUN/Creatinine ratio 15 12 - 20      GFR est AA >60 >60 ml/min/1.73m2    GFR est non-AA 60 (L) >60 ml/min/1.73m2    Calcium 8.9 8.5 - 10.1 mg/dL   NT-PRO BNP    Collection Time: 01/27/22  2:45 PM Result Value Ref Range    NT pro-BNP 15,049 (H) <450 pg/mL   TROPONIN-HIGH SENSITIVITY    Collection Time: 01/27/22  2:45 PM   Result Value Ref Range    Troponin-High Sensitivity 26 0 - 51 ng/L   CBC WITH AUTOMATED DIFF    Collection Time: 01/27/22  2:45 PM   Result Value Ref Range    WBC 3.1 (L) 3.6 - 11.0 K/uL    RBC 4.04 3.80 - 5.20 M/uL    HGB 10.9 (L) 11.5 - 16.0 g/dL    HCT 34.4 (L) 35.0 - 47.0 %    MCV 85.1 80.0 - 99.0 FL    MCH 27.0 26.0 - 34.0 PG    MCHC 31.7 30.0 - 36.5 g/dL    RDW 13.8 11.5 - 14.5 %    PLATELET 718 637 - 841 K/uL    MPV 10.7 8.9 - 12.9 FL    NEUTROPHILS 49 32 - 75 %    LYMPHOCYTES 39 12 - 49 %    MONOCYTES 11 5 - 13 %    EOSINOPHILS 0 0 - 7 %    BASOPHILS 1 0 - 1 %    IMMATURE GRANULOCYTES 0 0.0 - 0.5 %    ABS. NEUTROPHILS 1.5 (L) 1.8 - 8.0 K/UL    ABS. LYMPHOCYTES 1.2 0.8 - 3.5 K/UL    ABS. MONOCYTES 0.3 0.0 - 1.0 K/UL    ABS. EOSINOPHILS 0.0 0.0 - 0.4 K/UL    ABS. BASOPHILS 0.0 0.0 - 0.1 K/UL    ABS. IMM. GRANS. 0.0 0.00 - 0.04 K/UL    DF AUTOMATED     COVID-19 RAPID TEST    Collection Time: 01/27/22  4:00 PM   Result Value Ref Range    Specimen source Please find results under separate order     COVID-19 WITH INFLUENZA A/B    Collection Time: 01/27/22  4:00 PM   Result Value Ref Range    SARS-CoV-2 Not Detected Not Detected      Influenza A by PCR Not Detected Not Detected      Influenza B by PCR Not Detected Not Detected         Radiologic Studies -   @[email or web address]@  CT Results  (Last 48 hours)    None        CXR Results  (Last 48 hours)               01/27/22 1503  XR CHEST PORT Final result    Impression:  Hydrostatic edema. Pleural effusions and bibasilar atelectasis. Narrative:  Chest, frontal view, 1/27/2022       History: Edema. Comparison: Including chest 12/26/2021. Findings: Left-sided pacemaker defibrillator device with 2 leads is in place. The cardiac silhouette is enlarged. The lungs are adequately expanded.     Pulmonary vascular congestion and hydrostatic edema are noted. Small appearing   pleural effusions and bibasilar atelectasis are present. Right pleural effusion   and bibasilar atelectasis appear increased as compared to chest 12/26/2021. No   pneumothorax is identified. Degenerative changes are present in the spine. Medical Decision Making   - I am the first provider for this patient. - I reviewed the vital signs, available nursing notes, past medical history, past surgical history, family history and social history. - Initial assessment performed. The patients presenting problems have been discussed, and they are in agreement with the care plan formulated and outlined with them. I have encouraged them to ask questions as they arise throughout their visit. Vital Signs-Reviewed the patient's vital signs. Patient Vitals for the past 12 hrs:   Temp Pulse Resp BP SpO2   01/27/22 1433 97.8 °F (36.6 °C) 100 18 119/78 97 %       Records Reviewed: Nursing Notes and Old Medical Records          ED Course:          Provider Notes (Medical Decision Making):   Patient presents for peripheral edema. Differential diagnosis include fluid overload, dependent edema, DVT. Patient has had a with 3 pound weight gain with a history of CHF over the past day. Patient has also been off consistent diuretics recently. Patient will be admitted for acute CHF and aggressive diuresis  MDM       Procedures   Medical Decision Makingedical Decision Making  Performed by: Rocio Pedroza NP  PROCEDURES:  Procedures       Disposition   Disposition: Admitted to Floor Stepdown Unit the case was discussed with the admitting physician     Admitted    DISCHARGE PLAN:  1. Current Discharge Medication List      CONTINUE these medications which have NOT CHANGED    Details   ondansetron hcl (Zofran) 4 mg tablet Take 1 Tablet by mouth every eight (8) hours as needed for Nausea or Vomiting.   Qty: 7 Tablet, Refills: 0      spironolactone (ALDACTONE) 25 mg tablet Take 0.5 Tablets by mouth daily. Patient takes 12.5mg  Qty: 90 Tablet, Refills: 1      Eliquis 5 mg tablet       timolol (TIMOPTIC) 0.5 % ophthalmic solution Apply to surgical wound twice daily until healed  Qty: 10 mL, Refills: 1    Associated Diagnoses: Basal cell carcinoma (BCC) of left forehead      cetirizine (ZYRTEC) 10 mg tablet TAKE 1 TABLET BY MOUTH EVERY DAY  Refills: 11      fluticasone propionate (FLONASE) 50 mcg/actuation nasal spray USE 1 SPRAY INTO EACH NOSTRIL EVERY DAY  Refills: 11      potassium chloride SR (K-TAB) 20 mEq tablet Take 1 Tab by mouth daily. Qty: 5 Tab, Refills: 0           2. Follow-up Information    None       3. Return to ED if worse   4. Current Discharge Medication List            Diagnosis     Clinical Impression:   1. Acute on chronic congestive heart failure, unspecified heart failure type [facility])        Attestations:    Karl Rodriguez NP    Please note that this dictation was completed with [employer], the [employer] voice recognition software. Quite often unanticipated grammatical, syntax, homophones, and other interpretive errors are inadvertently transcribed by the computer software. Please disregard these errors. Please excuse any errors that have escaped final proofreading. Thank you.

## 2022-01-27 NOTE — ED TRIAGE NOTES
Pt states she is experiencing increased edema on legs. States she continues to gain weight. Is on spironolactone   Called cardiologist and was told to come to ER.

## 2022-01-27 NOTE — TELEPHONE ENCOUNTER
Received call from [address] at [facility] with Red Flag Complaint. Subjective: Patient is in the room with caller. Caller(daughter) states edema bilateral feet to thighs,left leg worse 1/2-1 inch larger in diameter  on thigh    Current Symptoms: Increased SOB,mostly in the morning. Increased lower extremity edema, severe,pitting up to thighs, \"toes look like sausages\",tops of feet very swollen, can't wear shoes    Onset: 6-7 days getting worse    Associated Symptoms: NA    Pain Severity: Toes/feet feels like pins,does not rate    Temperature: Denies    What has been tried: NA    LMP: NA Pregnant: NA    Recommended disposition: Go to ED Now(or to office with PCP approval)    Care advice provided, patient verbalizes understanding; denies any other questions or concerns; instructed to call back for any new or worsening symptoms. Writer provided warm transfer to [facility] at [facility] for 2nd level triage    Attention Provider: Thank you for allowing me to participate in the care of your patient. The patient was connected to triage in response to information provided to the ECC. Please do not respond through this encounter as the response is not directed to a shared pool.     Reason for Disposition   SEVERE swelling (e.g., swelling extends above knee, entire leg is swollen, weeping fluid)    Protocols used: LEG SWELLING AND EDEMA-ADULT-OH
Doctor's office

## 2022-01-28 PROBLEM — I50.9 CHF (CONGESTIVE HEART FAILURE) (HCC): Status: ACTIVE | Noted: 2022-01-28

## 2022-01-28 LAB
AMYLASE SERPL-CCNC: 30 U/L (ref 25–115)
LIPASE SERPL-CCNC: 168 U/L (ref 73–393)
MAGNESIUM SERPL-MCNC: 1.8 MG/DL (ref 1.6–2.4)
TSH SERPL DL<=0.05 MIU/L-ACNC: 0.93 UIU/ML (ref 0.36–3.74)

## 2022-01-28 PROCEDURE — 83735 ASSAY OF MAGNESIUM: CPT

## 2022-01-28 PROCEDURE — 84443 ASSAY THYROID STIM HORMONE: CPT

## 2022-01-28 PROCEDURE — 65270000029 HC RM PRIVATE

## 2022-01-28 PROCEDURE — 74011250636 HC RX REV CODE- 250/636: Performed by: INTERNAL MEDICINE

## 2022-01-28 PROCEDURE — 83690 ASSAY OF LIPASE: CPT

## 2022-01-28 PROCEDURE — 36415 COLL VENOUS BLD VENIPUNCTURE: CPT

## 2022-01-28 PROCEDURE — 82150 ASSAY OF AMYLASE: CPT

## 2022-01-28 PROCEDURE — 74011000250 HC RX REV CODE- 250: Performed by: INTERNAL MEDICINE

## 2022-01-28 PROCEDURE — 74011250637 HC RX REV CODE- 250/637: Performed by: INTERNAL MEDICINE

## 2022-01-28 RX ORDER — SODIUM CHLORIDE 0.9 % (FLUSH) 0.9 %
5-40 SYRINGE (ML) INJECTION EVERY 8 HOURS
Status: DISCONTINUED | OUTPATIENT
Start: 2022-01-28 | End: 2022-01-31 | Stop reason: HOSPADM

## 2022-01-28 RX ORDER — ONDANSETRON 2 MG/ML
4 INJECTION INTRAMUSCULAR; INTRAVENOUS
Status: DISCONTINUED | OUTPATIENT
Start: 2022-01-28 | End: 2022-01-31 | Stop reason: HOSPADM

## 2022-01-28 RX ORDER — ACETAMINOPHEN 650 MG/1
650 SUPPOSITORY RECTAL
Status: DISCONTINUED | OUTPATIENT
Start: 2022-01-28 | End: 2022-01-31 | Stop reason: HOSPADM

## 2022-01-28 RX ORDER — SODIUM CHLORIDE 0.9 % (FLUSH) 0.9 %
5-40 SYRINGE (ML) INJECTION AS NEEDED
Status: DISCONTINUED | OUTPATIENT
Start: 2022-01-28 | End: 2022-01-31 | Stop reason: HOSPADM

## 2022-01-28 RX ORDER — POTASSIUM CHLORIDE 750 MG/1
20 TABLET, FILM COATED, EXTENDED RELEASE ORAL DAILY
Status: DISCONTINUED | OUTPATIENT
Start: 2022-01-29 | End: 2022-01-29

## 2022-01-28 RX ORDER — ACETAMINOPHEN 325 MG/1
650 TABLET ORAL
Status: DISCONTINUED | OUTPATIENT
Start: 2022-01-28 | End: 2022-01-31 | Stop reason: HOSPADM

## 2022-01-28 RX ORDER — ONDANSETRON 4 MG/1
4 TABLET, ORALLY DISINTEGRATING ORAL
Status: DISCONTINUED | OUTPATIENT
Start: 2022-01-28 | End: 2022-01-31 | Stop reason: HOSPADM

## 2022-01-28 RX ORDER — FUROSEMIDE 10 MG/ML
40 INJECTION INTRAMUSCULAR; INTRAVENOUS EVERY 12 HOURS
Status: DISCONTINUED | OUTPATIENT
Start: 2022-01-28 | End: 2022-01-30

## 2022-01-28 RX ORDER — SPIRONOLACTONE 25 MG/1
12.5 TABLET ORAL DAILY
Status: DISCONTINUED | OUTPATIENT
Start: 2022-01-29 | End: 2022-01-29

## 2022-01-28 RX ORDER — POLYETHYLENE GLYCOL 3350 17 G/17G
17 POWDER, FOR SOLUTION ORAL DAILY PRN
Status: DISCONTINUED | OUTPATIENT
Start: 2022-01-28 | End: 2022-01-31 | Stop reason: HOSPADM

## 2022-01-28 RX ADMIN — SODIUM CHLORIDE, PRESERVATIVE FREE 10 ML: 5 INJECTION INTRAVENOUS at 21:20

## 2022-01-28 RX ADMIN — APIXABAN 5 MG: 5 TABLET, FILM COATED ORAL at 21:19

## 2022-01-28 RX ADMIN — SODIUM CHLORIDE, PRESERVATIVE FREE 10 ML: 5 INJECTION INTRAVENOUS at 15:24

## 2022-01-28 RX ADMIN — FUROSEMIDE 40 MG: 10 INJECTION, SOLUTION INTRAMUSCULAR; INTRAVENOUS at 15:24

## 2022-01-28 RX ADMIN — FUROSEMIDE 40 MG: 10 INJECTION, SOLUTION INTRAMUSCULAR; INTRAVENOUS at 21:19

## 2022-01-28 NOTE — CONSULTS
Cardiology Consult    NAME: Haydee Pang   :  1933   MRN:  [id number]     Date/Time:  2022 4:09 PM    Patient PCP: Les Evans MD  ________________________________________________________________________     Assessment/Plan:     Leg edema, worsening over the past few days, has been on spironolactone, without significant improvement. Will continue diuresis. Obtain echo. Monitor electrolytes closely. Check thyroid. Chronic atrial fibrillation, anticoagulated with apixaban, status post ablation/pacemaker. Normal LV function in the past, reports patient scheduled  for follow-up echo with Dr. Yaz Eldridge. Hypertension, well controlled      Dysphagia, esophageal dysmotility, esophagitis and gastritis, started on Reglan, developed side effects, discontinued, pending follow-up with Dr. Marjorie Laws             []        High complexity decision making was performed        Subjective:   CHIEF COMPLAINT:     Leg edema   REASON FOR CONSULT:  CHF    HISTORY OF PRESENT ILLNESS:     Haydee Pang is a 80 y.o. WHITE/NON- female who is with increasing leg edema. This has been bothering her for nearly a week. Patient had been on spironolactone. This has not been working. Patient did come to the freestanding ER and was admitted. Cardiology consultation is requested. She has a history of A. fib, status post?  AV node ablation and pacemaker. Does follow-up with Dr. Yaz Eldridge. She is anticoagulated with apixaban. ? History of hypertension. Dyslipidemia. Patient is scheduled for follow-up echo with Dr. Yaz Eldridge. Patient has been with persistent vomiting. An upper endoscopy was diagnosed with mid esophagitis and gastritis as well as dysmotility. Was started on Reglan, patient could not tolerate this, she developed shaking. Pending follow-up.       Past Medical History:   Diagnosis Date    CAD (coronary artery disease)     ABLATION    Hyperlipidemia     Hypertension     Skin cancer     Uterine cancer ([facility] [address] 1998      Past Surgical History:   Procedure Laterality Date    HX HYSTERECTOMY      HX PACEMAKER       Allergies   Allergen Reactions    Codeine Other (comments)    Reglan [Metoclopramide] Other (comments)     States make her paranoid    Seafood Hives     shellfish    Sulfa (Sulfonamide Antibiotics) Other (comments)     Isn't able to recall reaction at this time. Meds:  See below  Social History     Tobacco Use    Smoking status: Never Smoker    Smokeless tobacco: Never Used   Substance Use Topics    Alcohol use: No      Family History   Problem Relation Age of Onset    Tuberculosis Mother     Tuberculosis Father        REVIEW OF SYSTEMS:     []         Unable to obtain  ROS due to ---   [x]         Total of 12 systems reviewed as follows:    Constitutional: negative fever, negative chills, negative weight loss  Eyes:   negative visual changes  ENT:   negative sore throat, tongue or lip swelling  Respiratory:  negative cough, negative dyspnea  Cards:  See HPI  GI:   negative for  diarrhea, and abdominal pain  Genitourinary: negative for frequency, dysuria  Integument:  negative for rash   Hematologic:  negative for easy bruising and gum/nose bleeding  Musculoskel: negative for myalgias,  back pain  Neurological:  negative for headaches, dizziness, vertigo, weakness  Behavl/Psych: negative for feelings of anxiety, depression     Pertinent Positives include :    Objective:      Physical Exam:    Last 24hrs VS reviewed since prior progress note. Most recent are:    Visit Vitals  /68   Pulse 92   Temp 97.8 °F (36.6 °C)   Resp 18   Ht 5' 4\" (1.626 m)   Wt 68.5 kg (151 lb)   SpO2 97%   BMI 25.92 kg/m²       Intake/Output Summary (Last 24 hours) at 1/28/2022 1609  Last data filed at 1/28/2022 1026  Gross per 24 hour   Intake 120 ml   Output 2050 ml   Net -1930 ml        General Appearance: Overweight middle-aged female, no acute distress.   Ears/Nose/Mouth/Throat: Pupils equal and round, Hearing grossly normal.  Neck: Supple. external neck veins are distended. Carotids good upstrokes, with no bruit. Chest: Lungs clear to auscultation bilaterally. Cardiovascular: Regular rate and rhythm, S1S2 normal, 2/6 systolic murmur, no rubs, gallops. Abdomen: Soft, non-tender, bowel sounds are active. No organomegaly. Extremities: 2+ edema bilaterally. Femoral pulses +2, Distal Pulses +1. Skin: Warm and dry. Neuro: Alert   []         Post-cath site without hematoma, bruit, tenderness, or thrill. Distal pulses intact. Data:      Telemetry:    EKG:  []  No new EKG for review. EKG AV sequential paced  Chest x-ray with pleural effusions and hydrostatic edema    Prior to Admission medications    Medication Sig Start Date End Date Taking? Authorizing Provider   apixaban (Eliquis) 5 mg tablet Take 5 mg by mouth two (2) times a day. Yes Provider, Historical   spironolactone (ALDACTONE) 25 mg tablet Take 0.5 Tablets by mouth daily. Patient takes 12.5mg 12/8/21  Yes Deshawn Parikh MD   cetirizine (ZYRTEC) 10 mg tablet TAKE 1 TABLET BY MOUTH EVERY DAY 6/23/19  Yes Provider, Historical   fluticasone propionate (FLONASE) 50 mcg/actuation nasal spray USE 1 SPRAY INTO EACH NOSTRIL EVERY DAY 5/14/19  Yes Provider, Historical   ondansetron hcl (Zofran) 4 mg tablet Take 1 Tablet by mouth every eight (8) hours as needed for Nausea or Vomiting. 12/29/21   Deshawn Praikh MD   Eliquis 5 mg tablet  5/18/21   Other, MD Hannah   timolol (TIMOPTIC) 0.5 % ophthalmic solution Apply to surgical wound twice daily until healed 12/13/19   Becka Smith MD   potassium chloride SR (K-TAB) 20 mEq tablet Take 1 Tab by mouth daily. Patient not taking: Reported on 12/8/2021 1/5/18   Mariam Ayala MD       No results found for this or any previous visit (from the past 24 hour(s)).      Jeffy Dewitt MD

## 2022-01-28 NOTE — PROGRESS NOTES
Dual admission skin assessment completed with Padmini-JAMEY. Buttocks reddened.  No other issues noted

## 2022-01-28 NOTE — ED NOTES
This RN went in to round on pt and update vital signs and pt had pulled out IV, pulled the monitor off, and had climbed out of the bed and was standing up in the room. This RN escorted pt back to bed, inserted new IV, and moved pt room to a room that's beside the nurse's station so she can be visualized at all times. Pt was a&ox4 at the start of shift but is now a&ox3, disoriented to place. Pt now resting in bed comfortably with cardiac monitoring in place.

## 2022-01-28 NOTE — H&P
History & Physical    Primary Care Provider: Rj Arroyo MD  Source of Information: Patient     History of Presenting Illness:   Ritika Bonilla is a 80 y.o. female with history of paroxysmal atrial fibrillation, essential hypertension and [facility] of shortness of breath. She notes progressive shortness of breath for several days with increasing lower extremity edema and 4 pound weight gain. Has been taking spironolactone at home without improvement. Chest x-ray showed increased pulmonary edema with a BNP of over 10,000. Received IV Lasix in the ED. will be admitted to telemetry floor for further diuresis       Review of Systems:  A comprehensive review of systems was negative except for that written in the History of Present Illness. Past Medical History:   Diagnosis Date    CAD (coronary artery disease)     ABLATION    Hyperlipidemia     Hypertension     Skin cancer     Uterine cancer ([facility] [address] 1998      Past Surgical History:   Procedure Laterality Date    HX HYSTERECTOMY      HX PACEMAKER       Prior to Admission medications    Medication Sig Start Date End Date Taking? Authorizing Provider   apixaban (Eliquis) 5 mg tablet Take 5 mg by mouth two (2) times a day. Yes Provider, Historical   spironolactone (ALDACTONE) 25 mg tablet Take 0.5 Tablets by mouth daily.  Patient takes 12.5mg 12/8/21  Yes Rj Arroyo MD   cetirizine (ZYRTEC) 10 mg tablet TAKE 1 TABLET BY MOUTH EVERY DAY 6/23/19  Yes Provider, Historical   fluticasone propionate (FLONASE) 50 mcg/actuation nasal spray USE 1 SPRAY INTO EACH NOSTRIL EVERY DAY 5/14/19  Yes Provider, Historical   ondansetron hcl (Zofran) 4 mg tablet Take 1 Tablet by mouth every eight (8) hours as needed for Nausea or Vomiting. 12/29/21   Rj Arroyo MD   Eliquis 5 mg tablet  5/18/21   Other, MD Hannah   timolol (TIMOPTIC) 0.5 % ophthalmic solution Apply to surgical wound twice daily until healed 12/13/19   Fred Barker MD   potassium chloride SR (K-TAB) 20 mEq tablet Take 1 Tab by mouth daily. Patient not taking: Reported on 12/8/2021 1/5/18   Anastasiia Herring MD     Allergies   Allergen Reactions    Codeine Other (comments)    Seafood Hives     shellfish    Sulfa (Sulfonamide Antibiotics) Other (comments)     Isn't able to recall reaction at this time. Family History   Problem Relation Age of Onset    Tuberculosis Mother     Tuberculosis Father         SOCIAL HISTORY:  Patient resides:  Independently    Assisted Living    SNF    With family care x      Smoking history:   None x   Former    Chronic      Alcohol history:   None x   Social    Chronic      Ambulates:   Independently x   w/cane    w/walker    w/wc    CODE STATUS:  DNR    Full x   Other      Objective:     Physical Exam:     Visit Vitals  /68   Pulse 92   Temp 97.8 °F (36.6 °C)   Resp 18   Ht 5' 4\" (1.626 m)   Wt 68.5 kg (151 lb)   SpO2 97%   BMI 25.92 kg/m²    O2 Flow Rate (L/min): 2 l/min O2 Device: None (Room air)    General:  Alert, cooperative, no distress, appears stated age. Head:  Normocephalic, without obvious abnormality, atraumatic. Eyes:  Conjunctivae/corneas clear. PERRL, EOMs intact. Nose: Nares normal. Septum midline. Mucosa normal. No drainage or sinus tenderness. Throat: Lips, mucosa, and tongue normal. Teeth and gums normal.   Neck: Supple, symmetrical, trachea midline, no adenopathy, thyroid: no enlargement/tenderness/nodules, no carotid bruit and no JVD. Back:   Symmetric, no curvature. ROM normal. No CVA tenderness. Lungs:   Clear to auscultation bilaterally. Chest wall:  No tenderness or deformity. Heart:  Regular rate and rhythm, S1, S2 normal, no murmur, click, rub or gallop. Abdomen:   Soft, non-tender. Bowel sounds normal. No masses,  No organomegaly.    Extremities: Extremities normal, atraumatic, +2 edema   Pulses: 2+ and symmetric all extremities. Skin: Skin color, texture, turgor normal. No rashes or lesions   Neurologic: CNII-XII intact. No motor or sensory deficits. EKG:  Atrial sensed ventricular Paced rhythm. Data Review:     Recent Days:  Recent Labs     01/27/22  1445   WBC 3.1*   HGB 10.9*   HCT 34.4*        Recent Labs     01/27/22  1445   *   K 4.0   CL 93*   CO2 28   *   BUN 13   CREA 0.89   CA 8.9     No results for input(s): PH, PCO2, PO2, HCO3, FIO2 in the last 72 hours. 24 Hour Results:  Recent Results (from the past 24 hour(s))   COVID-19 RAPID TEST    Collection Time: 01/27/22  4:00 PM   Result Value Ref Range    Specimen source Please find results under separate order     COVID-19 WITH INFLUENZA A/B    Collection Time: 01/27/22  4:00 PM   Result Value Ref Range    SARS-CoV-2 Not Detected Not Detected      Influenza A by PCR Not Detected Not Detected      Influenza B by PCR Not Detected Not Detected           Imaging:   XR CHEST PORT   Final Result   Hydrostatic edema. Pleural effusions and bibasilar atelectasis. Assessment:     Acute on chronic diastolic heart failure  Paroxysmal atrial fibrillation status post ablation  Status post permanent pacemaker/AICD  Hyponatremia likely due to fluid overload  Benign essential hypertension. Stable        Plan:     Admit to telemetry floor inpatient  Lasix 40 mg IV twice daily. Hold for systolic blood pressure less than 90  Fluid restriction to 1500 mL daily.   Strict input and output  Monitor daily electrolytes  Obtain PT OT evaluation  Obtain 2D echocardiogram/cardiology evaluation  Resume home dose Eliquis  GI prophylaxis  Plan of care discussed with patient's daughter  She is full code      Signed By: Min Jones MD     January 28, 2022

## 2022-01-28 NOTE — ED NOTES
Bedside shift change report given to Lilly Domingo RN (oncoming nurse) by Chas Garcia (offgoing nurse). Report included the following information SBAR, MAR and Recent Results.

## 2022-01-29 ENCOUNTER — APPOINTMENT (OUTPATIENT)
Dept: NON INVASIVE DIAGNOSTICS | Age: 87
DRG: 291 | End: 2022-01-29
Attending: INTERNAL MEDICINE
Payer: MEDICARE

## 2022-01-29 LAB
ALBUMIN SERPL-MCNC: 2.5 G/DL (ref 3.5–5)
ALBUMIN/GLOB SERPL: 0.6 {RATIO} (ref 1.1–2.2)
ALP SERPL-CCNC: 80 U/L (ref 45–117)
ALT SERPL-CCNC: 17 U/L (ref 12–78)
ANION GAP SERPL CALC-SCNC: 6 MMOL/L (ref 5–15)
AST SERPL W P-5'-P-CCNC: 23 U/L (ref 15–37)
ATRIAL RATE: 96 BPM
BILIRUB SERPL-MCNC: 0.7 MG/DL (ref 0.2–1)
BUN SERPL-MCNC: 10 MG/DL (ref 6–20)
BUN/CREAT SERPL: 13 (ref 12–20)
CA-I BLD-MCNC: 8.7 MG/DL (ref 8.5–10.1)
CALCULATED P AXIS, ECG09: 56 DEGREES
CALCULATED R AXIS, ECG10: 145 DEGREES
CALCULATED T AXIS, ECG11: 110 DEGREES
CHLORIDE SERPL-SCNC: 92 MMOL/L (ref 97–108)
CO2 SERPL-SCNC: 32 MMOL/L (ref 21–32)
CREAT SERPL-MCNC: 0.76 MG/DL (ref 0.55–1.02)
DIAGNOSIS, 93000: NORMAL
ECHO AO ROOT DIAM: 2.9 CM
ECHO AO ROOT INDEX: 1.69 CM/M2
ECHO AR MAX VEL PISA: 1.6 M/S
ECHO AV PEAK GRADIENT: 5 MMHG
ECHO AV PEAK VELOCITY: 1.1 M/S
ECHO AV REGURGITANT PHT: 338 MS
ECHO AV VELOCITY RATIO: 0.55
ECHO EST RA PRESSURE: 20 MMHG
ECHO LA DIAMETER INDEX: 2.44 CM/M2
ECHO LA DIAMETER: 4.2 CM
ECHO LA TO AORTIC ROOT RATIO: 1.45
ECHO LV FRACTIONAL SHORTENING: 4 % (ref 28–44)
ECHO LV INTERNAL DIMENSION DIASTOLE INDEX: 2.79 CM/M2
ECHO LV INTERNAL DIMENSION DIASTOLIC: 4.8 CM (ref 3.9–5.3)
ECHO LV INTERNAL DIMENSION SYSTOLIC INDEX: 2.67 CM/M2
ECHO LV INTERNAL DIMENSION SYSTOLIC: 4.6 CM
ECHO LV IVSD: 1.3 CM (ref 0.6–0.9)
ECHO LV MASS 2D: 245.7 G (ref 67–162)
ECHO LV MASS INDEX 2D: 142.9 G/M2 (ref 43–95)
ECHO LV POSTERIOR WALL DIASTOLIC: 1.3 CM (ref 0.6–0.9)
ECHO LV RELATIVE WALL THICKNESS RATIO: 0.54
ECHO LVOT PEAK GRADIENT: 1 MMHG
ECHO LVOT PEAK VELOCITY: 0.6 M/S
ECHO MV E DECELERATION TIME (DT): 155 MS
ECHO MV E VELOCITY: 1.54 M/S
ECHO PULMONARY ARTERY END DIASTOLIC PRESSURE: 13 MMHG
ECHO PV MAX VELOCITY: 0.7 M/S
ECHO PV PEAK GRADIENT: 2 MMHG
ECHO PV REGURGITANT MAX VELOCITY: 1.8 M/S
ECHO RIGHT VENTRICULAR SYSTOLIC PRESSURE (RVSP): 56 MMHG
ECHO RV INTERNAL DIMENSION: 4.8 CM
ECHO TV REGURGITANT MAX VELOCITY: 3.02 M/S
ECHO TV REGURGITANT PEAK GRADIENT: 36 MMHG
ERYTHROCYTE [DISTWIDTH] IN BLOOD BY AUTOMATED COUNT: 13.9 % (ref 11.5–14.5)
GLOBULIN SER CALC-MCNC: 4.3 G/DL (ref 2–4)
GLUCOSE SERPL-MCNC: 99 MG/DL (ref 65–100)
HCT VFR BLD AUTO: 33.6 % (ref 35–47)
HGB BLD-MCNC: 10.9 G/DL (ref 11.5–16)
MCH RBC QN AUTO: 27 PG (ref 26–34)
MCHC RBC AUTO-ENTMCNC: 32.4 G/DL (ref 30–36.5)
MCV RBC AUTO: 83.2 FL (ref 80–99)
NRBC # BLD: 0 K/UL (ref 0–0.01)
NRBC BLD-RTO: 0 PER 100 WBC
P-R INTERVAL, ECG05: 172 MS
PLATELET # BLD AUTO: 208 K/UL (ref 150–400)
PMV BLD AUTO: 10.8 FL (ref 8.9–12.9)
POTASSIUM SERPL-SCNC: 3 MMOL/L (ref 3.5–5.1)
PROT SERPL-MCNC: 6.8 G/DL (ref 6.4–8.2)
Q-T INTERVAL, ECG07: 404 MS
QRS DURATION, ECG06: 142 MS
QTC CALCULATION (BEZET), ECG08: 510 MS
RBC # BLD AUTO: 4.04 M/UL (ref 3.8–5.2)
SODIUM SERPL-SCNC: 130 MMOL/L (ref 136–145)
VENTRICULAR RATE, ECG03: 96 BPM
WBC # BLD AUTO: 2.7 K/UL (ref 3.6–11)

## 2022-01-29 PROCEDURE — 74011000250 HC RX REV CODE- 250: Performed by: INTERNAL MEDICINE

## 2022-01-29 PROCEDURE — 74011250637 HC RX REV CODE- 250/637: Performed by: INTERNAL MEDICINE

## 2022-01-29 PROCEDURE — 93005 ELECTROCARDIOGRAM TRACING: CPT

## 2022-01-29 PROCEDURE — 85027 COMPLETE CBC AUTOMATED: CPT

## 2022-01-29 PROCEDURE — 74011250636 HC RX REV CODE- 250/636: Performed by: INTERNAL MEDICINE

## 2022-01-29 PROCEDURE — 65270000029 HC RM PRIVATE

## 2022-01-29 PROCEDURE — 36415 COLL VENOUS BLD VENIPUNCTURE: CPT

## 2022-01-29 PROCEDURE — 93306 TTE W/DOPPLER COMPLETE: CPT

## 2022-01-29 PROCEDURE — 80053 COMPREHEN METABOLIC PANEL: CPT

## 2022-01-29 RX ORDER — CARVEDILOL 3.12 MG/1
3.12 TABLET ORAL 2 TIMES DAILY WITH MEALS
Status: DISCONTINUED | OUTPATIENT
Start: 2022-01-29 | End: 2022-01-31 | Stop reason: HOSPADM

## 2022-01-29 RX ORDER — POTASSIUM CHLORIDE 1.5 G/1.77G
20 POWDER, FOR SOLUTION ORAL 2 TIMES DAILY WITH MEALS
Status: COMPLETED | OUTPATIENT
Start: 2022-01-29 | End: 2022-01-30

## 2022-01-29 RX ORDER — SPIRONOLACTONE 25 MG/1
25 TABLET ORAL DAILY
Status: DISCONTINUED | OUTPATIENT
Start: 2022-01-30 | End: 2022-01-31 | Stop reason: HOSPADM

## 2022-01-29 RX ADMIN — SODIUM CHLORIDE, PRESERVATIVE FREE 10 ML: 5 INJECTION INTRAVENOUS at 14:43

## 2022-01-29 RX ADMIN — FUROSEMIDE 40 MG: 10 INJECTION, SOLUTION INTRAMUSCULAR; INTRAVENOUS at 09:01

## 2022-01-29 RX ADMIN — POTASSIUM CHLORIDE 20 MEQ: 1.5 POWDER, FOR SOLUTION ORAL at 17:24

## 2022-01-29 RX ADMIN — APIXABAN 5 MG: 5 TABLET, FILM COATED ORAL at 20:57

## 2022-01-29 RX ADMIN — CARVEDILOL 3.12 MG: 3.12 TABLET, FILM COATED ORAL at 17:24

## 2022-01-29 RX ADMIN — FUROSEMIDE 40 MG: 10 INJECTION, SOLUTION INTRAMUSCULAR; INTRAVENOUS at 20:57

## 2022-01-29 RX ADMIN — SPIRONOLACTONE 12.5 MG: 25 TABLET ORAL at 09:01

## 2022-01-29 RX ADMIN — APIXABAN 5 MG: 5 TABLET, FILM COATED ORAL at 09:00

## 2022-01-29 RX ADMIN — SODIUM CHLORIDE, PRESERVATIVE FREE 10 ML: 5 INJECTION INTRAVENOUS at 20:58

## 2022-01-29 RX ADMIN — SODIUM CHLORIDE, PRESERVATIVE FREE 10 ML: 5 INJECTION INTRAVENOUS at 05:19

## 2022-01-29 RX ADMIN — POTASSIUM CHLORIDE 20 MEQ: 750 TABLET, FILM COATED, EXTENDED RELEASE ORAL at 09:00

## 2022-01-29 RX ADMIN — ONDANSETRON 4 MG: 4 TABLET, ORALLY DISINTEGRATING ORAL at 14:45

## 2022-01-29 NOTE — PROGRESS NOTES
Reason for Admission:  Peripheral edema                     RUR Score:          9%           Plan for utilizing home health:      Politely declined     PCP: First and Last name:  Kacey Vargas MD     Name of Practice:    Are you a current patient: Yes/No: Yes   Approximate date of last visit: January 2022   Can you participate in a virtual visit with your PCP:                     Current Advanced Directive/Advance Care Plan: Full Code      Healthcare Decision Maker:   Click here to complete [address] including selection of the Healthcare Decision Maker Relationship (ie \"Primary\")                             Transition of Care Plan:                      CM met with patient at bedside to complete discharge planning assessment. Patient lives with her daughter and son in law in a two story home with steps to enter and egress. Confirmed correct home address on chart. She uses a walker for mobility and her daughter drives her to appointments. Home health services were politely declined at this time. Discharge disposition: home self care    CM team will continue to follow.

## 2022-01-29 NOTE — PROGRESS NOTES
Problem: Pressure Injury - Risk of  Goal: *Prevention of pressure injury  Description: Document Timbo Scale and appropriate interventions in the flowsheet.   Outcome: Progressing Towards Goal  Note: Pressure Injury Interventions:  Sensory Interventions: Assess changes in LOC,Minimize linen layers,Maintain/enhance activity level         Activity Interventions: PT/OT evaluation,Increase time out of bed    Mobility Interventions: PT/OT evaluation,Assess need for specialty bed    Nutrition Interventions: Offer support with meals,snacks and hydration

## 2022-01-29 NOTE — PROGRESS NOTES
Hospitalist Progress Note         Jonah Aly MD          Daily Progress Note: 1/29/2022      Subjective: The patient is seen for follow  up.  41-year-old lady admitted for acute on chronic diastolic heart failure. Seen in follow-up. Offers no complaints    Problem List:  Problem List as of 1/29/2022 Date Reviewed: 1/24/2022          Codes Class Noted - Resolved    CHF (congestive heart failure) ([facility]) ICD-10-CM: I50.9  ICD-9-CM: 428.0  1/28/2022 - Present        Acute exacerbation of CHF (congestive heart failure) ([facility] [address] ICD-10-CM: I50.9  ICD-9-CM: 428.0  1/27/2022 - Present        Sinus bradycardia ICD-10-CM: R00.1  ICD-9-CM: 427.89  1/5/2018 - Present        Near syncope ICD-10-CM: R55  ICD-9-CM: 780.2  1/5/2018 - Present        Essential hypertension ICD-10-CM: I10  ICD-9-CM: 401.9  1/4/2018 - Present              Medications reviewed  Current Facility-Administered Medications   Medication Dose Route Frequency    sodium chloride (NS) flush 5-40 mL  5-40 mL IntraVENous Q8H    sodium chloride (NS) flush 5-40 mL  5-40 mL IntraVENous PRN    acetaminophen (TYLENOL) tablet 650 mg  650 mg Oral Q6H PRN    Or    acetaminophen (TYLENOL) suppository 650 mg  650 mg Rectal Q6H PRN    polyethylene glycol (MIRALAX) packet 17 g  17 g Oral DAILY PRN    ondansetron (ZOFRAN ODT) tablet 4 mg  4 mg Oral Q8H PRN    Or    ondansetron (ZOFRAN) injection 4 mg  4 mg IntraVENous Q6H PRN    furosemide (LASIX) injection 40 mg  40 mg IntraVENous Q12H    apixaban (ELIQUIS) tablet 5 mg  5 mg Oral BID    potassium chloride SR (KLOR-CON 10) tablet 20 mEq  20 mEq Oral DAILY    spironolactone (ALDACTONE) tablet 12.5 mg  12.5 mg Oral DAILY       Review of Systems:   A comprehensive review of systems was negative except for that written in the HPI.     Objective:   Physical Exam:     Visit Vitals  /76 (BP 1 Location: Right upper arm, BP Patient Position: At rest)   Pulse 91   Temp 97.7 °F (36.5 °C)   Resp 18   Ht 5' 4\" (1.626 m)   Wt 67.1 kg (147 lb 14.9 oz)   SpO2 93%   BMI 25.39 kg/m²    O2 Flow Rate (L/min): 2 l/min O2 Device: None (Room air)    Temp (24hrs), Av.9 °F (36.6 °C), Min:97.7 °F (36.5 °C), Max:98 °F (36.7 °C)    No intake/output data recorded.  1901 -  0700  In: 12 [P.O.:238]  Out: 4650 [Urine:4650]    General:  Alert, cooperative, no distress, appears stated age. Lungs:   Clear to auscultation bilaterally. Chest wall:  No tenderness or deformity. Heart:  Regular rate and rhythm, S1, S2 normal, no murmur, click, rub or gallop. Abdomen:   Soft, non-tender. Bowel sounds normal. No masses,  No organomegaly. Extremities: Extremities normal, atraumatic, no cyanosis or edema. Pulses: 2+ and symmetric all extremities. Skin: Skin color, texture, turgor normal. No rashes or lesions   Neurologic: CNII-XII intact. No gross sensory or motor deficits     Data Review:       Recent Days:  Recent Labs     22  0732 22  1445   WBC 2.7* 3.1*   HGB 10.9* 10.9*   HCT 33.6* 34.4*    239     Recent Labs     22  0732 22  1700 22  1445   *  --  128*   K 3.0*  --  4.0   CL 92*  --  93*   CO2 32  --  28   GLU 99  --  108*   BUN 10  --  13   CREA 0.76  --  0.89   CA 8.7  --  8.9   MG  --  1.8  --    ALB 2.5*  --   --    TBILI 0.7  --   --    ALT 17  --   --      No results for input(s): PH, PCO2, PO2, HCO3, FIO2 in the last 72 hours.     24 Hour Results:  Recent Results (from the past 24 hour(s))   TSH 3RD GENERATION    Collection Time: 22  5:00 PM   Result Value Ref Range    TSH 0.93 0.36 - 3.74 uIU/mL   AMYLASE    Collection Time: 22  5:00 PM   Result Value Ref Range    Amylase 30 25 - 115 U/L   LIPASE    Collection Time: 22  5:00 PM   Result Value Ref Range    Lipase 168 73 - 393 U/L   MAGNESIUM    Collection Time: 22  5:00 PM   Result Value Ref Range    Magnesium 1.8 1.6 - 2.4 mg/dL   CBC W/O DIFF    Collection Time: 01/29/22  7:32 AM   Result Value Ref Range    WBC 2.7 (L) 3.6 - 11.0 K/uL    RBC 4.04 3.80 - 5.20 M/uL    HGB 10.9 (L) 11.5 - 16.0 g/dL    HCT 33.6 (L) 35.0 - 47.0 %    MCV 83.2 80.0 - 99.0 FL    MCH 27.0 26.0 - 34.0 PG    MCHC 32.4 30.0 - 36.5 g/dL    RDW 13.9 11.5 - 14.5 %    PLATELET 586 262 - 220 K/uL    MPV 10.8 8.9 - 12.9 FL    NRBC 0.0 0.0  WBC    ABSOLUTE NRBC 0.00 0.00 - 6.94 K/uL   METABOLIC PANEL, COMPREHENSIVE    Collection Time: 01/29/22  7:32 AM   Result Value Ref Range    Sodium 130 (L) 136 - 145 mmol/L    Potassium 3.0 (L) 3.5 - 5.1 mmol/L    Chloride 92 (L) 97 - 108 mmol/L    CO2 32 21 - 32 mmol/L    Anion gap 6 5 - 15 mmol/L    Glucose 99 65 - 100 mg/dL    BUN 10 6 - 20 mg/dL    Creatinine 0.76 0.55 - 1.02 mg/dL    BUN/Creatinine ratio 13 12 - 20      GFR est AA >60 >60 ml/min/1.73m2    GFR est non-AA >60 >60 ml/min/1.73m2    Calcium 8.7 8.5 - 10.1 mg/dL    Bilirubin, total 0.7 0.2 - 1.0 mg/dL    AST (SGOT) 23 15 - 37 U/L    ALT (SGPT) 17 12 - 78 U/L    Alk. phosphatase 80 45 - 117 U/L    Protein, total 6.8 6.4 - 8.2 g/dL    Albumin 2.5 (L) 3.5 - 5.0 g/dL    Globulin 4.3 (H) 2.0 - 4.0 g/dL    A-G Ratio 0.6 (L) 1.1 - 2.2             Assessment/     Acute on chronic diastolic heart failure  Paroxysmal atrial fibrillation status post ablation  Status post permanent pacemaker/AICD  Hyponatremia likely due to fluid overload  Benign essential hypertension. Stable         Plan:  Continue gentle diuresis with IV Lasix  Monitor Routine electrolytes  PT OT eval    Care Plan discussed with: Patient/Family    Total time spent with patient: 30 minutes.     Michaele Cranker, MD

## 2022-01-29 NOTE — PROGRESS NOTES
Progress Note      1/29/2022 2:25 PM  NAME: Ritika Bonilla   MRN:  [id number]   Admit Diagnosis: Acute exacerbation of CHF (congestive heart failure) ([facility] [I50.9]  CHF (congestive heart failure) ([facility] [I50.9]          Assessment/Plan:   Severe left ventricular systolic dysfunction, with acute decompensation, echo with ejection fraction of 20 to 25% and moderate to severe mitral regurgitation, will add beta-blockade, and eventually consider adding ACE inhibitor, continue spironolactone, continue Lasix    Hypokalemia, supplement potassium    Atrial fibrillation continue anticoagulation with apixaban    GI/esophageal dysmotility/esophagitis/gastritis, patient continues to be nauseated and unable to eat, daughter and patient would like follow-up with Dr. Lluvia Andrade         []       High complexity decision making was performed in this patient at high risk for decompensation with multiple organ involvement. Subjective:     Reita Breaker Kwabena denies chest pain, dyspnea. Discussed with RN events overnight. Review of Systems:    Symptom Y/N Comments  Symptom Y/N Comments   Fever/Chills N   Chest Pain N    Poor Appetite N   Edema N    Cough N   Abdominal Pain N    Sputum N   Joint Pain N    SOB/BALTAZAR N   Pruritis/Rash N    Nausea/vomit N   Tolerating PT/OT Y    Diarrhea N   Tolerating Diet Y    Constipation N   Other       Could NOT obtain due to:      Objective:      Physical Exam:    Last 24hrs VS reviewed since prior progress note.  Most recent are:    Visit Vitals  /76 (BP 1 Location: Right upper arm, BP Patient Position: At rest)   Pulse 96   Temp 97.2 °F (36.2 °C)   Resp 18   Ht 5' 4\" (1.626 m)   Wt 67.1 kg (147 lb 14.9 oz)   SpO2 97%   BMI 25.39 kg/m²       Intake/Output Summary (Last 24 hours) at 1/29/2022 1425  Last data filed at 1/29/2022 1158  Gross per 24 hour   Intake 596 ml   Output 3450 ml   Net -2854 ml        General Appearance: Elderly female, with nausea  Ears/Nose/Mouth/Throat: Hearing grossly normal; moist mucous membranes  Neck: Supple. Chest: Lungs clear to auscultation bilaterally. Cardiovascular: Regular rate and rhythm, S1S2 normal, no murmur. Abdomen: Soft, non-tender, bowel sounds are active. Extremities: 2+ edema bilaterally. Skin: Warm and dry. []         Post-cath site without hematoma, bruit, tenderness, or thrill. Distal pulses intact. PMH/ reviewed - no change compared to H&P    Data Review    Telemetry:     EKG:   []  No new EKG for review    Lab Data Personally Reviewed:    Recent Labs     01/29/22  0732 01/27/22  1445   WBC 2.7* 3.1*   HGB 10.9* 10.9*   HCT 33.6* 34.4*    239     No results for input(s): INR, PTP, APTT, INREXT in the last 72 hours. Recent Labs     01/29/22  0732 01/28/22  1700 01/27/22  1445   *  --  128*   K 3.0*  --  4.0   CL 92*  --  93*   CO2 32  --  28   BUN 10  --  13   CREA 0.76  --  0.89   GLU 99  --  108*   CA 8.7  --  8.9   MG  --  1.8  --      No results for input(s): CPK, CKNDX, TROIQ in the last 72 hours. No lab exists for component: CPKMB  No results found for: CHOL, CHOLX, CHLST, CHOLV, HDL, HDLP, LDL, LDLC, DLDLP, TGLX, TRIGL, TRIGP, CHHD, CHHDX    Recent Labs     01/29/22  0732 01/28/22  1700   AP 80  --    TP 6.8  --    ALB 2.5*  --    GLOB 4.3*  --    AML  --  30   LPSE  --  168     No results for input(s): PH, PCO2, PO2 in the last 72 hours.     Medications Personally Reviewed:    Current Facility-Administered Medications   Medication Dose Route Frequency    carvediloL (COREG) tablet 3.125 mg  3.125 mg Oral BID WITH MEALS    [START ON 1/30/2022] spironolactone (ALDACTONE) tablet 25 mg  25 mg Oral DAILY    potassium chloride (KLOR-CON) packet for solution 20 mEq  20 mEq Oral BID WITH MEALS    sodium chloride (NS) flush 5-40 mL  5-40 mL IntraVENous Q8H    sodium chloride (NS) flush 5-40 mL  5-40 mL IntraVENous PRN    acetaminophen (TYLENOL) tablet 650 mg  650 mg Oral Q6H PRN    Or    acetaminophen (TYLENOL) suppository 650 mg  650 mg Rectal Q6H PRN    polyethylene glycol (MIRALAX) packet 17 g  17 g Oral DAILY PRN    ondansetron (ZOFRAN ODT) tablet 4 mg  4 mg Oral Q8H PRN    Or    ondansetron (ZOFRAN) injection 4 mg  4 mg IntraVENous Q6H PRN    furosemide (LASIX) injection 40 mg  40 mg IntraVENous Q12H    apixaban (ELIQUIS) tablet 5 mg  5 mg Oral BID         Darius Yo MD

## 2022-01-30 LAB
ANION GAP SERPL CALC-SCNC: 5 MMOL/L (ref 5–15)
BUN SERPL-MCNC: 10 MG/DL (ref 6–20)
BUN/CREAT SERPL: 11 (ref 12–20)
CA-I BLD-MCNC: 8.9 MG/DL (ref 8.5–10.1)
CHLORIDE SERPL-SCNC: 88 MMOL/L (ref 97–108)
CO2 SERPL-SCNC: 33 MMOL/L (ref 21–32)
CREAT SERPL-MCNC: 0.87 MG/DL (ref 0.55–1.02)
GLUCOSE SERPL-MCNC: 126 MG/DL (ref 65–100)
POTASSIUM SERPL-SCNC: 3.2 MMOL/L (ref 3.5–5.1)
SODIUM SERPL-SCNC: 126 MMOL/L (ref 136–145)

## 2022-01-30 PROCEDURE — 80048 BASIC METABOLIC PNL TOTAL CA: CPT

## 2022-01-30 PROCEDURE — 74011000250 HC RX REV CODE- 250: Performed by: INTERNAL MEDICINE

## 2022-01-30 PROCEDURE — 74011250636 HC RX REV CODE- 250/636: Performed by: INTERNAL MEDICINE

## 2022-01-30 PROCEDURE — 74011250637 HC RX REV CODE- 250/637: Performed by: INTERNAL MEDICINE

## 2022-01-30 PROCEDURE — 36415 COLL VENOUS BLD VENIPUNCTURE: CPT

## 2022-01-30 PROCEDURE — 65270000029 HC RM PRIVATE

## 2022-01-30 RX ORDER — POTASSIUM CHLORIDE 750 MG/1
40 TABLET, FILM COATED, EXTENDED RELEASE ORAL
Status: COMPLETED | OUTPATIENT
Start: 2022-01-30 | End: 2022-01-30

## 2022-01-30 RX ORDER — LOSARTAN POTASSIUM 25 MG/1
25 TABLET ORAL DAILY
Status: DISCONTINUED | OUTPATIENT
Start: 2022-01-31 | End: 2022-01-31 | Stop reason: HOSPADM

## 2022-01-30 RX ORDER — FUROSEMIDE 10 MG/ML
40 INJECTION INTRAMUSCULAR; INTRAVENOUS DAILY
Status: DISCONTINUED | OUTPATIENT
Start: 2022-01-31 | End: 2022-01-31 | Stop reason: HOSPADM

## 2022-01-30 RX ADMIN — SODIUM CHLORIDE, PRESERVATIVE FREE 10 ML: 5 INJECTION INTRAVENOUS at 14:47

## 2022-01-30 RX ADMIN — CARVEDILOL 3.12 MG: 3.12 TABLET, FILM COATED ORAL at 16:52

## 2022-01-30 RX ADMIN — POTASSIUM CHLORIDE 40 MEQ: 750 TABLET, FILM COATED, EXTENDED RELEASE ORAL at 12:58

## 2022-01-30 RX ADMIN — APIXABAN 5 MG: 5 TABLET, FILM COATED ORAL at 21:08

## 2022-01-30 RX ADMIN — APIXABAN 5 MG: 5 TABLET, FILM COATED ORAL at 08:45

## 2022-01-30 RX ADMIN — FUROSEMIDE 40 MG: 10 INJECTION, SOLUTION INTRAMUSCULAR; INTRAVENOUS at 08:45

## 2022-01-30 RX ADMIN — SODIUM CHLORIDE, PRESERVATIVE FREE 10 ML: 5 INJECTION INTRAVENOUS at 05:07

## 2022-01-30 RX ADMIN — SPIRONOLACTONE 25 MG: 25 TABLET ORAL at 08:45

## 2022-01-30 RX ADMIN — POTASSIUM CHLORIDE 20 MEQ: 1.5 POWDER, FOR SOLUTION ORAL at 08:45

## 2022-01-30 RX ADMIN — CARVEDILOL 3.12 MG: 3.12 TABLET, FILM COATED ORAL at 08:45

## 2022-01-30 NOTE — PROGRESS NOTES
Hospitalist Progress Note         Michael Ceballos MD          Daily Progress Note: 1/30/2022      Subjective: The patient is seen for follow  up.  70-year-old lady admitted for acute on chronic diastolic heart failure. Seen in follow-up. Offers no complaints    Problem List:  Problem List as of 1/30/2022 Date Reviewed: 1/24/2022          Codes Class Noted - Resolved    CHF (congestive heart failure) ([facility]) ICD-10-CM: I50.9  ICD-9-CM: 428.0  1/28/2022 - Present        Acute exacerbation of CHF (congestive heart failure) ([facility] [address] ICD-10-CM: I50.9  ICD-9-CM: 428.0  1/27/2022 - Present        Sinus bradycardia ICD-10-CM: R00.1  ICD-9-CM: 427.89  1/5/2018 - Present        Near syncope ICD-10-CM: R55  ICD-9-CM: 780.2  1/5/2018 - Present        Essential hypertension ICD-10-CM: I10  ICD-9-CM: 401.9  1/4/2018 - Present              Medications reviewed  Current Facility-Administered Medications   Medication Dose Route Frequency    carvediloL (COREG) tablet 3.125 mg  3.125 mg Oral BID WITH MEALS    spironolactone (ALDACTONE) tablet 25 mg  25 mg Oral DAILY    sodium chloride (NS) flush 5-40 mL  5-40 mL IntraVENous Q8H    sodium chloride (NS) flush 5-40 mL  5-40 mL IntraVENous PRN    acetaminophen (TYLENOL) tablet 650 mg  650 mg Oral Q6H PRN    Or    acetaminophen (TYLENOL) suppository 650 mg  650 mg Rectal Q6H PRN    polyethylene glycol (MIRALAX) packet 17 g  17 g Oral DAILY PRN    ondansetron (ZOFRAN ODT) tablet 4 mg  4 mg Oral Q8H PRN    Or    ondansetron (ZOFRAN) injection 4 mg  4 mg IntraVENous Q6H PRN    furosemide (LASIX) injection 40 mg  40 mg IntraVENous Q12H    apixaban (ELIQUIS) tablet 5 mg  5 mg Oral BID       Review of Systems:   A comprehensive review of systems was negative except for that written in the HPI.     Objective:   Physical Exam:     Visit Vitals  /76 (BP 1 Location: Right upper arm, BP Patient Position: At rest)   Pulse (!) 103   Temp 97.3 °F (36.3 °C)   Resp 18   Ht 5' 4\" (1.626 m)   Wt 58.7 kg (129 lb 6.6 oz)   SpO2 92%   BMI 22.21 kg/m²    O2 Flow Rate (L/min): 2 l/min O2 Device: None (Room air)    Temp (24hrs), Av.6 °F (36.4 °C), Min:97.2 °F (36.2 °C), Max:97.9 °F (36.6 °C)    No intake/output data recorded.  1901 -  0700  In: 896 [P.O.:896]  Out: 2600 [Urine:2600]    General:  Alert, cooperative, no distress, appears stated age. Lungs:   Clear to auscultation bilaterally. Chest wall:  No tenderness or deformity. Heart:  Regular rate and rhythm, S1, S2 normal, no murmur, click, rub or gallop. Abdomen:   Soft, non-tender. Bowel sounds normal. No masses,  No organomegaly. Extremities: Extremities normal, atraumatic, no cyanosis or edema. Pulses: 2+ and symmetric all extremities. Skin: Skin color, texture, turgor normal. No rashes or lesions   Neurologic: CNII-XII intact. No gross sensory or motor deficits     Data Review:       Recent Days:  Recent Labs     22  0732 22  1445   WBC 2.7* 3.1*   HGB 10.9* 10.9*   HCT 33.6* 34.4*    239     Recent Labs     22  0854 22  0732 22  1700 22  1445   * 130*  --  128*   K 3.2* 3.0*  --  4.0   CL 88* 92*  --  93*   CO2 33* 32  --  28   * 99  --  108*   BUN 10 10  --  13   CREA 0.87 0.76  --  0.89   CA 8.9 8.7  --  8.9   MG  --   --  1.8  --    ALB  --  2.5*  --   --    TBILI  --  0.7  --   --    ALT  --  17  --   --      No results for input(s): PH, PCO2, PO2, HCO3, FIO2 in the last 72 hours.     24 Hour Results:  Recent Results (from the past 24 hour(s))   ECHO ADULT COMPLETE    Collection Time: 22 11:16 AM   Result Value Ref Range    AR .0 ms    AR Max Velocity PISA 1.6 m/s    AV Peak Velocity 1.1 m/s    AV Peak Gradient 5 mmHg    Aortic Root 2.9 cm    IVSd 1.3 (A) 0.6 - 0.9 cm    LVIDd 4.8 3.9 - 5.3 cm    LVIDs 4.6 cm    LVOT Peak Velocity 0.6 m/s    LVOT Peak Gradient 1 mmHg    LVPWd 1.3 (A) 0.6 - 0.9 cm    LA Diameter 4.2 cm    MV E Wave Deceleration Time 155.0 ms    MV E Velocity 1.54 m/s    CT Max Velocity 1.8 m/s    Pulmonary Artery EDP 13 mmHg    PV Max Velocity 0.7 m/s    PV Peak Gradient 2 mmHg    Est. RA Pressure 20 mmHg    RVIDd 4.8 cm    TR Max Velocity 3.02 m/s    TR Peak Gradient 36 mmHg    Fractional Shortening 2D 4 28 - 44 %    LVIDd Index 2.79 cm/m2    LVIDs Index 2.67 cm/m2    LV RWT Ratio 0.54     LV Mass 2D 245.7 (A) 67 - 162 g    LV Mass 2D Index 142.9 (A) 43 - 95 g/m2    LA Size Index 2.44 cm/m2    LA/AO Root Ratio 1.45     Ao Root Index 1.69 cm/m2    AV Velocity Ratio 0.55     RVSP 56 mmHg   METABOLIC PANEL, BASIC    Collection Time: 01/30/22  8:54 AM   Result Value Ref Range    Sodium 126 (L) 136 - 145 mmol/L    Potassium 3.2 (L) 3.5 - 5.1 mmol/L    Chloride 88 (L) 97 - 108 mmol/L    CO2 33 (H) 21 - 32 mmol/L    Anion gap 5 5 - 15 mmol/L    Glucose 126 (H) 65 - 100 mg/dL    BUN 10 6 - 20 mg/dL    Creatinine 0.87 0.55 - 1.02 mg/dL    BUN/Creatinine ratio 11 (L) 12 - 20      GFR est AA >60 >60 ml/min/1.73m2    GFR est non-AA >60 >60 ml/min/1.73m2    Calcium 8.9 8.5 - 10.1 mg/dL           Assessment/     Acute on chronic diastolic heart failure  Paroxysmal atrial fibrillation status post ablation  Status post permanent pacemaker/AICD  Hyponatremia likely due to fluid overload  Benign essential hypertension. Stable         Plan:  Continue gentle diuresis with IV Lasix  Monitor Routine electrolytes  PT OT eval  Anticipate discharge to home tomorrow    Care Plan discussed with: Patient/Family    Total time spent with patient: 30 minutes.     Jimmy Gallardo MD

## 2022-01-30 NOTE — PROGRESS NOTES
Pt alert and oriented at this time. Family at the bed side. Pt voice no c/o's. No distress noted. Pt has been eating today and has been able to tolerated. She stated that she feels better. Will continue to monitor.

## 2022-01-30 NOTE — PROGRESS NOTES
Progress Note      1/30/2022 2:25 PM  NAME: Patricia Stevenson   MRN:  [id number]   Admit Diagnosis: Acute exacerbation of CHF (congestive heart failure) ([facility] [I50.9]  CHF (congestive heart failure) ([facility] [I50.9]          Assessment/Plan:   Severe left ventricular systolic dysfunction, with acute decompensation, echo with ejection fraction of 20 to 25% and moderate to severe mitral regurgitation, on carvedilol, will add losartan today, continue spironolactone, continue Lasix    Hypokalemia, supplement potassium. Recheck kidney function and electrolytes in a.m. Atrial fibrillation  S/p PPM continue anticoagulation with apixaban. GI/esophageal dysmotility/esophagitis/gastritis, patient does report nausea is better    Hyponatremia, sodium is decreasing. Continue to monitor         []       High complexity decision making was performed in this patient at high risk for decompensation with multiple organ involvement. Subjective:     Ingrid Benjamindianne Kwabena denies chest pain, dyspnea. Nausea is better  Discussed with RN events overnight. Review of Systems:    Symptom Y/N Comments  Symptom Y/N Comments   Fever/Chills N   Chest Pain N    Poor Appetite N   Edema N    Cough N   Abdominal Pain N    Sputum N   Joint Pain N    SOB/BALTAZAR N   Pruritis/Rash N    Nausea/vomit N   Tolerating PT/OT Y    Diarrhea N   Tolerating Diet Y    Constipation N   Other       Could NOT obtain due to:      Objective:      Physical Exam:    Last 24hrs VS reviewed since prior progress note.  Most recent are:    Visit Vitals  /63 (BP 1 Location: Right upper arm, BP Patient Position: At rest)   Pulse 61   Temp 98 °F (36.7 °C)   Resp 18   Ht 5' 4\" (1.626 m)   Wt 58.7 kg (129 lb 6.6 oz)   SpO2 90%   BMI 22.21 kg/m²       Intake/Output Summary (Last 24 hours) at 1/30/2022 1147  Last data filed at 1/30/2022 1032  Gross per 24 hour   Intake 1020 ml   Output 850 ml   Net 170 ml        General Appearance: Elderly female, in no distress. Ears/Nose/Mouth/Throat: Hearing grossly normal; moist mucous membranes  Neck: Supple. Chest: Lungs clear to auscultation bilaterally. Cardiovascular: Regular rate and rhythm, S1S2 normal, no murmur. Abdomen: Soft, non-tender, bowel sounds are active. Extremities: 1+ edema bilaterally. Skin: Warm and dry. []         Post-cath site without hematoma, bruit, tenderness, or thrill. Distal pulses intact. PMH/ reviewed - no change compared to H&P    Data Review    Telemetry:     EKG:   []  No new EKG for review    Lab Data Personally Reviewed:    Recent Labs     01/29/22  0732 01/27/22  1445   WBC 2.7* 3.1*   HGB 10.9* 10.9*   HCT 33.6* 34.4*    239     No results for input(s): INR, PTP, APTT, INREXT, INREXT in the last 72 hours. Recent Labs     01/30/22  0854 01/29/22  0732 01/28/22  1700 01/27/22  1445   * 130*  --  128*   K 3.2* 3.0*  --  4.0   CL 88* 92*  --  93*   CO2 33* 32  --  28   BUN 10 10  --  13   CREA 0.87 0.76  --  0.89   * 99  --  108*   CA 8.9 8.7  --  8.9   MG  --   --  1.8  --      No results for input(s): CPK, CKNDX, TROIQ in the last 72 hours. No lab exists for component: CPKMB  No results found for: CHOL, CHOLX, CHLST, CHOLV, HDL, HDLP, LDL, LDLC, DLDLP, TGLX, TRIGL, TRIGP, CHHD, CHHDX    Recent Labs     01/29/22  0732 01/28/22  1700   AP 80  --    TP 6.8  --    ALB 2.5*  --    GLOB 4.3*  --    AML  --  30   LPSE  --  168     No results for input(s): PH, PCO2, PO2 in the last 72 hours.     Medications Personally Reviewed:    Current Facility-Administered Medications   Medication Dose Route Frequency    [START ON 1/31/2022] losartan (COZAAR) tablet 25 mg  25 mg Oral DAILY    potassium chloride SR (KLOR-CON 10) tablet 40 mEq  40 mEq Oral NOW    carvediloL (COREG) tablet 3.125 mg  3.125 mg Oral BID WITH MEALS    spironolactone (ALDACTONE) tablet 25 mg  25 mg Oral DAILY    sodium chloride (NS) flush 5-40 mL  5-40 mL IntraVENous Q8H    sodium chloride (NS) flush 5-40 mL  5-40 mL IntraVENous PRN    acetaminophen (TYLENOL) tablet 650 mg  650 mg Oral Q6H PRN    Or    acetaminophen (TYLENOL) suppository 650 mg  650 mg Rectal Q6H PRN    polyethylene glycol (MIRALAX) packet 17 g  17 g Oral DAILY PRN    ondansetron (ZOFRAN ODT) tablet 4 mg  4 mg Oral Q8H PRN    Or    ondansetron (ZOFRAN) injection 4 mg  4 mg IntraVENous Q6H PRN    furosemide (LASIX) injection 40 mg  40 mg IntraVENous Q12H    apixaban (ELIQUIS) tablet 5 mg  5 mg Oral BID         Jeffy Dewitt MD

## 2022-01-31 VITALS
HEIGHT: 64 IN | SYSTOLIC BLOOD PRESSURE: 117 MMHG | BODY MASS INDEX: 22.09 KG/M2 | DIASTOLIC BLOOD PRESSURE: 67 MMHG | TEMPERATURE: 98.8 F | HEART RATE: 86 BPM | WEIGHT: 129.41 LBS | RESPIRATION RATE: 18 BRPM | OXYGEN SATURATION: 94 %

## 2022-01-31 PROCEDURE — 97161 PT EVAL LOW COMPLEX 20 MIN: CPT

## 2022-01-31 PROCEDURE — 74011000250 HC RX REV CODE- 250: Performed by: INTERNAL MEDICINE

## 2022-01-31 PROCEDURE — 74011250637 HC RX REV CODE- 250/637: Performed by: INTERNAL MEDICINE

## 2022-01-31 PROCEDURE — 74011250636 HC RX REV CODE- 250/636: Performed by: INTERNAL MEDICINE

## 2022-01-31 PROCEDURE — 97530 THERAPEUTIC ACTIVITIES: CPT

## 2022-01-31 RX ORDER — LOSARTAN POTASSIUM 25 MG/1
25 TABLET ORAL DAILY
Qty: 30 TABLET | Refills: 0 | Status: SHIPPED | OUTPATIENT
Start: 2022-02-01 | End: 2022-02-09

## 2022-01-31 RX ORDER — CARVEDILOL 3.12 MG/1
3.12 TABLET ORAL 2 TIMES DAILY WITH MEALS
Qty: 60 TABLET | Refills: 0 | Status: SHIPPED | OUTPATIENT
Start: 2022-01-31 | End: 2022-03-02

## 2022-01-31 RX ORDER — FUROSEMIDE 40 MG/1
40 TABLET ORAL 2 TIMES DAILY
Qty: 60 TABLET | Refills: 0 | Status: SHIPPED | OUTPATIENT
Start: 2022-01-31 | End: 2022-03-02

## 2022-01-31 RX ORDER — POTASSIUM CHLORIDE 1500 MG/1
20 TABLET, FILM COATED, EXTENDED RELEASE ORAL DAILY
Qty: 7 TABLET | Refills: 0 | Status: SHIPPED | OUTPATIENT
Start: 2022-01-31 | End: 2022-02-07

## 2022-01-31 RX ORDER — POTASSIUM CHLORIDE 1.5 G/1.77G
40 POWDER, FOR SOLUTION ORAL
Status: COMPLETED | OUTPATIENT
Start: 2022-01-31 | End: 2022-01-31

## 2022-01-31 RX ADMIN — CARVEDILOL 3.12 MG: 3.12 TABLET, FILM COATED ORAL at 09:46

## 2022-01-31 RX ADMIN — SODIUM CHLORIDE, PRESERVATIVE FREE 10 ML: 5 INJECTION INTRAVENOUS at 06:45

## 2022-01-31 RX ADMIN — SODIUM CHLORIDE, PRESERVATIVE FREE 10 ML: 5 INJECTION INTRAVENOUS at 06:43

## 2022-01-31 RX ADMIN — APIXABAN 5 MG: 5 TABLET, FILM COATED ORAL at 09:46

## 2022-01-31 RX ADMIN — SPIRONOLACTONE 25 MG: 25 TABLET ORAL at 09:46

## 2022-01-31 RX ADMIN — FUROSEMIDE 40 MG: 10 INJECTION, SOLUTION INTRAMUSCULAR; INTRAVENOUS at 09:46

## 2022-01-31 RX ADMIN — LOSARTAN POTASSIUM 25 MG: 25 TABLET, FILM COATED ORAL at 09:46

## 2022-01-31 RX ADMIN — POTASSIUM CHLORIDE 40 MEQ: 1.5 POWDER, FOR SOLUTION ORAL at 09:46

## 2022-01-31 NOTE — PROGRESS NOTES
Progress Note    Patient: Lynn Godfrey MRN: [id number]  SSN: [id number]    YOB: 1933  Age: 80 y.o.   Sex: female      Admit Date: 1/27/2022    LOS: 4 days     Subjective:   Patient examined, confused, less dysphagia ate some breakfast,    Past Medical History:   Diagnosis Date    CAD (coronary artery disease)     ABLATION    Hyperlipidemia     Hypertension     Skin cancer     Uterine cancer ([facility] 1998        Current Facility-Administered Medications:     losartan (COZAAR) tablet 25 mg, 25 mg, Oral, DAILY, Viry Tafoya MD, 25 mg at 01/31/22 0946    furosemide (LASIX) injection 40 mg, 40 mg, IntraVENous, DAILY, Viry Tafoya MD, 40 mg at 01/31/22 0946    carvediloL (COREG) tablet 3.125 mg, 3.125 mg, Oral, BID WITH MEALS, Viry Tafoya MD, 3.125 mg at 01/31/22 3408    spironolactone (ALDACTONE) tablet 25 mg, 25 mg, Oral, DAILY, Viry Tafoya MD, 25 mg at 01/31/22 0946    sodium chloride (NS) flush 5-40 mL, 5-40 mL, IntraVENous, Q8H, Rosalba Díaz MD, 10 mL at 01/31/22 0645    sodium chloride (NS) flush 5-40 mL, 5-40 mL, IntraVENous, PRN, Rosalba Díaz MD    acetaminophen (TYLENOL) tablet 650 mg, 650 mg, Oral, Q6H PRN **OR** acetaminophen (TYLENOL) suppository 650 mg, 650 mg, Rectal, Q6H PRN, Rosalba Díaz MD    polyethylene glycol (MIRALAX) packet 17 g, 17 g, Oral, DAILY PRN, Rosalba Díaz MD    ondansetron (ZOFRAN ODT) tablet 4 mg, 4 mg, Oral, Q8H PRN, 4 mg at 01/29/22 1445 **OR** ondansetron (ZOFRAN) injection 4 mg, 4 mg, IntraVENous, Q6H PRN, Rosalba Díaz MD    apixaban (ELIQUIS) tablet 5 mg, 5 mg, Oral, BID, Rosalba Díaz MD, 5 mg at 01/31/22 0946    Objective:     Vitals:    01/30/22 2100 01/31/22 0314 01/31/22 0735 01/31/22 1106   BP: 103/64 124/76 128/74 110/71   Pulse: 86 92 95 89   Resp: 18 18 20 18   Temp: 97.3 °F (36.3 °C) 97.6 °F (36.4 °C) 97.8 °F (36.6 °C) 97.8 °F (36.6 °C)   SpO2: 92% 96% 95% 95%   Weight:       Height: Intake and Output:  Current Shift: No intake/output data recorded. Last three shifts: 01/29 1901 - 01/31 0700  In: 1080 [P.O.:1080]  Out: 450 [Urine:450]    Physical Exam:   Physical Exam  HENT:      Head: Atraumatic. Mouth/Throat:      Mouth: Mucous membranes are dry. Eyes:      Extraocular Movements: Extraocular movements intact. Cardiovascular:      Rate and Rhythm: Normal rate. Pulses: Normal pulses. Pulmonary:      Effort: Pulmonary effort is normal.   Abdominal:      General: Abdomen is flat. Bowel sounds are normal.   Musculoskeletal:      Cervical back: Normal range of motion. Skin:     General: Skin is dry. Neurological:      General: No focal deficit present. Lab/Data Review:  No results found for this or any previous visit (from the past 24 hour(s)). XR CHEST PORT   Final Result   Hydrostatic edema. Pleural effusions and bibasilar atelectasis. Assessment:     Active Problems:    Acute exacerbation of CHF (congestive heart failure) ([facility] (1/27/2022)      CHF (congestive heart failure) ([facility] (1/28/2022)       Shortness of breath, CHF,  Dysphagia, secondary to esophageal dysmotility, esophagitis,      no esophageal stricture,     Bx: Of esophagu  The squamous epithelium is thin, with some intercellular edema of the   basal cells.  There is no significant inflammation and no eosinophilia. There is no evidence of dysplasia or neoplasm.  A PAS stain was performed,   showing no fungal organisms.  The control shows appropriate staining.      No GI bleeding,    Plan:   Continue anticoagulations  Diuretic as ordered     May try to pro motilty agent  drugs as outpt, there was questionable history of allergy to Reglan, may try erythromycin           Signed By: Mary Jo Stuart MD     January 31, 2022        Thank you for allowing me to participate in this patients care  [facility] Referring Physician   Kacey Vargas MD

## 2022-01-31 NOTE — DISCHARGE INSTRUCTIONS
Patient Education        Learning About ARBs  Introduction     ARBs (angiotensin II receptor blockers) block a hormone that makes blood vessels narrow. As a result, the blood vessels relax and widen. This lowers blood pressure. ARBs also put more water and salt into the urine. This also lowers blood pressure. ARBs can treat:  · High blood pressure. · Coronary artery disease. · Heart failure. They also may be used to help your kidneys when you have diabetes. Examples  · candesartan (Atacand)  · irbesartan (Avapro)  · losartan (Cozaar)  · olmesartan (Benicar)  · valsartan (Diovan)  This is not a complete list of all ARBs. Possible side effects  Side effects may include:  · Low blood pressure. You may feel dizzy and weak. · High potassium levels. You may have other side effects or reactions not listed here. Check the information that comes with your medicine. What to know about taking this medicine  · ARBs may be used if you had a cough when you tried to take an ACE inhibitor. ARBs are less likely to cause a cough. · You may need regular blood tests. · Take your medicines exactly as prescribed. Call your doctor if you think you are having a problem with your medicine. · Tell your doctor or pharmacist all the medicines you take. This includes over-the-counter medicines, vitamins, herbal products, and supplements. Taking some medicines together can cause problems. · You should not take ARBs if you are pregnant or planning to become pregnant. Where can you learn more? Go to http://www.gray.com/  Enter K212 in the search box to learn more about \"Learning About ARBs. \"  Current as of: April 29, 2021               Content Version: 13.0  © 9353-3528 Software 2000. Care instructions adapted under license by [employer] (which disclaims liability or warranty for this information).  If you have questions about a medical condition or this instruction, always ask your healthcare professional. [place] [address] any warranty or liability for your use of this information. Patient Education        Fluid Restriction: Care Instructions  Your Care Instructions     A buildup of fluid in the body can cause low sodium levels in the blood. It may also cause symptoms such as swelling and pain. Your doctor may suggest that you limit liquids, including foods that contain a lot of liquid. Limiting liquids is called fluid restriction. Keeping track of the amount of fluids you take in may help you feel better. Your doctor will tell you how much fluid you can have in a day. Follow-up care is a key part of your treatment and safety. Be sure to make and go to all appointments, and call your doctor if you are having problems. It's also a good idea to know your test results and keep a list of the medicines you take. How can you care for yourself at home? · Find a way of tracking the fluids you take in that works for you. Here are two methods you can try:  ? Write down how much you drink throughout the day. ? Keep a container filled with the amount of liquid allowed for the day. As you drink liquids during the day, such as a 6-ounce cup of coffee, pour that same amount out of the container. When the container is empty, you've had your liquid for the day. · Count any foods that will melt (such as ice cream, gelatin, or flavored ice treats) or liquid foods (such as soup) as part of your fluids for the day. Also count the liquid in canned fruits and vegetables as part of your daily intake, or drain them well before serving. · Space your liquids throughout the day. Then you won't be tempted to drink more than the amount your doctor recommends. · To relieve thirst without taking in extra water, try chewing gum, sucking on hard candy (sugarless if you have diabetes), or rinsing your mouth with water and spitting it out. Where can you learn more?   Go to http://www.Repair [employer]/  Enter O823 in the search box to learn more about \"Fluid Restriction: Care Instructions. \"  Current as of: April 29, 2021               Content Version: 13.0  © 4998-0288 [employer]. Care instructions adapted under license by [employer] (which disclaims liability or warranty for this information). If you have questions about a medical condition or this instruction, always ask your healthcare professional. [place] [address] any warranty or liability for your use of this information. Patient Education        Learning About Heart Failure Zones  What are heart failure zones? Heart failure zones give you an easy way to see changes in your heart failure symptoms. They also tell you when you need to get help. Check every day to see which zone you are in. Green zone. You are doing well. This is where you want to be. · Your weight is stable. It's not going up or down. · You breathe easily. · You are sleeping well. You are able to lie flat without shortness of breath. · You can do your usual activities. Yellow zone. Be careful. Your symptoms are changing. Call your doctor. · You have new or increased shortness of breath. · You are dizzy or lightheaded, or you feel like you may faint. · You have sudden weight gain, such as more than 2 to 3 pounds in a day or 5 pounds in a week. (Your doctor may suggest a different range of weight gain.)  · You have increased swelling in your legs, ankles, or feet. · You are so tired or weak that you can't do your usual activities. · You are not sleeping well. Shortness of breath wakes you up at night. You need extra pillows. Red zone. This is an emergency. Call 911. You have symptoms of sudden heart failure. For example:  · You have severe trouble breathing. · You cough up pink, foamy mucus. · You have a new irregular or fast heartbeat. You have symptoms of a heart attack. These may include:  · Chest pain or pressure, or a strange feeling in the chest.  · Sweating. · Shortness of breath. · Nausea or vomiting. · Pain, pressure, or a strange feeling in the back, neck, jaw, or upper belly or in one or both shoulders or arms. · Lightheadedness or sudden weakness. · A fast or irregular heartbeat. If you have symptoms of a heart attack: After you call 911, the  may tell you to chew 1 adult-strength or 2 to 4 low-dose aspirin. Wait for an ambulance. Do not try to drive yourself. Follow-up care is a key part of your treatment and safety. Be sure to make and go to all appointments, and call your doctor if you are having problems. It's also a good idea to know your test results and keep a list of the medicines you take. Where can you learn more? Go to http://www.gray.com/  Enter T174 in the search box to learn more about \"Learning About Heart Failure Zones. \"  Current as of: April 29, 2021               Content Version: 13.0  © 2266-8346 [employer]. Care instructions adapted under license by [employer] (which disclaims liability or warranty for this information). If you have questions about a medical condition or this instruction, always ask your healthcare professional. [place] [address] any warranty or liability for your use of this information. Patient Education        Avoiding Triggers With Heart Failure: Care Instructions  Your Care Instructions     Triggers are anything that make your heart failure flare up. A flare-up is also called \"sudden heart failure\" or \"acute heart failure. \" When you have a flare-up, fluid builds up in your lungs, and you have problems breathing. You might need to go to the hospital. By watching for changes in your condition and avoiding triggers, you can prevent heart failure flare-ups. Follow-up care is a key part of your treatment and safety.  Be sure to make and go to all appointments, and call your doctor if you are having problems. It's also a good idea to know your test results and keep a list of the medicines you take. How can you care for yourself at home? Watch for changes in your weight and condition  · Weigh yourself without clothing at the same time each day. Record your weight. Call your doctor if you have sudden weight gain, such as more than 2 to 3 pounds in a day or 5 pounds in a week. (Your doctor may suggest a different range of weight gain.) A sudden weight gain may mean that your heart failure is getting worse. · Keep a daily record of your symptoms. Write down any changes in how you feel, such as new shortness of breath, cough, or problems eating. Also record if your ankles are more swollen than usual and if you feel more tired than usual. Note anything that you ate or did that could have triggered these changes. Limit sodium  Sodium causes your body to hold on to extra water. This may cause your heart failure symptoms to get worse. People get most of their sodium from processed foods. Fast food and restaurant meals also tend to be very high in sodium. · Your doctor may suggest that you limit sodium. Your doctor can tell you how much sodium is right for you. This includes limiting sodium in cooked and packaged foods. · Read food labels on cans and food packages. They tell you how much sodium you get in one serving. Check the serving size. If you eat more than one serving, you are getting more sodium. · Be aware that sodium can come in forms other than salt, including monosodium glutamate (MSG), sodium citrate, and sodium bicarbonate (baking soda). MSG is often added to Asian food. You can sometimes ask for food without MSG or salt. · Slowly reducing salt will help you adjust to the taste. Take the salt shaker off the table. · Flavor your food with garlic, lemon juice, onion, vinegar, herbs, and spices instead of salt.  Do not use soy sauce, steak sauce, onion salt, garlic salt, mustard, or ketchup on your food, unless it is labeled \"low-sodium\" or \"low-salt. \"  · Make your own salad dressings, sauces, and ketchup without adding salt. · Use fresh or frozen ingredients, instead of canned ones, whenever you can. Choose low-sodium canned goods. · Eat less processed food and food from restaurants, including fast food. Exercise as directed  Moderate, regular exercise is very good for your heart. It improves your blood flow and helps control your weight. But too much exercise can stress your heart and cause a heart failure flare-up. · Check with your doctor before you start an exercise program.  · Walking is an easy way to get exercise. Start out slowly. Gradually increase the length and pace of your walk. Swimming, riding a bike, and using a treadmill are also good forms of exercise. · When you exercise, watch for signs that your heart is working too hard. You are pushing yourself too hard if you cannot talk while you are exercising. If you become short of breath or dizzy or have chest pain, stop, sit down, and rest.  · Do not exercise when you do not feel well. Take medicines correctly  · Take your medicines exactly as prescribed. Call your doctor if you think you are having a problem with your medicine. · Make a list of all the medicines you take. Include those prescribed to you by other doctors and any over-the-counter medicines, vitamins, or supplements you take. Take this list with you when you go to any doctor. · Take your medicines at the same time every day. It may help you to post a list of all the medicines you take every day and what time of day you take them. · Make taking your medicine as simple as you can. Plan times to take your medicines when you are doing other things, such as eating a meal or getting ready for bed. This will make it easier to remember to take your medicines. · Get organized.  Use helpful tools, such as daily or weekly pill containers. When should you call for help? Call 911  if you have symptoms of sudden heart failure such as:    · You have severe trouble breathing.     · You cough up pink, foamy mucus.     · You have a new irregular or rapid heartbeat. Call your doctor now or seek immediate medical care if:    · You have new or increased shortness of breath.     · You are dizzy or lightheaded, or you feel like you may faint.     · You have sudden weight gain, such as more than 2 to 3 pounds in a day or 5 pounds in a week. (Your doctor may suggest a different range of weight gain.)     · You have increased swelling in your legs, ankles, or feet.     · You are suddenly so tired or weak that you cannot do your usual activities. Watch closely for changes in your health, and be sure to contact your doctor if you develop new symptoms. Where can you learn more? Go to http://www.gray.com/  Enter V089 in the search box to learn more about \"Avoiding Triggers With Heart Failure: Care Instructions. \"  Current as of: April 29, 2021               Content Version: 13.0  © 7192-2302 [employer]. Care instructions adapted under license by [employer] (which disclaims liability or warranty for this information). If you have questions about a medical condition or this instruction, always ask your healthcare professional. [place] [address] any warranty or liability for your use of this information. Patient Education        Low Sodium Diet (2,000 Milligram): Care Instructions  Overview     Limiting sodium can be an important part of managing some health problems. The most common source of sodium is salt. People get most of the salt in their diet from canned, prepared, and packaged foods. Fast food and restaurant meals also are very high in sodium. Your doctor will probably limit your sodium to less than 2,000 milligrams (mg) a day.  This limit counts all the sodium in prepared and packaged foods and any salt you add to your food. Follow-up care is a key part of your treatment and safety. Be sure to make and go to all appointments, and call your doctor if you are having problems. It's also a good idea to know your test results and keep a list of the medicines you take. How can you care for yourself at home? Read food labels  · Read labels on cans and food packages. The labels tell you how much sodium is in each serving. Make sure that you look at the serving size. If you eat more than the serving size, you have eaten more sodium. · Food labels also tell you the Percent Daily Value for sodium. Choose products with low Percent Daily Values for sodium. · Be aware that sodium can come in forms other than salt, including monosodium glutamate (MSG), sodium citrate, and sodium bicarbonate (baking soda). MSG is often added to Asian food. When you eat out, you can sometimes ask for food without MSG or added salt. Buy low-sodium foods  · Buy foods that are labeled \"unsalted\" (no salt added), \"sodium-free\" (less than 5 mg of sodium per serving), or \"low-sodium\" (140 mg or less of sodium per serving). Foods labeled \"reduced-sodium\" and \"light sodium\" may still have too much sodium. Be sure to read the label to see how much sodium you are getting. · Buy fresh vegetables, or frozen vegetables without added sauces. Buy low-sodium versions of canned vegetables, soups, and other canned goods. Prepare low-sodium meals  · Cut back on the amount of salt you use in cooking. This will help you adjust to the taste. Do not add salt after cooking. One teaspoon of salt has about 2,300 mg of sodium. · Take the salt shaker off the table. · Flavor your food with garlic, lemon juice, onion, vinegar, herbs, and spices. Do not use soy sauce, lite soy sauce, steak sauce, onion salt, garlic salt, celery salt, or ketchup on your food. · Use low-sodium salad dressings, sauces, and ketchup.  Or make your own salad dressings and sauces without adding salt. · Use less salt (or none) when recipes call for it. You can often use half the salt a recipe calls for without losing flavor. Other foods such as rice, pasta, and grains do not need added salt. · Rinse canned vegetables, and cook them in fresh water. This removes some--but not all--of the salt. · Avoid water that is naturally high in sodium or that has been treated with water softeners, which add sodium. If you buy bottled water, read the label and choose a sodium-free brand. Avoid high-sodium foods  · Avoid eating:  ? Smoked, cured, salted, and canned meat, fish, and poultry. ? Ham, cueto, hot dogs, and luncheon meats. ? Regular, hard, and processed cheese and regular peanut butter. ? Crackers with salted tops, and other salted snack foods such as pretzels, chips, and salted popcorn. ? Frozen prepared meals, unless labeled low-sodium. ? Canned and dried soups, broths, and bouillon, unless labeled sodium-free or low-sodium. ? Canned vegetables, unless labeled sodium-free or low-sodium. ? [place] fries, pizza, tacos, and other fast foods. ? Pickles, olives, ketchup, and other condiments, especially soy sauce, unless labeled sodium-free or low-sodium. Where can you learn more? Go to http://www.gray.com/  Enter V843 in the search box to learn more about \"Low Sodium Diet (2,000 Milligram): Care Instructions. \"  Current as of: December 17, 2020               Content Version: 13.0  © 8430-1406 [employer]. Care instructions adapted under license by [employer] (which disclaims liability or warranty for this information). If you have questions about a medical condition or this instruction, always ask your healthcare professional. [place] [address] any warranty or liability for your use of this information.

## 2022-01-31 NOTE — PROGRESS NOTES
Problem: Pressure Injury - Risk of  Goal: *Prevention of pressure injury  Description: Document Timbo Scale and appropriate interventions in the flowsheet. Outcome: Progressing Towards Goal  Note: Pressure Injury Interventions:  Sensory Interventions: Assess changes in LOC,Discuss PT/OT consult with provider,Minimize linen layers    Moisture Interventions: Absorbent underpads,Apply protective barrier, creams and emollients,Internal/External urinary devices    Activity Interventions: PT/OT evaluation    Mobility Interventions: PT/OT evaluation    Nutrition Interventions: Document food/fluid/supplement intake                     Problem: Patient Education: Go to Patient Education Activity  Goal: Patient/Family Education  Outcome: Progressing Towards Goal     Problem: Falls - Risk of  Goal: *Absence of Falls  Description: Document Charly Fall Risk and appropriate interventions in the flowsheet.   Outcome: Progressing Towards Goal  Note: Fall Risk Interventions:  Mobility Interventions: Bed/chair exit alarm,Patient to call before getting OOB,Utilize walker, cane, or other assistive device    Mentation Interventions: Adequate sleep, hydration, pain control,Bed/chair exit alarm    Medication Interventions: Bed/chair exit alarm,Patient to call before getting OOB,Teach patient to arise slowly    Elimination Interventions: Bed/chair exit alarm,Call light in reach              Problem: Patient Education: Go to Patient Education Activity  Goal: Patient/Family Education  Outcome: Progressing Towards Goal

## 2022-01-31 NOTE — CONSULTS
Consult    Patient: Lynn Godfrey MRN: [id number]  SSN: [id number]    YOB: 1933  Age: 80 y.o. Sex: female      Subjective:      Lynn Godfrey is a 80 y.o. female who is being seen for dysphagia    Refer doctor   Rosalba Díaz MD    Old l patient was admitted for shortness of breath. with increasing lower extremity edema andweight gain. ER Chest x-ray showed increased pulmonary edema with a BNP of over 10,000. Received IV Lasix, has been seeing cardiologist, patient feel better, with less shortness of breath, GI consultation placed for dysphagia, patient has had a history of esophageal dysmotility, had egd  couple weeks ago, today patient felt the swallows better, after admission to hospital, has no severe nausea vomiting, able to eat.       Past Medical History:   Diagnosis Date    CAD (coronary artery disease)     ABLATION    Hyperlipidemia     Hypertension     Skin cancer     Uterine cancer ([facility] [address] 1998     Past Surgical History:   Procedure Laterality Date    HX HYSTERECTOMY      HX PACEMAKER        Family History   Problem Relation Age of Onset    Tuberculosis Mother     Tuberculosis Father      Social History     Tobacco Use    Smoking status: Never Smoker    Smokeless tobacco: Never Used   Substance Use Topics    Alcohol use: No      Current Facility-Administered Medications   Medication Dose Route Frequency Provider Last Rate Last Admin    [START ON 1/31/2022] losartan (COZAAR) tablet 25 mg  25 mg Oral DAILY Viry Tafoya MD        [START ON 1/31/2022] furosemide (LASIX) injection 40 mg  40 mg IntraVENous DAILY Viry Tafoya MD        carvediloL (COREG) tablet 3.125 mg  3.125 mg Oral BID WITH MEALS Viry Tafoya MD   3.125 mg at 01/30/22 1652    spironolactone (ALDACTONE) tablet 25 mg  25 mg Oral DAILY Viry Tafoya MD   25 mg at 01/30/22 0845    sodium chloride (NS) flush 5-40 mL  5-40 mL IntraVENous Q8H Rosalba Díaz MD   10 mL at 01/30/22 1447    sodium chloride (NS) flush 5-40 mL  5-40 mL IntraVENous PRN Wendy Lewis MD        acetaminophen (TYLENOL) tablet 650 mg  650 mg Oral Q6H PRN Wendy Lewis MD        Or   Priscila Aguirre acetaminophen (TYLENOL) suppository 650 mg  650 mg Rectal Q6H PRN Wendy Lewis MD        polyethylene glycol (MIRALAX) packet 17 g  17 g Oral DAILY PRN Wendy Lewis MD        ondansetron (ZOFRAN ODT) tablet 4 mg  4 mg Oral Q8H PRN Wendy Lewis MD   4 mg at 01/29/22 1445    Or    ondansetron (ZOFRAN) injection 4 mg  4 mg IntraVENous Q6H PRN Wendy Lewis MD        apixaban Darcie Balding) tablet 5 mg  5 mg Oral BID Wendy Lewis MD   5 mg at 01/30/22 2108        Allergies   Allergen Reactions    Codeine Other (comments)    Reglan [Metoclopramide] Other (comments)     States make her paranoid    Seafood Hives     shellfish    Sulfa (Sulfonamide Antibiotics) Other (comments)     Isn't able to recall reaction at this time. Review of Systems:  Review of Systems   Constitutional: Positive for malaise/fatigue. HENT: Negative. Eyes: Negative. Respiratory: Positive for shortness of breath. Cardiovascular: Positive for palpitations, orthopnea and leg swelling. Gastrointestinal: Positive for nausea. Genitourinary: Positive for frequency. Skin: Negative. Neurological: Positive for weakness. Psychiatric/Behavioral: Negative. Objective:     Vitals:    01/30/22 1200 01/30/22 1429 01/30/22 1558 01/30/22 2100   BP:  99/62  103/64   Pulse: 61 94 94 86   Resp:  18  18   Temp:  97.2 °F (36.2 °C)  97.3 °F (36.3 °C)   SpO2:  96%  92%   Weight:       Height:            Physical Exam:  Physical Exam  Constitutional:       Appearance: She is ill-appearing. HENT:      Head: Atraumatic. Mouth/Throat:      Pharynx: No posterior oropharyngeal erythema. Cardiovascular:      Rate and Rhythm: Normal rate.    Pulmonary:      Effort: Pulmonary effort is normal.   Abdominal: General: Abdomen is flat. Musculoskeletal:      Cervical back: Normal range of motion. Right lower leg: Edema present. Left lower leg: No edema. Skin:     General: Skin is dry. Coloration: Skin is pale. Neurological:      Mental Status: She is oriented to person, place, and time. Mental status is at baseline. Recent Results (from the past 24 hour(s))   METABOLIC PANEL, BASIC    Collection Time: 01/30/22  8:54 AM   Result Value Ref Range    Sodium 126 (L) 136 - 145 mmol/L    Potassium 3.2 (L) 3.5 - 5.1 mmol/L    Chloride 88 (L) 97 - 108 mmol/L    CO2 33 (H) 21 - 32 mmol/L    Anion gap 5 5 - 15 mmol/L    Glucose 126 (H) 65 - 100 mg/dL    BUN 10 6 - 20 mg/dL    Creatinine 0.87 0.55 - 1.02 mg/dL    BUN/Creatinine ratio 11 (L) 12 - 20      GFR est AA >60 >60 ml/min/1.73m2    GFR est non-AA >60 >60 ml/min/1.73m2    Calcium 8.9 8.5 - 10.1 mg/dL        XR CHEST PORT   Final Result   Hydrostatic edema. Pleural effusions and bibasilar atelectasis. Assessment:     Hospital Problems  Date Reviewed: 1/24/2022          Codes Class Noted POA    CHF (congestive heart failure) ([email or web address] ICD-10-CM: I50.9  ICD-9-CM: 428.0  1/28/2022 Unknown        Acute exacerbation of CHF (congestive heart failure) ([facility]) ICD-10-CM: I50.9  ICD-9-CM: 428.0  1/27/2022 Unknown          Shortness of breath, CHF,  Dysphagia, secondary to esophageal dysmotility, esophagitis,     no esophageal stricture,    Bx: Of esophagu  The squamous epithelium is thin, with some intercellular edema of the   basal cells.  There is no significant inflammation and no eosinophilia. There is no evidence of dysplasia or neoplasm. A PAS stain was performed,   showing no fungal organisms.  The control shows appropriate staining.      No GI bleeding, p  Plan:   Continue anticoagulations  Diuretic as ordered    May try to pro motilty agent  drugs, Reglan 5 mg before meals      Discussed with the patient and the family member  Signed By: Ayah Diaz Moy Yates MD     January 30, 2022         Thank you for allowing me to participate in this patients care  Cc Referring Physician   Mesfin Eisenberg MD

## 2022-01-31 NOTE — DISCHARGE SUMMARY
Physician Discharge Summary     Patient ID:    Zhang Min  [id number]  82 y.o.  2/18/1933    Admit date: 1/27/2022    Discharge date : 1/31/2022    Chronic Diagnoses:    Problem List as of 1/31/2022 Date Reviewed: 1/24/2022          Codes Class Noted - Resolved    CHF (congestive heart failure) ([facility] ICD-10-CM: I50.9  ICD-9-CM: 428.0  1/28/2022 - Present        Acute exacerbation of CHF (congestive heart failure) ([facility] ICD-10-CM: I50.9  ICD-9-CM: 428.0  1/27/2022 - Present        Sinus bradycardia ICD-10-CM: R00.1  ICD-9-CM: 427.89  1/5/2018 - Present        Near syncope ICD-10-CM: R55  ICD-9-CM: 780.2  1/5/2018 - Present        Essential hypertension ICD-10-CM: I10  ICD-9-CM: 401.9  1/4/2018 - Present          22    Final Diagnoses:   Acute exacerbation of CHF (congestive heart failure) (HCC) [I50.9]  CHF (congestive heart failure) (HCC) [I50.9]  Acute on chronic systolic heart failure  Paroxysmal atrial fibrillation status post ablation  Status post permanent pacemaker/AICD  Hyponatremia likely due to fluid overload  Benign essential hypertension.  Stable     Reason for Hospitalization:  Shortness of breath and bilateral lower leg swelling      Hospital Course:     80year-old admitted for acute on chronic heart failure. Treated with IV Lasix with improvement in breathing. 2D echocardiogram showed an EF of 20 to 25%. Losartan, spironolactone due to medication regimen. Furosemide increased to 40 mg oral twice daily with addition of daily potassium supplement. Daughter counseled on dietary restriction as well as fluid restriction at home. Overall stable for discharge to home with outpatient follow-up            Discharge Medications:   Current Discharge Medication List      START taking these medications    Details   carvediloL (COREG) 3.125 mg tablet Take 1 Tablet by mouth two (2) times daily (with meals) for 30 days.   Qty: 60 Tablet, Refills: 0  Start date: 1/31/2022, End date: 3/2/2022      losartan (COZAAR) 25 mg tablet Take 1 Tablet by mouth daily for 30 days. Qty: 30 Tablet, Refills: 0  Start date: 2/1/2022, End date: 3/3/2022      furosemide (LASIX) 40 mg tablet Take 1 Tablet by mouth two (2) times a day for 30 days. Qty: 60 Tablet, Refills: 0  Start date: 1/31/2022, End date: 3/2/2022         CONTINUE these medications which have CHANGED    Details   potassium chloride SR (K-TAB) 20 mEq tablet Take 1 Tablet by mouth daily for 7 days. Qty: 7 Tablet, Refills: 0  Start date: 1/31/2022, End date: 2/7/2022         CONTINUE these medications which have NOT CHANGED    Details   !! apixaban (Eliquis) 5 mg tablet Take 5 mg by mouth two (2) times a day. spironolactone (ALDACTONE) 25 mg tablet Take 0.5 Tablets by mouth daily. Patient takes 12.5mg  Qty: 90 Tablet, Refills: 1      cetirizine (ZYRTEC) 10 mg tablet TAKE 1 TABLET BY MOUTH EVERY DAY  Refills: 11      fluticasone propionate (FLONASE) 50 mcg/actuation nasal spray USE 1 SPRAY INTO EACH NOSTRIL EVERY DAY  Refills: 11      ondansetron hcl (Zofran) 4 mg tablet Take 1 Tablet by mouth every eight (8) hours as needed for Nausea or Vomiting. Qty: 7 Tablet, Refills: 0      !! Eliquis 5 mg tablet       timolol (TIMOPTIC) 0.5 % ophthalmic solution Apply to surgical wound twice daily until healed  Qty: 10 mL, Refills: 1    Associated Diagnoses: Basal cell carcinoma (BCC) of left forehead       !! - Potential duplicate medications found. Please discuss with provider. Follow up Care:    1. Suzette Malcolm MD in 1-2 weeks. Please call to set up an appointment shortly after discharge. Diet:  Cardiac Diet    Disposition:  Home.     Advanced Directive:   FULL    DNR      Discharge Exam:  Visit Vitals  /74 (BP Patient Position: At rest;Lying)   Pulse 95   Temp 97.8 °F (36.6 °C)   Resp 20   Ht 5' 4\" (1.626 m)   Wt 58.7 kg (129 lb 6.6 oz)   SpO2 95%   BMI 22.21 kg/m²    O2 Flow Rate (L/min): 2 l/min O2 Device: None (Room air)    Temp (24hrs), Av.6 °F (36.4 °C), Min:97.2 °F (36.2 °C), Max:98 °F (36.7 °C)    No intake/output data recorded.  1901 -  0700  In: 1080 [P.O.:1080]  Out: 450 [Urine:450]    General:  Alert, cooperative, no distress, appears stated age. Lungs:   Clear to auscultation bilaterally. Chest wall:  No tenderness or deformity. Heart:  Regular rate and rhythm, S1, S2 normal, no murmur, click, rub or gallop. Abdomen:   Soft, non-tender. Bowel sounds normal. No masses,  No organomegaly. Extremities: Extremities normal, atraumatic, no cyanosis or edema. Pulses: 2+ and symmetric all extremities. Skin: Skin color, texture, turgor normal. No rashes or lesions   Neurologic: CNII-XII intact. No gross sensory or motor deficits         CONSULTATIONS: Cardiology    Significant Diagnostic Studies:   2022: BUN 13 mg/dL (Ref range: 6 - 20 mg/dL); Calcium 8.9 mg/dL (Ref range: 8.5 - 10.1 mg/dL); CO2 28 mmol/L (Ref range: 21 - 32 mmol/L); Creatinine 0.89 mg/dL (Ref range: 0.55 - 1.02 mg/dL); Glucose 108 mg/dL (H; Ref range: 65 - 100 mg/dL); HCT 34.4 % (L; Ref range: 35.0 - 47.0 %); HGB 10.9 g/dL (L; Ref range: 11.5 - 16.0 g/dL); Potassium 4.0 mmol/L (Ref range: 3.5 - 5.1 mmol/L); Sodium 128 mmol/L (L; Ref range: 136 - 145 mmol/L)  Recent Labs     22  0732   WBC 2.7*   HGB 10.9*   HCT 33.6*        Recent Labs     22  0854 22  0732 22  1700   * 130*  --    K 3.2* 3.0*  --    CL 88* 92*  --    CO2 33* 32  --    BUN 10 10  --    CREA 0.87 0.76  --    * 99  --    CA 8.9 8.7  --    MG  --   --  1.8     Recent Labs     22  0732 22  1700   ALT 17  --    AP 80  --    TBILI 0.7  --    TP 6.8  --    ALB 2.5*  --    GLOB 4.3*  --    AML  --  30   LPSE  --  168     No results for input(s): INR, PTP, APTT, INREXT in the last 72 hours. No results for input(s): FE, TIBC, PSAT, FERR in the last 72 hours.    No results for input(s): PH, PCO2, PO2 in the last 72 hours. No results for input(s): CPK, CKMB in the last 72 hours.     No lab exists for component: TROPONINI  No results found for: [facility]    Total Time: 35 minutes    Signed:  Jazmin Temple MD  1/31/2022  9:58 AM

## 2022-01-31 NOTE — PROGRESS NOTES
PHYSICAL THERAPY EVALUATION  Patient: Jayce Yuan (42 y.o. female)  Date: 1/31/2022  Primary Diagnosis: Acute exacerbation of CHF (congestive heart failure) ([facility]) [I50.9]  [facility] [I[facility]]        Precautions: falls      ASSESSMENT  This is a 81 y/o female who came to  [facility]  ED with c/o shortness of breath, admitted for acute exacerbation of CHF on 1/27/22. Pt with PMH of paroxysmal atrial fibrillation, essential hypertension and hyperlipidemia. Pt received semi-supine in bed , agreeable for PT eval/tx . Pt is A& O x self, time with cues, disoriented to place , per pt report , she lives with daughter in a 2 story home , lives on first floor )  with 5-6 steps to enter ,not sure about HR ,  was IND with ADL's and IND for functional transfers/mobility with no AD prior to admission. Other DME owned: RW     Based on the objective data described below, the patient presents with confusion, decreased strength , 3+/5 grossly in  b/l LE , balance deficits , generalized weakness , decreased safety awareness , decreased activity tolerance and increased need for assist with functional mobility ( needs SBA for rolling from side to side  , CGA for supine <>sit transfers , good  static sitting balance , CGA for sit <>stand and toilet transfers . Pt is IND with perineal care,  CGA for  static  standing balance , is able to ambulate - 20' with RW ( to and from the bathroom ) CGA with slow alma and decreased step length ) . Pt would benefit from continued skilled PT services to address current impairments and improve overall IND and safety with  functional transfers/mobility. Recommend d/c to home with HHPT and 24 x 7 care when medically appropriate . Current Level of Function Impacting Discharge (mobility/balance): requires CGA for functional transfers/mobility      Functional Outcome Measure:   The patient scored 18 on the AM PAC basic mobility outcome measure which is indicative of medium complexity. Other factors to consider for discharge: PLOF, severity of deficits        PLAN :  Recommendations and Planned Interventions: bed mobility training, transfer training, gait training, therapeutic exercises, neuromuscular re-education, patient and family training/education and therapeutic activities      Frequency/Duration: Patient will be followed by physical therapy:  2-3x/week to address goals. Recommendation for discharge: (in order for the patient to meet his/her long term goals)  Home with [address] [facility] with 24 x 7 care    This discharge recommendation:  Has been made in collaboration with the attending provider and/or case management    IF patient discharges home will need the following DME: gait belt and rolling walker         SUBJECTIVE:   Patient stated  I want to go to the bathroom     OBJECTIVE DATA SUMMARY:   HISTORY:    Past Medical History:   Diagnosis Date    CAD (coronary artery disease)     ABLATION    Hyperlipidemia     Hypertension     Skin cancer     Uterine cancer ([facility] [address] 1998     Past Surgical History:   Procedure Laterality Date    HX HYSTERECTOMY      HX PACEMAKER         Personal factors and/or comorbidities impacting plan of care:     Home Situation  Home Environment: Private residence  Wheelchair Ramp: No  One/Two Story Residence: Two story, live on 1st floor  Living Alone: No  New Trudy: Child(shekhar)  Patient Expects to be Discharged to[de-identified] Home  Current DME Used/Available at Home: Walker, rolling    PLOF: Pt IND for ADLS/IADLS, IND with mobility prior to admission.      EXAMINATION/PRESENTATION/DECISION MAKING:   Critical Behavior:  Neurologic State: Alert,Confused  Orientation Level: Oriented to person,Oriented to time,Disoriented to place  Cognition: Follows commands  Safety/Judgement: Decreased awareness of need for safety,Decreased awareness of need for assistance,Decreased awareness of environment,Decreased insight into deficits  Hearing: Auditory  Auditory Impairment: None  Skin:  Intact where exposed    Range Of Motion:  AROM: Generally decreased, functional  Strength:    Strength: Generally decreased, functional (3+/5 grossly for b/l Le)  Tone & Sensation:   Tone: Normal  Sensation: Intact  Functional Mobility:  Bed Mobility:  Rolling: Stand-by assistance  Supine to Sit: Contact guard assistance  Sit to Supine: Contact guard assistance  Scooting: Stand-by assistance  Transfers:  Sit to Stand: Contact guard assistance  Stand to Sit: Contact guard assistance  Balance:   Sitting: Impaired; Without support  Sitting - Static: Good (unsupported)  Sitting - Dynamic: Fair (occasional)  Standing: Impaired; Without support  Standing - Static: Good  Standing - Dynamic : Fair  Ambulation/Gait Training:  Distance (ft): 20 Feet (ft)  Assistive Device: Walker, rolling;Gait belt  Ambulation - Level of Assistance: Contact guard assistance  Speed/Cinthia: Slow  Step Length: Right shortened;Left shortened      Functional Measure:    [facility] AM-PAC 6 Clicks         Basic Mobility Inpatient Short Form  How much difficulty does the patient currently have. .. Unable A Lot A Little None   1. Turning over in bed (including adjusting bedclothes, sheets and blankets)? [] 1   [] 2   [x] 3   [] 4   2. Sitting down on and standing up from a chair with arms ( e.g., wheelchair, bedside commode, etc.)   [] 1   [] 2   [x] 3   [] 4   3. Moving from lying on back to sitting on the side of the bed? [] 1   [] 2   [x] 3   [] 4          How much help from another person does the patient currently need. .. Total A Lot A Little None   4. Moving to and from a bed to a chair (including a wheelchair)? [] 1   [] 2   [x] 3   [] 4   5. Need to walk in hospital room? [] 1   [] 2   [x] 3   [] 4   6. Climbing 3-5 steps with a railing? [] 1   [] 2   [x] 3   [] 4   © 2007, Trustees of [facility], under license to [employer].  All rights reserved Score:  Initial:18 Most Recent: X (Date: -22- )   Interpretation of Tool:  Represents activities that are increasingly more difficult (i.e. Bed mobility, Transfers, Gait). Score 24 23 22-20 19-15 14-10 9-7 6   Modifier CH CI CJ CK CL CM CN          Physical Therapy Evaluation Charge Determination   History Examination Presentation Decision-Making   MEDIUM  Complexity : 1-2 comorbidities / personal factors will impact the outcome/ POC  MEDIUM Complexity : 3 Standardized tests and measures addressing body structure, function, activity limitation and / or participation in recreation  LOW Complexity : Stable, uncomplicated  Other Functional Measure [facility] 6 medium complexity      Based on the above components, the patient evaluation is determined to be of the following complexity level: LOW     Pain Ratin/10    Activity Tolerance:   Fair  Please refer to the flowsheet for vital signs taken during this treatment. After treatment patient left in no apparent distress:   Supine in bed, Call bell within reach and Bed / chair alarm activated    COMMUNICATION/EDUCATION:   The patients plan of care was discussed with: Registered nurse. Fall prevention education was provided and the patient/caregiver indicated understanding. and Patient/family agree to work toward stated goals and plan of care. Thank you for this referral.    Problem: Mobility Impaired (Adult and Pediatric)  Goal: *Acute Goals and Plan of Care (Insert Text)  Description: Pt will be I with LE HEP in 7 days. Pt will perform bed mobility with mod I in 7 days. Pt will perform transfers independently in 7 days. Pt will amb 100 feet with LRAD or no AD safely with mod I in 7 days.    Outcome: Not Met        Emerson Urbina   Time Calculation: 25 mins

## 2022-01-31 NOTE — PROGRESS NOTES
Medicare pt daughter has received, reviewed, and signed 2nd IM letter informing them of their right to appeal the discharge. Signed copied has been placed on pt bedside chart.

## 2022-01-31 NOTE — PROGRESS NOTES
1/31/2022, 10:42 AM        [facility]  Phone: [phone]      Daily Progress Note:      Patient identification:     Kamran Mcallisterrow  80 y. o.female  2/18/1933      Primary Cardiologist:  Dr. Kriss Orellana    Chief Complaint:  CHF    Assessment:     1. HFrEF:  LVEF 20 to 25%. 2. Hypokalemia  3. Atrial fibrillation: On Eliquis  4. History of permanent pacemaker  5. Esophageal dysmotility/esophagitis/gastritis  6. Hyponatremia    Recommendation:     1. Continue GDMT for cardiomyopathy. 2. Maintain euvolemia. 3. Low-sodium diet. 4. Bleeding avoidance measures. 5. Electrolyte replacement. Interval History:  No new cardiac developments. Subjective:  Pt. States no chest pain    Review of Systems:   No worsening shortness of breath or edema. No TIA or strokelike symptoms. No bleeding. Medications:     Current Facility-Administered Medications   Medication Dose Route Frequency    losartan (COZAAR) tablet 25 mg  25 mg Oral DAILY    furosemide (LASIX) injection 40 mg  40 mg IntraVENous DAILY    carvediloL (COREG) tablet 3.125 mg  3.125 mg Oral BID WITH MEALS    spironolactone (ALDACTONE) tablet 25 mg  25 mg Oral DAILY    sodium chloride (NS) flush 5-40 mL  5-40 mL IntraVENous Q8H    sodium chloride (NS) flush 5-40 mL  5-40 mL IntraVENous PRN    acetaminophen (TYLENOL) tablet 650 mg  650 mg Oral Q6H PRN    Or    acetaminophen (TYLENOL) suppository 650 mg  650 mg Rectal Q6H PRN    polyethylene glycol (MIRALAX) packet 17 g  17 g Oral DAILY PRN    ondansetron (ZOFRAN ODT) tablet 4 mg  4 mg Oral Q8H PRN    Or    ondansetron (ZOFRAN) injection 4 mg  4 mg IntraVENous Q6H PRN    apixaban (ELIQUIS) tablet 5 mg  5 mg Oral BID       Allergies:    Allergies   Allergen Reactions    Codeine Other (comments)    Reglan [Metoclopramide] Other (comments)     States make her paranoid    Seafood Hives     shellfish    Sulfa (Sulfonamide Antibiotics) Other (comments)     Isn't able to recall reaction at this time. Physical Exam:   Visit Vitals  /74 (BP Patient Position: At rest;Lying)   Pulse 95   Temp 97.8 °F (36.6 °C)   Resp 20   Ht 5' 4\" (1.626 m)   Wt 58.7 kg (129 lb 6.6 oz)   SpO2 95%   BMI 22.21 kg/m²       General:  NAD, calm, cooperative  CVS:  Regular rate and rhythm, normal S1S2, no new murmurs, rubs or gallops  Pulm:  Normal effort, clear to auscultation bilaterally  Abd:  Soft, non-tender, non-distended  Ext:  Warm and well perfused 1+ bilateral LE edema  Neuro: Nonfocal.    Telemetry reviewed: Normal sinus rhythm    Labs:    CBC  Lab Results   Component Value Date/Time    WBC 2.7 (L) 01/29/2022 07:32 AM    RBC 4.04 01/29/2022 07:32 AM    HCT 33.6 (L) 01/29/2022 07:32 AM    MCV 83.2 01/29/2022 07:32 AM    MCH 27.0 01/29/2022 07:32 AM    RDW 13.9 01/29/2022 07:32 AM    MONOS 11 01/27/2022 02:45 PM    EOS 0 01/27/2022 02:45 PM    BASOS 1 01/27/2022 02:45 PM       Basic Metabolic Profile  Lab Results   Component Value Date     (L) 01/30/2022    CO2 33 (H) 01/30/2022    BUN 10 01/30/2022       Diagnostic Studies:    Echo Results  (Last 48 hours)    None        XR CHEST PORT   Final Result   Hydrostatic edema. Pleural effusions and bibasilar atelectasis.           Dr. Stew Sims MD, Danay Guido

## 2022-01-31 NOTE — PROGRESS NOTES
Dual skin assessment performed with Alfred Person RN. Ecchymosis noted throughout body. External hemorrhoid noted. Otherwise skin is clean, dry, and intact.

## 2022-02-03 ENCOUNTER — TELEPHONE (OUTPATIENT)
Dept: PRIMARY CARE CLINIC | Age: 87
End: 2022-02-03

## 2022-02-03 NOTE — TELEPHONE ENCOUNTER
----- Message from Pieter Gordillo sent at 2/1/2022  5:31 PM EST -----  Subject: Message to Provider    QUESTIONS  Information for Provider? Pt's daughter called to alert PCP that pt was   released from hospital 1/31/2022; Lesly Eckert said her mom \"did not come out   the same woman\"; she is very confused; pt's daughter would like some   direction on what to do next. Please call her at [facility] phone [phone]   or [employer] [phone]  ---------------------------------------------------------------------------  --------------  Nelda SAXENA  What is the best way for the office to contact you? OK to leave message on   voicemail  Preferred Call Back Phone Number? [phone]  ---------------------------------------------------------------------------  --------------  SCRIPT ANSWERS  Relationship to Patient? Other  Representative Name? daughter, Lesly Eckert  Is the Representative on the appropriate HIPAA document in Epic?  Yes

## 2022-02-03 NOTE — TELEPHONE ENCOUNTER
Called and spoke with Nicol Christopher, daughter. Patient is doing better today. Appointment with Cardio Doctor this afternoon. Will update office as needed.

## 2022-02-03 NOTE — TELEPHONE ENCOUNTER
----- Message from Lakhwinder Wood sent at 2/1/2022  5:31 PM EST -----  Subject: Message to Provider    QUESTIONS  Information for Provider? Pt's daughter called to alert PCP that pt was   released from hospital 1/31/2022; Martin Vaca said her mom \"did not come out   the same woman\"; she is very confused; pt's daughter would like some   direction on what to do next. Please call her at [facility] phone [phone]   or [employer] [phone]  ---------------------------------------------------------------------------  --------------  Savanna [employer] INFO  What is the best way for the office to contact you? OK to leave message on   voicemail  Preferred Call Back Phone Number? [phone]  ---------------------------------------------------------------------------  --------------  SCRIPT ANSWERS  Relationship to Patient? Other  Representative Name? daughter, Martin Vaca  Is the Representative on the appropriate HIPAA document in Epic?  Yes

## 2022-02-09 ENCOUNTER — OFFICE VISIT (OUTPATIENT)
Dept: PRIMARY CARE CLINIC | Age: 87
End: 2022-02-09
Payer: MEDICARE

## 2022-02-09 VITALS
HEART RATE: 78 BPM | TEMPERATURE: 97.4 F | OXYGEN SATURATION: 98 % | WEIGHT: 127.4 LBS | HEIGHT: 64 IN | BODY MASS INDEX: 21.75 KG/M2 | DIASTOLIC BLOOD PRESSURE: 57 MMHG | SYSTOLIC BLOOD PRESSURE: 105 MMHG | RESPIRATION RATE: 18 BRPM

## 2022-02-09 DIAGNOSIS — E87.6 HYPOKALEMIA: ICD-10-CM

## 2022-02-09 DIAGNOSIS — Z09 HOSPITAL DISCHARGE FOLLOW-UP: ICD-10-CM

## 2022-02-09 DIAGNOSIS — I95.9 HYPOTENSION, UNSPECIFIED HYPOTENSION TYPE: ICD-10-CM

## 2022-02-09 DIAGNOSIS — I50.23 ACUTE ON CHRONIC SYSTOLIC CONGESTIVE HEART FAILURE (HCC): Primary | ICD-10-CM

## 2022-02-09 PROCEDURE — G8427 DOCREV CUR MEDS BY ELIG CLIN: HCPCS | Performed by: FAMILY MEDICINE

## 2022-02-09 PROCEDURE — 1111F DSCHRG MED/CURRENT MED MERGE: CPT | Performed by: FAMILY MEDICINE

## 2022-02-09 PROCEDURE — G8420 CALC BMI NORM PARAMETERS: HCPCS | Performed by: FAMILY MEDICINE

## 2022-02-09 PROCEDURE — G8510 SCR DEP NEG, NO PLAN REQD: HCPCS | Performed by: FAMILY MEDICINE

## 2022-02-09 PROCEDURE — 1101F PT FALLS ASSESS-DOCD LE1/YR: CPT | Performed by: FAMILY MEDICINE

## 2022-02-09 PROCEDURE — G8536 NO DOC ELDER MAL SCRN: HCPCS | Performed by: FAMILY MEDICINE

## 2022-02-09 PROCEDURE — 1090F PRES/ABSN URINE INCON ASSESS: CPT | Performed by: FAMILY MEDICINE

## 2022-02-09 PROCEDURE — 99496 TRANSJ CARE MGMT HIGH F2F 7D: CPT | Performed by: FAMILY MEDICINE

## 2022-02-09 RX ORDER — LOSARTAN POTASSIUM 25 MG/1
12.5 TABLET ORAL DAILY
Qty: 15 TABLET | Refills: 2 | Status: SHIPPED | OUTPATIENT
Start: 2022-02-09 | End: 2022-02-22

## 2022-02-09 RX ORDER — POTASSIUM CHLORIDE 20MEQ/15ML
20 LIQUID (ML) ORAL DAILY
Qty: 480 ML | Refills: 2 | Status: SHIPPED | OUTPATIENT
Start: 2022-02-09 | End: 2022-05-11

## 2022-02-09 NOTE — PROGRESS NOTES
Visit Vitals  BP (!) 105/57 (BP 1 Location: Left upper arm, BP Patient Position: Sitting, BP Cuff Size: Large adult)   Pulse 78   Temp 97.4 °F (36.3 °C) (Temporal)   Resp 18   Ht 5' 4\" (1.626 m)   Wt 127 lb 6.4 oz (57.8 kg)   SpO2 98%   BMI 21.87 kg/m²     Chief Complaint   Patient presents with    Follow-up     Hosptial follow up d/c on 01/31/2022, 4 days     1. Have you been to the ER, urgent care clinic since your last visit? Hospitalized since your last visit? No    2. Have you seen or consulted any other health care providers outside of the [address] [place] [address] since your last visit? Include any pap smears or colon screening.  No

## 2022-02-09 NOTE — PROGRESS NOTES
HPI     Chief Complaint   Patient presents with    Follow-up     Hosptial follow up d/c on 01/31/2022, 4 days        HPI:  Jose Bear is a 80 y.o. female who has a history of CHF, sinus bradycardia, essential hypertension and chronic nausea who presents for transitional care visit for acute CHF exacerbation. Hospital: [facility]    Admission date: 01/27/2022  Discharge date: 01/39/2022  Admission diagnosis: Acute CHF exacerbation    Patient went to the hospital due to bilateral lower leg swelling. She was found to have acute CHF exacerbation. Echo during hospitalization showed EF of 20-25%. Her furosemide was increased to 40 mg twice a day and she was added on potassium. She finds the potassium pills to hard to swallow    Her daughter thinks that she is improving. She has noticed drastic decrease in leg swelling, which she states was about 2-3 times without they currently are. She denies chest pain or shortness of breath. BP has been SBP 90s-100s since hospital discharge. She is sometimes tired but denies presyncope or dizziness. She sees Dr. Ryan Correa- did her pacemaker. She has appt to see Dr. Georgiana Gonzales. Allergies   Allergen Reactions    Codeine Other (comments)    Reglan [Metoclopramide] Other (comments)     States make her paranoid    Seafood Hives     shellfish    Sulfa (Sulfonamide Antibiotics) Other (comments)     Isn't able to recall reaction at this time. Current Outpatient Medications   Medication Sig    potassium chloride (KAON 10%) 20 mEq/15 mL solution Take 15 mL by mouth daily for 30 doses.  losartan (COZAAR) 25 mg tablet Take 0.5 Tablets by mouth daily.  carvediloL (COREG) 3.125 mg tablet Take 1 Tablet by mouth two (2) times daily (with meals) for 30 days.  furosemide (LASIX) 40 mg tablet Take 1 Tablet by mouth two (2) times a day for 30 days.  apixaban (Eliquis) 5 mg tablet Take 5 mg by mouth two (2) times a day.     ondansetron hcl (Zofran) 4 mg tablet Take 1 Tablet by mouth every eight (8) hours as needed for Nausea or Vomiting.  spironolactone (ALDACTONE) 25 mg tablet Take 0.5 Tablets by mouth daily. Patient takes 12.5mg    timolol (TIMOPTIC) 0.5 % ophthalmic solution Apply to surgical wound twice daily until healed    cetirizine (ZYRTEC) 10 mg tablet TAKE 1 TABLET BY MOUTH EVERY DAY    fluticasone propionate (FLONASE) 50 mcg/actuation nasal spray USE 1 SPRAY INTO EACH NOSTRIL EVERY DAY    Eliquis 5 mg tablet  (Patient not taking: Reported on 2/9/2022)     No current facility-administered medications for this visit. Review of Systems   Respiratory: Negative for cough and shortness of breath. Cardiovascular: Negative for chest pain and palpitations. Neurological: Negative for dizziness, seizures and weakness. Reviewed PmHx, FmHx, SocHx as well as meds and allergies, updated and dated in the chart. Objective     Visit Vitals  BP (!) 105/57 (BP 1 Location: Left upper arm, BP Patient Position: Sitting, BP Cuff Size: Large adult)   Pulse 78   Temp 97.4 °F (36.3 °C) (Temporal)   Resp 18   Ht 5' 4\" (1.626 m)   Wt 127 lb 6.4 oz (57.8 kg)   SpO2 98%   BMI 21.87 kg/m²     Physical Exam  Vitals and nursing note reviewed. Constitutional:       Appearance: Normal appearance. Cardiovascular:      Rate and Rhythm: Normal rate and regular rhythm. Heart sounds: Normal heart sounds. Pulmonary:      Effort: Pulmonary effort is normal. No respiratory distress. Breath sounds: Normal breath sounds. No rhonchi or rales. Musculoskeletal:      Comments: Bilateral 1+ edema in lower legs up to knees. Neurological:      General: No focal deficit present. Mental Status: She is alert and oriented to person, place, and time. Assessment and Plan     Diagnoses and all orders for this visit:    1. Acute on chronic systolic congestive heart failure ([address]  Comments:  Improved since hospitalization.   Close follow-up with cardiology. She is now on Lasix. Orders:  -     METABOLIC PANEL, BASIC    2. Hospital discharge follow-up    3. Hypokalemia  Comments:  Changing to potassium liquid. Lab check today. 4. Hypotension, unspecified hypotension type  Comments:  Patient slightly hypotensive but asymptomatic. Likely secondary to new Lasix. Decreasing losartan to 12.5 mg daily. Other orders  -     potassium chloride (KAON 10%) 20 mEq/15 mL solution; Take 15 mL by mouth daily for 30 doses. -     losartan (COZAAR) 25 mg tablet; Take 0.5 Tablets by mouth daily. Medication Side Effects and Warnings were discussed with patient. Patient Labs were reviewed and or requested. Patient Past Records were reviewed and or requested. Follow-up and Dispositions    · Return in about 2 months (around 4/9/2022) for chronic follow up. Follow-up and Disposition History          I have discussed the diagnosis with the patient and the intended plan as seen in the above orders. The patient has received an after-visit summary and questions were answered concerning future plans. I have discussed medication side effects and warnings with the patient as well.       Jovan Al MD  [address] [place] [address]

## 2022-02-10 LAB
BUN SERPL-MCNC: 13 MG/DL (ref 8–27)
BUN/CREAT SERPL: 15 (ref 12–28)
CALCIUM SERPL-MCNC: 8.9 MG/DL (ref 8.7–10.3)
CHLORIDE SERPL-SCNC: 93 MMOL/L (ref 96–106)
CO2 SERPL-SCNC: 29 MMOL/L (ref 20–29)
CREAT SERPL-MCNC: 0.87 MG/DL (ref 0.57–1)
GLUCOSE SERPL-MCNC: 99 MG/DL (ref 65–99)
POTASSIUM SERPL-SCNC: 3.3 MMOL/L (ref 3.5–5.2)
SODIUM SERPL-SCNC: 135 MMOL/L (ref 134–144)

## 2022-02-22 RX ORDER — LOSARTAN POTASSIUM 25 MG/1
TABLET ORAL
Qty: 30 TABLET | Refills: 5 | Status: SHIPPED | OUTPATIENT
Start: 2022-02-22 | End: 2022-02-22 | Stop reason: ALTCHOICE

## 2022-02-22 RX ORDER — LOSARTAN POTASSIUM 25 MG/1
12.5 TABLET ORAL DAILY
Qty: 15 TABLET | Refills: 2 | Status: SHIPPED | OUTPATIENT
Start: 2022-02-22 | End: 2022-07-26

## 2022-02-22 NOTE — TELEPHONE ENCOUNTER
Last visit losartan decreased to 12.5mg daily from 25mg daily due to hypotension. Shirley, please call and notify family, refill for 12.5mg sent. How's BP been at home?

## 2022-02-23 ENCOUNTER — TELEPHONE (OUTPATIENT)
Dept: PRIMARY CARE CLINIC | Age: 87
End: 2022-02-23

## 2022-03-18 PROBLEM — R00.1 SINUS BRADYCARDIA: Status: ACTIVE | Noted: 2018-01-05

## 2022-03-18 PROBLEM — I95.9 HYPOTENSION: Status: ACTIVE | Noted: 2022-02-09

## 2022-03-19 PROBLEM — I50.9 ACUTE EXACERBATION OF CHF (CONGESTIVE HEART FAILURE) (HCC): Status: ACTIVE | Noted: 2022-01-27

## 2022-03-19 PROBLEM — R55 NEAR SYNCOPE: Status: ACTIVE | Noted: 2018-01-05

## 2022-03-19 PROBLEM — E87.6 HYPOKALEMIA: Status: ACTIVE | Noted: 2022-02-09

## 2022-03-19 PROBLEM — I10 ESSENTIAL HYPERTENSION: Status: ACTIVE | Noted: 2018-01-04

## 2022-03-19 PROBLEM — I50.9 CHF (CONGESTIVE HEART FAILURE) (HCC): Status: ACTIVE | Noted: 2022-01-28

## 2022-03-29 ENCOUNTER — TELEPHONE (OUTPATIENT)
Dept: PRIMARY CARE CLINIC | Age: 87
End: 2022-03-29

## 2022-03-29 NOTE — TELEPHONE ENCOUNTER
Tierra Coffey called today saying there is a modified swallow test that she has been waiting on to be signed for three weeks I do not see the specific order she is speaking of, she said if she does not hear back she will come up to the office. .. I told pts daughter I would pass this message.

## 2022-04-11 ENCOUNTER — TELEPHONE (OUTPATIENT)
Dept: PRIMARY CARE CLINIC | Age: 87
End: 2022-04-11

## 2022-04-11 NOTE — TELEPHONE ENCOUNTER
Patient is calling to get a prior auth for a swallow test that Dr. Layne Alvarez order.    The insurance number is [id number]

## 2022-04-22 PROBLEM — R13.10 SWALLOWING DIFFICULTY: Status: ACTIVE | Noted: 2022-04-22

## 2022-05-03 ENCOUNTER — TELEPHONE (OUTPATIENT)
Dept: PRIMARY CARE CLINIC | Age: 87
End: 2022-05-03

## 2022-05-03 NOTE — TELEPHONE ENCOUNTER
Faxed over 5 home health documents needs them signed and faxed back as quickly as possible to bill pts medicare.  Last attempted to fax them Friday April the 29

## 2022-05-11 RX ORDER — POTASSIUM CHLORIDE 20MEQ/15ML
20 LIQUID (ML) ORAL DAILY
Qty: 473 ML | Refills: 1 | Status: SHIPPED | OUTPATIENT
Start: 2022-05-11 | End: 2022-06-08

## 2022-05-13 ENCOUNTER — TELEPHONE (OUTPATIENT)
Dept: PRIMARY CARE CLINIC | Age: 87
End: 2022-05-13

## 2022-05-13 NOTE — TELEPHONE ENCOUNTER
Briseyda Gonzales took a  swallow test here she is going to dr Hiro Govea who deals with the esophagus so he'll need a copy of the test.     If not it can be picked up.

## 2022-05-24 ENCOUNTER — TELEPHONE (OUTPATIENT)
Dept: PRIMARY CARE CLINIC | Age: 87
End: 2022-05-24

## 2022-05-24 NOTE — TELEPHONE ENCOUNTER
Received call from on call service from patient's daughter Shaquille Farah. Message stated patient was exposed to covid and they were unable to get a covid test scheduled with [facility].  Asking for recommendations on where to get covid test.   On call service attempted to call patient's daughter back on two phone numbers to get us connected with no answer. Discussed with on call that we can do covid testing at our office or if the patient's daughter calls back and would like to speak to this provider, they are more than welcome to call me back.

## 2022-06-01 ENCOUNTER — TELEPHONE (OUTPATIENT)
Dept: PRIMARY CARE CLINIC | Age: 87
End: 2022-06-01

## 2022-06-01 ENCOUNTER — APPOINTMENT (OUTPATIENT)
Dept: GENERAL RADIOLOGY | Age: 87
End: 2022-06-01
Attending: EMERGENCY MEDICINE
Payer: MEDICARE

## 2022-06-01 ENCOUNTER — HOSPITAL ENCOUNTER (EMERGENCY)
Age: 87
Discharge: HOME | End: 2022-06-01
Attending: STUDENT IN AN ORGANIZED HEALTH CARE EDUCATION/TRAINING PROGRAM
Payer: MEDICARE

## 2022-06-01 VITALS
OXYGEN SATURATION: 97 % | RESPIRATION RATE: 18 BRPM | HEART RATE: 70 BPM | TEMPERATURE: 99.5 F | DIASTOLIC BLOOD PRESSURE: 48 MMHG | WEIGHT: 125 LBS | BODY MASS INDEX: 21.34 KG/M2 | HEIGHT: 64 IN | SYSTOLIC BLOOD PRESSURE: 113 MMHG

## 2022-06-01 DIAGNOSIS — U07.1 COVID-19: Primary | ICD-10-CM

## 2022-06-01 PROCEDURE — 71045 X-RAY EXAM CHEST 1 VIEW: CPT

## 2022-06-01 PROCEDURE — 99283 EMERGENCY DEPT VISIT LOW MDM: CPT

## 2022-06-01 NOTE — ED PROVIDER NOTES
Rajesh Lira is a 80 y.o. female with past medical history notable for  has a past medical history of CAD (coronary artery disease), Hyperlipidemia, Hypertension, Skin cancer, and Uterine cancer ([facility] [facility] presenting with sore throat started today, had a positive home COVID test and multiple family members recently infected with COVID. She denies dyspnea, chest pain. Does not have lower extremity edema. She has not had fever thus far. She has history of CAD and CHF. She has a pacemaker. Family is concerned that she is high risk and they would like to begin treatment. Past Medical History:   Diagnosis Date    CAD (coronary artery disease)     ABLATION    Hyperlipidemia     Hypertension     Skin cancer     Uterine cancer ([facility] [facility] 1998       Past Surgical History:   Procedure Laterality Date    HX HYSTERECTOMY      HX PACEMAKER           Family History:   Problem Relation Age of Onset    Tuberculosis Mother     Tuberculosis Father        Social History     Socioeconomic History    Marital status:      Spouse name: Not on file    Number of children: Not on file    Years of education: Not on file    Highest education level: Not on file   Occupational History    Not on file   Tobacco Use    Smoking status: Never Smoker    Smokeless tobacco: Never Used   Vaping Use    Vaping Use: Never used   Substance and Sexual Activity    Alcohol use: No    Drug use: No    Sexual activity: Not on file   Other Topics Concern    Not on file   Social History Narrative    Not on file     Social Determinants of Health     Financial Resource Strain:     Difficulty of Paying Living Expenses: Not on file   Food Insecurity:     Worried About [address] in the Last Year: Not on file    Cele of Food in the Last Year: Not on file   Transportation Needs:     Lack of Transportation (Medical): Not on file    Lack of Transportation (Non-Medical):  Not on file   Physical Activity:     Days of Exercise per Week: Not on file    Minutes of Exercise per Session: Not on file   Stress:     Feeling of Stress : Not on file   Social Connections:     Frequency of Communication with Friends and Family: Not on file    Frequency of Social Gatherings with Friends and Family: Not on file    Attends [employer] Services: Not on file    Active Member of [address] [place] [address] [employer] or Organizations: Not on file    Attends Club or Organization Meetings: Not on file    Marital Status: Not on file   Intimate Partner Violence:     Fear of Current or Ex-Partner: Not on file    Emotionally Abused: Not on file    Physically Abused: Not on file    Sexually Abused: Not on file   Housing Stability:     Unable to Pay for Housing in the Last Year: Not on file    Number of [place] in the Last Year: Not on file    Unstable Housing in the Last Year: Not on file         ALLERGIES: Codeine, Reglan [metoclopramide], Seafood, and Sulfa (sulfonamide antibiotics)    Review of Systems   Constitutional: Negative for chills and fever. HENT: Positive for sore throat. Eyes: Negative for photophobia. Respiratory: Positive for cough. Negative for shortness of breath. Cardiovascular: Negative for chest pain. Gastrointestinal: Negative for abdominal pain and nausea. Genitourinary: Negative for dysuria. Musculoskeletal: Negative for back pain. Neurological: Negative for headaches. Psychiatric/Behavioral: Negative for confusion. All other systems reviewed and are negative. Vitals:    06/01/22 1831 06/01/22 1900 06/01/22 1915 06/01/22 2000   BP: 128/69 (!) 119/51 (!) 112/50 (!) 113/48   Pulse: 83   70   Resp: 18   18   Temp: 99.7 °F (37.6 °C)   99.5 °F (37.5 °C)   SpO2: 98% 97% 97% 97%   Weight: 56.7 kg (125 lb)      Height: 5' 4\" (1.626 m)               Physical Exam  Constitutional:       General: She is not in acute distress. Appearance: She is not toxic-appearing. HENT:      Head: Normocephalic and atraumatic. Mouth/Throat:      Mouth: Mucous membranes are moist.   Eyes:      Extraocular Movements: Extraocular movements intact. Cardiovascular:      Rate and Rhythm: Normal rate and regular rhythm. Heart sounds: Normal heart sounds. Pulmonary:      Effort: Pulmonary effort is normal. No respiratory distress. Breath sounds: Normal breath sounds. Abdominal:      Palpations: Abdomen is soft. Tenderness: There is no abdominal tenderness. Musculoskeletal:      Cervical back: Normal range of motion. Right lower leg: No edema. Left lower leg: No edema. Skin:     Capillary Refill: Capillary refill takes less than 2 seconds. Neurological:      General: No focal deficit present. Mental Status: She is alert and oriented to person, place, and time. Psychiatric:         Mood and Affect: Mood normal.          MDM  Number of Diagnoses or Management Options       Risks and benefits of transfer were discussed in detail with patient and her son, specifically the need to discontinue Eliquis while taking this medication, I called again discussed with her son-in-law that her Eliquis should be held for 3 days after completing paxlovid in order to prevent adverse effects based on the [place] drug interactions group analysis from earlier this year, drink plenty of fluids. She will have a somewhat increased risk of stroke which she accepts in the setting of that this would likely be offset by increased risk of hospitalization/adverse outcome related to COVID-19 infection. ED Course as of 06/02/22 0612 Wed Jun 01, 2022   [address] [place] [address] RD., [facility] [NS]      ED Course User Index  [NS] Fadia Quevedo MD       Procedures

## 2022-06-01 NOTE — DISCHARGE INSTRUCTIONS
You should temporarily stop taking any cholesterol medication and your blood thinner (Eliquis) while taking  Paxlovid

## 2022-06-01 NOTE — ED TRIAGE NOTES
Pt ambulatory into ed a&ox3, no acute distress, breaths even and unlabored. C/o scratchy throat and testing +covid today at home. Pt states \"dr vaughan sent me to the ER because she said she won't treat me for covid in the office\". Denies any other complaints. Denies sob or chest pain.

## 2022-06-02 ENCOUNTER — PATIENT OUTREACH (OUTPATIENT)
Dept: CASE MANAGEMENT | Age: 87
End: 2022-06-02

## 2022-06-08 RX ORDER — POTASSIUM CHLORIDE 20MEQ/15ML
20 LIQUID (ML) ORAL DAILY
Qty: 473 ML | Refills: 1 | Status: SHIPPED | OUTPATIENT
Start: 2022-06-08 | End: 2022-07-08

## 2022-06-23 ENCOUNTER — OFFICE VISIT (OUTPATIENT)
Dept: PRIMARY CARE CLINIC | Age: 87
End: 2022-06-23
Payer: MEDICARE

## 2022-06-23 VITALS
DIASTOLIC BLOOD PRESSURE: 57 MMHG | SYSTOLIC BLOOD PRESSURE: 94 MMHG | HEART RATE: 78 BPM | HEIGHT: 64 IN | BODY MASS INDEX: 21.68 KG/M2 | OXYGEN SATURATION: 97 % | WEIGHT: 127 LBS | TEMPERATURE: 97.4 F | RESPIRATION RATE: 20 BRPM

## 2022-06-23 DIAGNOSIS — Z23 ENCOUNTER FOR IMMUNIZATION: ICD-10-CM

## 2022-06-23 DIAGNOSIS — Z00.00 MEDICARE ANNUAL WELLNESS VISIT, SUBSEQUENT: Primary | ICD-10-CM

## 2022-06-23 DIAGNOSIS — E87.6 HYPOKALEMIA: ICD-10-CM

## 2022-06-23 PROCEDURE — G8427 DOCREV CUR MEDS BY ELIG CLIN: HCPCS | Performed by: FAMILY MEDICINE

## 2022-06-23 PROCEDURE — G8510 SCR DEP NEG, NO PLAN REQD: HCPCS | Performed by: FAMILY MEDICINE

## 2022-06-23 PROCEDURE — G0439 PPPS, SUBSEQ VISIT: HCPCS | Performed by: FAMILY MEDICINE

## 2022-06-23 PROCEDURE — G8536 NO DOC ELDER MAL SCRN: HCPCS | Performed by: FAMILY MEDICINE

## 2022-06-23 PROCEDURE — 1101F PT FALLS ASSESS-DOCD LE1/YR: CPT | Performed by: FAMILY MEDICINE

## 2022-06-23 PROCEDURE — G8420 CALC BMI NORM PARAMETERS: HCPCS | Performed by: FAMILY MEDICINE

## 2022-06-23 PROCEDURE — 1123F ACP DISCUSS/DSCN MKR DOCD: CPT | Performed by: FAMILY MEDICINE

## 2022-06-23 RX ORDER — FUROSEMIDE 40 MG/1
40 TABLET ORAL DAILY
COMMUNITY
Start: 2022-05-27

## 2022-06-23 RX ORDER — PANTOPRAZOLE SODIUM 40 MG/1
40 TABLET, DELAYED RELEASE ORAL DAILY
COMMUNITY
Start: 2022-04-29 | End: 2022-07-29 | Stop reason: SDUPTHER

## 2022-06-23 NOTE — PROGRESS NOTES
1. \"Have you been to the ER, urgent care clinic since your last visit? Hospitalized since your last visit? \" No    2. \"Have you seen or consulted any other health care providers outside of the [address] [place] [address] since your last visit? \" No     3. For patients aged 39-70: Has the patient had a colonoscopy / FIT/ Cologuard? NA - based on age      If the patient is female:    4. For patients aged 41-77: Has the patient had a mammogram within the past 2 years? NA - based on age or sex      11. For patients aged 21-65: Has the patient had a pap smear?  NA - based on age or sex  Visit Vitals  BP (!) 94/57 (BP 1 Location: Right upper arm, BP Patient Position: Sitting, BP Cuff Size: Adult)   Pulse 78   Temp 97.4 °F (36.3 °C) (Temporal)   Resp 20   Ht 5' 4\" (1.626 m)   Wt 127 lb (57.6 kg)   SpO2 97%   BMI 21.80 kg/m²     Chief Complaint   Patient presents with   [place] Annual Wellness Visit

## 2022-06-23 NOTE — PROGRESS NOTES
Manuel  1 [facility], [address] [place] [address]  [phone]    Date of visit: 6/23/2022       This is a Subsequent Medicare Annual Wellness Visit (AWV), (Performed more than 12 months after effective date of Medicare Part B enrollment and 12 months after last preventive visit.)    I have reviewed the patient's medical history in detail and updated the computerized patient record. History obtained from: the patient. female  80 y.o. WHITE/NON-  Depression Risk Factor Screening:     3 most recent PHQ Screens 6/23/2022   Little interest or pleasure in doing things Not at all   Feeling down, depressed, irritable, or hopeless Not at all   Total Score PHQ 2 0     C-SSRS [place] Suicide Severity Rating Scale 1/9/2021 6/9/2021 12/26/2021 1/24/2022 1/27/2022   1) Within the past month, have you wished you were dead or wished you could go to sleep and not wake up? No No No No No   2) Have you actually had any thoughts of killing yourself? No No No No No   6) Have you ever done anything, started to do anything, or prepared to do anything to end your life? No No No No No       Fall Risk Factor Screening:     Fall Risk Assessment, last 12 mths 6/23/2022   Able to walk? Yes   Fall in past 12 months? 0   Do you feel unsteady? 0   Are you worried about falling 0       Alcohol Risk Screen    Do you average more than 1 drink per night or more than 7 drinks a week:  No    On any one occasion in the past three months have you have had more than 3 drinks containing alcohol:  No         Functional Ability and Level of Safety:    Hearing: Hearing is good. Activities of Daily Living: The home contains: no safety equipment. Patient does total self care      Ambulation: with no difficulty      Abuse Screen:  Patient is not abused         Histories     Reviewed PmHx, FmHx, SocHx as well as meds and allergies, updated and dated in the chart.     Patient Active Problem List   Diagnosis Code  Essential hypertension I10    Sinus bradycardia R00.1    Near syncope R55    Acute exacerbation of CHF (congestive heart failure) ([facility]) I50.9    CHF (congestive heart failure) ([facility]) I50.9    Hypokalemia E87.6    Hypotension I95.9    Swallowing difficulty R13.10     Past Medical History:   Diagnosis Date    CAD (coronary artery disease)     ABLATION    Hyperlipidemia     Hypertension     Skin cancer     Uterine cancer ([facility] [address] 1998      Past Surgical History:   Procedure Laterality Date    HX HYSTERECTOMY      HX PACEMAKER       Allergies   Allergen Reactions    Codeine Other (comments)    Reglan [Metoclopramide] Other (comments)     States make her paranoid    Seafood Hives     shellfish    Sulfa (Sulfonamide Antibiotics) Other (comments)     Isn't able to recall reaction at this time. Current Outpatient Medications   Medication Sig Dispense Refill    pantoprazole (PROTONIX) 40 mg tablet Take 40 mg by mouth daily.  furosemide (LASIX) 40 mg tablet Take 40 mg by mouth daily.  pneumococcal 23-valent (PNEUMOVAX 23) 25 mcg/0.5 mL injection 0.5 mL by IntraMUSCular route once for 1 dose. 0.5 mL 0    potassium chloride (KAON 10%) 20 mEq/15 mL solution TAKE 15 ML BY MOUTH DAILY FOR 30 DOSES. 473 mL 1    losartan (COZAAR) 25 mg tablet Take 0.5 Tablets by mouth daily. 15 Tablet 2    apixaban (Eliquis) 5 mg tablet Take 5 mg by mouth two (2) times a day. Take 2.5 mg      spironolactone (ALDACTONE) 25 mg tablet Take 0.5 Tablets by mouth daily. Patient takes 12.5mg 90 Tablet 1    cetirizine (ZYRTEC) 10 mg tablet TAKE 1 TABLET BY MOUTH EVERY DAY  11    ondansetron hcl (Zofran) 4 mg tablet Take 1 Tablet by mouth every eight (8) hours as needed for Nausea or Vomiting.  (Patient not taking: Reported on 6/23/2022) 7 Tablet 0    Eliquis 5 mg tablet  (Patient not taking: Reported on 2/9/2022)       Family History   Problem Relation Age of Onset    Tuberculosis Mother     Tuberculosis Father Social History     Tobacco Use    Smoking status: Never Smoker    Smokeless tobacco: Never Used   Substance Use Topics    Alcohol use: No       Current Complaints and Pertinent Exam   If patient is due for chronic medical conditions and/or has acute concerns in addition to the medicare wellness visit, they have been informed there may be a copay for the additional services provided, and agree to do both. ROS & Physical Exam: please see acute note if applicable      Diet and Exercise: patient is active at home and eats a well-balanced diet with a variety of fruits and vegetables. BP Readings from Last 3 Encounters:   06/23/22 (!) 94/57   06/01/22 (!) 113/48   02/09/22 (!) 105/57      Wt Readings from Last 3 Encounters:   06/23/22 127 lb (57.6 kg)   06/01/22 125 lb (56.7 kg)   02/09/22 127 lb 6.4 oz (57.8 kg)     Body mass index is 21.8 kg/m². No exam data present    Was the patient's timed Up & Go test unsteady or longer than 30 seconds? no    Evaluation of Cognitive Function   Mood/affect:  happy  Orientation: Person, Place, Time and Situation  Appearance: age appropriate  Family member/caregiver input: Daughter is present and does not have any concerns with patient's cognitive function. Specialists/Care Team   Sujatha Yuan has established care with the following healthcare providers:  Patient Care Team:  Roa Ge MD as PCP - General (Family Medicine)  Rao Ge MD as PCP - [facility]  Luana Boykin MD as Physician ([address] [place] [address] Vascular Surgery)  Zoe Nazario MD as Physician (Cardiovascular Disease Physician)     [address] [facility] Maintenance Topics with due status: Overdue       Topic Date Due    Pneumococcal 65+ years Never done    DTaP/Tdap/Td series Never done    Shingrix Vaccine Age 50> Never done    Bone Densitometry (Dexa) Screening Never done    COVID-19 Vaccine 09/15/2021     Health Maintenance Topics with due status: Not Due       Topic Last Completion Date    Depression Screen 02/09/2022    Medicare Yearly Exam 06/23/2022    Flu Vaccine Not Due         Colon cancer:  Recommendation: Colonoscopy every 10y or annual FIT test from 50-75 or every 3 year stool DNA based test with consideration of ongoing screening from 76-85. and Not Indicated    Lung cancer (LDCT): Recommendation: Yearly LDCT for pts 55-77 w 30-pack year hx and currently smoke or quit <15 yr ago. and Not Indicated    Hepatitis C:  Recommendation: One time screening for all patient's aged 18-79. and Up to date or Completed    Diabetes:   Recommendation: USPSTF recommends screening ages 38-69 y/o if overweight or obese. Medicare covers screening in those patients who are overweight, obese, have HTN or dyslipiemia, a personal history of gestational diabetes or prior elevated blood sugar, a family hx of DM, or any patient over age 72    Lipids:   Recommendation: screening for hyperlipidemia every 5 years after age 39        PREVENTIVE CARE - FEMALE SCREENINGS     Cervical cancer: Recommendation: Every 3 yr from 21-29 and every 5 yr from 33-67, with Pap and HPV testing. and had uterine cancer and follows with specialist.    Breast cancer: Recommendation:  USPSTF recommends screening mammography every 2 yr from 54-69. The decision to start screening mammography in women prior to age 48 years should be an individual one. Women who place a higher value on the potential benefit than the potential harms may choose to begin biennial screening between the ages of 36 and 52 years. Medicare covers annual screening mammography, USPSTF also recommends women with a personal or family history of breast, ovarian, tubal, or peritoneal cancer or who have an ancestry (Ashkenazi Baptist) associated with breast cancer susceptibility 1 and 2 (BRCA1/2) gene mutations with an appropriate brief familial risk assessment tool.  Women with a positive result on the risk assessment tool should receive genetic counseling and, if indicated after counseling, genetic testing and Specialist continues to follow mammograms due to hx of uterine cancer. Osteoporosis: Recommendation: Screen all women at 72, earlier if elevated risk and Due, ordered    AAA:   Recommendation: One-time screening if family history of AAA and Not Indicated      IMMUNIZATIONS     Immunization History   Administered Date(s) Administered    COVID-19, Rodolfo Carteron, Primary or Immunocompromised Series, MRNA, PF, 100mcg/0.5mL 03/18/2021, 04/15/2021       Pneumovax:   Recommendation: PPSV23 once for all >65 and high risk <65  and prescription given to take to pharmacy. Prevnar:   Recommendation: PCV13 only if >65 and immunocompromised or residing in a nursing home, or in areas of low childhood Pneumococcal vaccination and Not Indicated    Influenza:   Recommendation: Vaccination annually, high dose if 65 or older    Shingrix:  Recommendation: Vaccination 2 shots 2-6 months apart for all age >47 and patient believes she had this in [place]. TDaP:    Recommendation: Vaccination Booster with TDaP every 10 yr. and Patient believes she is UTD and had this in [place]. Discussion of Advance Directive   Discussed with Tavo Gan. Kwabena her ability to prepare and advance directive in the case that an injury or illness causes her to be unable to make health care decisions. Date of ACP Conversation: 06/23/22  Persons included in Conversation:  patient  Length of ACP Conversation in minutes:  <16 minutes (Non-Billable)    Authorized Decision Maker (if patient is incapable of making informed decisions):    This person is:   Named in Advance Directive or Healthcare Power of             For Patients with Decision Making Capacity:   Values/Goals: Exploration of values, goals, and preferences if recovery is not expected, even with continued medical treatment in the event of:  Imminent death  Severe, permanent brain injury  \"In these circumstances, what matters most to you? \"  Care focused more on quantity (length) of life. Conversation Outcomes / Follow-Up Plan:   ACP in process - information provided, considering goals and options  Patient already has an ACP, she will bring in documentation. She states that her desire is to have life saving and life prolonging measures even in a dire state. \"I want to be here as long as I can\". Assessment/Plan     Diagnoses and all orders for this visit:    1. Medicare annual wellness visit, subsequent  Comments:   updated. Patient believes she is up-to-date on several vaccines including Shingrix, Tdap. She will see if she can get [facility]    2. Encounter for immunization    3. Hypokalemia  Comments:  Chronic and stable. Checking labs today. She also forgets to take her potassium supplementation. Orders:  -     METABOLIC PANEL, BASIC    Other orders  -     pneumococcal 23-valent (PNEUMOVAX 23) 25 mcg/0.5 mL injection; 0.5 mL by IntraMUSCular route once for 1 dose. Follow-up and Dispositions    · Return in about 6 months (around 12/23/2022) for chronic follow up.          Jina Armas MD  [address] [place] [address]

## 2022-06-24 LAB
BUN SERPL-MCNC: 16 MG/DL (ref 8–27)
BUN/CREAT SERPL: 16 (ref 12–28)
CALCIUM SERPL-MCNC: 8.9 MG/DL (ref 8.7–10.3)
CHLORIDE SERPL-SCNC: 98 MMOL/L (ref 96–106)
CO2 SERPL-SCNC: 26 MMOL/L (ref 20–29)
CREAT SERPL-MCNC: 1 MG/DL (ref 0.57–1)
EGFR: 54 ML/MIN/1.73
GLUCOSE SERPL-MCNC: 86 MG/DL (ref 65–99)
POTASSIUM SERPL-SCNC: 3.9 MMOL/L (ref 3.5–5.2)
SODIUM SERPL-SCNC: 138 MMOL/L (ref 134–144)

## 2022-06-24 NOTE — PROGRESS NOTES
Pls call. Potassium actually normal. This is good news. Kidney function expected given age, slightly depressed but no significantly acute changes overall.      Dr. Katie Barrera

## 2022-07-05 DIAGNOSIS — E87.6 HYPOKALEMIA: Primary | ICD-10-CM

## 2022-07-06 RX ORDER — POTASSIUM CHLORIDE 20MEQ/15ML
20 LIQUID (ML) ORAL DAILY
Qty: 473 ML | Refills: 1 | OUTPATIENT
Start: 2022-07-06 | End: 2022-08-05

## 2022-07-06 NOTE — TELEPHONE ENCOUNTER
We had discussed holding potassium as she often forgets it and last potassium normal.    Hold refill for now, let's recheck potassium in 1 month. Needs lab only appt, order placed.     Dr. Lane Number

## 2022-07-06 NOTE — TELEPHONE ENCOUNTER
Called and talked with daughter, Bharti Duarte. Going to hold the potassium and recheck bw in 1 month.

## 2022-07-25 ENCOUNTER — TELEPHONE (OUTPATIENT)
Dept: PRIMARY CARE CLINIC | Age: 87
End: 2022-07-25

## 2022-07-25 NOTE — TELEPHONE ENCOUNTER
Pt daughter called stated that she spoke with [facility] about getting pantroprazle refilled. Was having difficulties with pharmacy about getting it filled.

## 2022-07-27 RX ORDER — PANTOPRAZOLE SODIUM 40 MG/1
40 TABLET, DELAYED RELEASE ORAL DAILY
OUTPATIENT
Start: 2022-07-27

## 2022-07-27 NOTE — TELEPHONE ENCOUNTER
Pls call patient. Last appt we discussed weaning protonix as reflux was improving. How are her symptoms now? Does she think she could go down to every other day dosing?

## 2022-07-29 ENCOUNTER — TELEPHONE (OUTPATIENT)
Dept: PRIMARY CARE CLINIC | Age: 87
End: 2022-07-29

## 2022-07-29 RX ORDER — PANTOPRAZOLE SODIUM 40 MG/1
40 TABLET, DELAYED RELEASE ORAL
Qty: 30 TABLET | Refills: 3 | Status: SHIPPED | OUTPATIENT
Start: 2022-07-29

## 2022-11-25 RX ORDER — CETIRIZINE HYDROCHLORIDE 10 MG/1
10 TABLET ORAL DAILY
Qty: 30 TABLET | Refills: 11 | Status: CANCELLED | OUTPATIENT
Start: 2022-11-25

## 2022-12-02 RX ORDER — CETIRIZINE HYDROCHLORIDE 10 MG/1
10 TABLET ORAL DAILY
Qty: 30 TABLET | Refills: 5 | Status: SHIPPED | OUTPATIENT
Start: 2022-12-02

## 2022-12-16 ENCOUNTER — VIRTUAL VISIT (OUTPATIENT)
Dept: PRIMARY CARE CLINIC | Age: 87
End: 2022-12-16
Payer: MEDICARE

## 2022-12-16 DIAGNOSIS — E87.6 HYPOKALEMIA: ICD-10-CM

## 2022-12-16 DIAGNOSIS — Z95.0 PACEMAKER: ICD-10-CM

## 2022-12-16 DIAGNOSIS — I50.22 CHRONIC SYSTOLIC CONGESTIVE HEART FAILURE (HCC): Primary | ICD-10-CM

## 2022-12-16 DIAGNOSIS — I10 ESSENTIAL HYPERTENSION: ICD-10-CM

## 2022-12-16 DIAGNOSIS — I48.91 ATRIAL FIBRILLATION, UNSPECIFIED TYPE (HCC): ICD-10-CM

## 2022-12-16 NOTE — PROGRESS NOTES
1. \"Have you been to the ER, urgent care clinic since your last visit? Hospitalized since your last visit? \" No    2. \"Have you seen or consulted any other health care providers outside of the [address] [place] [address] since your last visit? \" No     3. For patients aged 39-70: Has the patient had a colonoscopy / FIT/ Cologuard? NA - based on age      If the patient is female:    4. For patients aged 41-77: Has the patient had a mammogram within the past 2 years? NA - based on age or sex      11. For patients aged 21-65: Has the patient had a pap smear?  NA - based on age or sex  Chief Complaint   Patient presents with    Follow Up Chronic Condition

## 2022-12-16 NOTE — PROGRESS NOTES
HPI     Chief Complaint   Patient presents with    Follow Up Chronic Condition        HPI:  Samy Wood is a 80 y.o. female who has a history of CHF, sinus bradycardia, essential hypertension and chronic nausea      CHF: followed by Dr. Herlinda Butcher with [facility], feels okay, denies dyspnea or chest pain. Last ECHO showed EF 20-25%. She is now on furosemide once a day. Dr. Mandeep Marie- did her pacemaker. She has appt to see Dr. Jericho Bradley. Potassium supplementation: was placed on potassium back when she was placed on lasix due to CHF. Last potassium was normal. Daughter thought she could stop the potassium. She is only giving it to her mom about once a month \"when she has bad muscle aches\". Muscle aches resolve with potassium. A fib: has pacemaker and is on eliquis. Denies melena or hematuria. No falls. Review of Systems   Constitutional:  Negative for fever. Cardiovascular:  Negative for chest pain and palpitations. Gastrointestinal:  Negative for blood in stool and melena. Genitourinary:  Negative for hematuria. Musculoskeletal:  Negative for falls and myalgias. Neurological:  Negative for dizziness and seizures. Current Outpatient Medications on File Prior to Visit   Medication Sig Dispense Refill    cetirizine (ZYRTEC) 10 mg tablet Take 1 Tablet by mouth daily. 30 Tablet 5    pantoprazole (PROTONIX) 40 mg tablet Take 1 Tablet by mouth daily as needed (reflux). 30 Tablet 3    losartan (COZAAR) 25 mg tablet TAKE 1/2 TABLET BY MOUTH EVERY DAY 45 Tablet 5    furosemide (LASIX) 40 mg tablet Take 40 mg by mouth daily. apixaban (ELIQUIS) 5 mg tablet Take 5 mg by mouth two (2) times a day. Take 2.5 mg      spironolactone (ALDACTONE) 25 mg tablet Take 0.5 Tablets by mouth daily. Patient takes 12.5mg 90 Tablet 1    ondansetron hcl (Zofran) 4 mg tablet Take 1 Tablet by mouth every eight (8) hours as needed for Nausea or Vomiting.  (Patient not taking: No sig reported) 7 Tablet 0 Eliquis 5 mg tablet  (Patient not taking: No sig reported)       No current facility-administered medications on file prior to visit. Allergies   Allergen Reactions    Codeine Other (comments)    Reglan [Metoclopramide] Other (comments)     States make her paranoid    Seafood Hives     shellfish    Sulfa (Sulfonamide Antibiotics) Other (comments)     Isn't able to recall reaction at this time. Reviewed PmHx, FmHx, SocHx as well as meds and allergies, updated and dated in the chart. Objective     Vital Signs: (As obtained by patient/caregiver at home)  There were no vitals taken for this visit. No flowsheet data found.     [INSTRUCTIONS:  \"[x]\" Indicates a positive item  \"[]\" Indicates a negative item  -- DELETE ALL ITEMS NOT EXAMINED]    Constitutional: [x] Appears well-developed and well-nourished [x] No apparent distress      [] Abnormal -     Mental status: [x] Alert and awake  [x] Oriented to person/place/time [x] Able to follow commands    [] Abnormal -     Eyes:   EOM    [x]  Normal    [] Abnormal -   Sclera  [x]  Normal    [] Abnormal -          Discharge [x]  None visible   [] Abnormal -     HENT: [x] Normocephalic, atraumatic  [] Abnormal -   [x] Mouth/Throat: Mucous membranes are moist    External Ears [x] Normal  [] Abnormal -    Neck: [x] No visualized mass [] Abnormal -     Pulmonary/Chest: [x] Respiratory effort normal   [x] No visualized signs of difficulty breathing or respiratory distress        [] Abnormal -      Musculoskeletal:   [x] Normal gait with no signs of ataxia         [x] Normal range of motion of neck        [] Abnormal -     Neurological:        [x] No Facial Asymmetry (Cranial nerve 7 motor function) (limited exam due to video visit)          [x] No gaze palsy        [] Abnormal -          Skin:        [x] No significant exanthematous lesions or discoloration noted on facial skin         [] Abnormal -            Psychiatric:       [x] Normal Affect [] Abnormal - [x] No Hallucinations    Other pertinent observable physical exam findings:-     Well appearing. Non toxic appearing. Speech is fluent. Assessment and Plan     Diagnoses and all orders for this visit:    1. Chronic systolic congestive heart failure (HCC)  Comments:  No signs of exacerbation virtually, sympomatically well. Following with Cardiology an an EP closely. 2. Hypokalemia  Comments:  Checking BMP. Advised likely needs potassium daily while on Lasix. Orders:  -     METABOLIC PANEL, BASIC    3. Atrial fibrillation, unspecified type ([address]  Comments:  Eliquis per Dr. Alannah Vela. 4. Pacemaker  Comments: Follows with Dr. Alannah Vela. 5. Essential hypertension  Comments:  Continue losartan 12.5mg daily and aldactone 25mg. Follow-up and Dispositions    Return for [facility] Kwabena is being evaluated by a Virtual Visit (video visit) encounter to address concerns as mentioned above. A caregiver was present when appropriate. Due to this being a TeleHealth encounter (During [facility]- public [facility] emergency), evaluation of the following organ systems was limited: Vitals/Constitutional/EENT/Resp/CV/GI//MS/Neuro/Skin/Heme-Lymph-Imm. Pursuant to the emergency declaration under the [facility] [address] authority and the [email or web address] and [employer] Act, this Virtual Visit was conducted with patient's (and/or legal guardian's) consent, to reduce the patient's risk of exposure to COVID-19 and provide necessary medical care. The patient (and/or legal guardian) has also been advised to contact this office for worsening conditions or problems, and seek emergency medical treatment and/or call 911 if deemed necessary. Patient identification was verified at the start of the visit: YES    Services were provided through a video synchronous discussion virtually to substitute for in-person clinic visit. Patient was located at home and provider was located in office or at home.        Bee Alvarez MD  [address] [place] [address]

## 2023-02-06 ENCOUNTER — APPOINTMENT (OUTPATIENT)
Dept: GENERAL RADIOLOGY | Age: 88
End: 2023-02-06
Attending: STUDENT IN AN ORGANIZED HEALTH CARE EDUCATION/TRAINING PROGRAM
Payer: MEDICARE

## 2023-02-06 ENCOUNTER — HOSPITAL ENCOUNTER (EMERGENCY)
Age: 88
Discharge: HOME | End: 2023-02-06
Attending: STUDENT IN AN ORGANIZED HEALTH CARE EDUCATION/TRAINING PROGRAM
Payer: MEDICARE

## 2023-02-06 VITALS
HEART RATE: 79 BPM | BODY MASS INDEX: 22.5 KG/M2 | SYSTOLIC BLOOD PRESSURE: 128 MMHG | DIASTOLIC BLOOD PRESSURE: 78 MMHG | HEIGHT: 63 IN | RESPIRATION RATE: 18 BRPM | OXYGEN SATURATION: 96 % | TEMPERATURE: 97.6 F | WEIGHT: 127 LBS

## 2023-02-06 DIAGNOSIS — J06.9 UPPER RESPIRATORY TRACT INFECTION, UNSPECIFIED TYPE: Primary | ICD-10-CM

## 2023-02-06 DIAGNOSIS — R05.1 ACUTE COUGH: ICD-10-CM

## 2023-02-06 PROCEDURE — 71046 X-RAY EXAM CHEST 2 VIEWS: CPT

## 2023-02-06 PROCEDURE — 99283 EMERGENCY DEPT VISIT LOW MDM: CPT

## 2023-02-06 PROCEDURE — 74011250637 HC RX REV CODE- 250/637: Performed by: STUDENT IN AN ORGANIZED HEALTH CARE EDUCATION/TRAINING PROGRAM

## 2023-02-06 RX ORDER — BENZONATATE 100 MG/1
100 CAPSULE ORAL
Qty: 30 CAPSULE | Refills: 0 | Status: SHIPPED | OUTPATIENT
Start: 2023-02-06 | End: 2023-02-16

## 2023-02-06 RX ORDER — BENZONATATE 100 MG/1
100 CAPSULE ORAL
Status: COMPLETED | OUTPATIENT
Start: 2023-02-06 | End: 2023-02-06

## 2023-02-06 RX ADMIN — BENZONATATE 100 MG: 100 CAPSULE ORAL at 14:49

## 2023-02-06 NOTE — DISCHARGE INSTRUCTIONS
You presented to ED with cough. Chest x-ray shows no signs of pneumonia. Most likely this is a acute bronchitis/upper respiratory infection. Prescriptions written for [facility] for cough. Additionally I recommend the following treatments for allergic rhinitis/postnasal drip/allergy symptoms. Trial Zyrtec over-the-counter, generic is fine for 1 week. Also try Flonase, over-the-counter this is a steroid nasal spray. 1 spray in each nostril daily for at least 7 days. Cepacol cough and sore throat lozenges as needed for daytime cough. Additionally you can try saline nasal spray daily as needed for dry nasal passageways and allergic symptoms.

## 2023-02-06 NOTE — ED PROVIDER NOTES
Patient is a 70-year-old female presented to ED with cough and congestion since last Wednesday. Patient states she caught something from her 42-year-old grandson. Patient endorses subjective fevers but has not taken her temperature. Patient is afebrile here. Family has been giving Robitussin and Mucinex. Patient is also taking nasal ipratropium as well as Flonase without significant improvement. Patient states she feels little better now that she is in the ED. The history is provided by the patient and a relative. Nasal Congestion  Pertinent negatives include no chest pain. Cough  Associated symptoms include sore throat. Pertinent negatives include no chest pain.       Past Medical History:   Diagnosis Date    CAD (coronary artery disease)     ABLATION    Hyperlipidemia     Hypertension     Skin cancer     Uterine cancer ([facility] [address] 1998       Past Surgical History:   Procedure Laterality Date    HX HYSTERECTOMY      HX PACEMAKER           Family History:   Problem Relation Age of Onset    Tuberculosis Mother     Tuberculosis Father        Social History     Socioeconomic History    Marital status:      Spouse name: Not on file    Number of children: Not on file    Years of education: Not on file    Highest education level: Not on file   Occupational History    Not on file   Tobacco Use    Smoking status: Never    Smokeless tobacco: Never   Vaping Use    Vaping Use: Never used   Substance and Sexual Activity    Alcohol use: No    Drug use: No    Sexual activity: Not on file   Other Topics Concern    Not on file   Social History Narrative    Not on file     Social Determinants of Health     Financial Resource Strain: Low Risk     Difficulty of Paying Living Expenses: Not very hard   Food Insecurity: No Food Insecurity    Worried About Running Out of Food in the Last Year: Never true    Ran Out of Food in the Last Year: Never true   Transportation Needs: Not on file   Physical Activity: Not on file Stress: Not on file   Social Connections: Not on file   Intimate Partner Violence: Not on file   Housing Stability: Not on file         ALLERGIES: Codeine, Reglan [metoclopramide], Seafood, and Sulfa (sulfonamide antibiotics)    Review of Systems   Constitutional:  Positive for activity change. Negative for appetite change. HENT:  Positive for congestion and sore throat. Respiratory:  Positive for cough. Negative for chest tightness. Cardiovascular:  Negative for chest pain. Vitals:    02/06/23 1346 02/06/23 1533   BP: (!) 154/97 128/78   Pulse: 81 79   Resp: 16 18   Temp: 97.6 °F (36.4 °C)    SpO2: 99% 96%   Weight: 57.6 kg (127 lb)    Height: 5' 3\" (1.6 m)             Physical Exam  Vitals and nursing note reviewed. Constitutional:       Appearance: Normal appearance. HENT:      Head: Normocephalic and atraumatic. Right Ear: External ear normal.      Left Ear: External ear normal.   Eyes:      Conjunctiva/sclera: Conjunctivae normal.   Cardiovascular:      Rate and Rhythm: Normal rate and regular rhythm. Pulses: Normal pulses. Heart sounds: Normal heart sounds. Pulmonary:      Effort: Pulmonary effort is normal.      Breath sounds: Normal breath sounds. Chest:      Chest wall: No deformity or tenderness. Abdominal:      Palpations: Abdomen is soft. Tenderness: There is no abdominal tenderness. Musculoskeletal:         General: No deformity or signs of injury. Normal range of motion. Skin:     General: Skin is warm and dry. Coloration: Skin is not jaundiced. Neurological:      General: No focal deficit present. Mental Status: She is alert and oriented to person, place, and time. Psychiatric:         Mood and Affect: Mood normal.         Behavior: Behavior normal.        Medical Decision Making  Amount and/or Complexity of Data Reviewed  Independent Historian: caregiver  Radiology: ordered and independent interpretation performed.     Risk  Prescription drug management. IMAGING RESULTS:  XR CHEST PA LAT   Final Result   1. No acute cardiopulmonary disease              MEDICATIONS GIVEN:  Medications   benzonatate (TESSALON) capsule 100 mg (100 mg Oral Given 2/6/23 2868)       Differential diagnosis: Upper respiratory infection, pneumonia, cough    ED physician interpretation of imaging: Chest x-ray focal pneumonia or pneumothorax    MDM: 80 y.o. female presents with symptoms c/w acute URI (cough, rhinorrhea, congestion) for 6 days. Patient appears well. No signs of toxicity. No hypoxia, tachypnea or other signs of respiratory distress. Lungs CTAB. No signs of clinical dehydration. Doubt PNA, and no evidence of any other illness. Further personalized recommendations for outpatient care as below. Key Discharge Instructions and summary of care: You presented to ED with cough. Chest x-ray shows no signs of pneumonia. Most likely this is a acute bronchitis/upper respiratory infection. Prescriptions written for [facility] for cough. Additionally I recommend the following treatments for allergic rhinitis/postnasal drip/allergy symptoms. Trial Zyrtec over-the-counter, generic is fine for 1 week. Also try Flonase, over-the-counter this is a steroid nasal spray. 1 spray in each nostril daily for at least 7 days. Cepacol cough and sore throat lozenges as needed for daytime cough. Additionally you can try saline nasal spray daily as needed for dry nasal passageways and allergic symptoms. The patient has been re-evaluated and feeling better. Tessalon Perles with improvement. Patient is stable for discharge. All available radiology and laboratory results have been reviewed with patient and/or available family.   Patient and/or family verbally conveyed their understanding and agreement of the patient's signs, symptoms, diagnosis, treatment and prognosis and additionally agree to follow-up as recommended in the discharge instructions or to return to the Emergency Department should their condition change or worsen prior to their follow-up appointment. All questions have been answered and patient and/or available family express understanding. ED Impression:    ICD-10-CM ICD-9-CM    1. Upper respiratory tract infection, unspecified type  J06.9 465.9       2. Acute cough  R05.1 786.2            DISPOSITION: Discharged    Diogo Amato MD          Procedures

## 2023-02-08 ENCOUNTER — PATIENT OUTREACH (OUTPATIENT)
Dept: CASE MANAGEMENT | Age: 88
End: 2023-02-08

## 2023-02-08 NOTE — PROGRESS NOTES
02/08/23  1:05 PM  Attempted initial ACM outreach, no answer- lvm on daughter's cellphone for return call. Will attempt another outreach tomorrow if no callback. 02/09/23  2:15 PM  2nd attempt for ACM outreach, no answer- lvm for return call. No future outreach attempts scheduled at this time.

## 2023-11-09 NOTE — PROGRESS NOTES
General Question     Subject: R SHOULDER MRI FAX   Patient and /or Facility Request: Reno Epps  Contact Number: [phone]      PATIENT CALLED IN TO SEE IF HE CAN GET AN REFERRAL SENT OVER TO  [facility] FOR HIS R SHOULDER FAX...    FAX NUMBER [phone]    PLEASE ADVISE    Sara Mandujano is a 80 y.o. female presents to the office today with the following:  Chief Complaint   Patient presents with    Surgery     revision and inset nose       17-year-old  female presents for division and inset of a melolabial interpolated flap that was used to reconstruct a left alar defect about 3 weeks ago. She has had some oozing from the base of the pedicle over the past few days but otherwise has had no problems with the surgical site. She is in her normal state of good health and has no additional skin complaints today. Physical Exam  HENT:      Head: Normocephalic. Pulmonary:      Effort: Pulmonary effort is normal.   Skin:     Comments: On the left cheek and nose there is a pink and well-perfused nasolabial interpolated flap. Neurological:      Mental Status: She is alert and oriented to person, place, and time. 1. Basal cell carcinoma (BCC) of left ala nasi  The left cheek, upper lip and nose were anesthetized and prepped in standard fashion. A pair of scissors was inserted underneath the flap pedicle. A #15 blade was used to sharply divide the flap pedicle. The proximal residual pedicle and the cheek was debrided of any accumulated granulation tissue at the base and the flap was thinned except for a thin layer of subcutaneous fat. The V to Y flap was reinserted into the medial cheek in order to obtain some fullness of the cheek. The flap and excepting site were undermined widely in the subcutaneous plane. 5-0 Monocryl and 6-0 Prolene were used to reinsert the flap into the cheek. This flap measured 2.0 x 0.9 cm. Attention was then turned to the alar portion of the reconstruction.   Skin hooks were used to reflect the flap anteriorly while a #15 blade was used to sharply debride all residual granulated tissue from underneath the flap as well as to freshen the edge of the surgical defect in order to prepare the wound for acceptance of the inset of the nasolabial flap. The flap was trimmed to fit and the edge was slightly beveled to allow for more precise inset. 6-0 Vicryl interrupted sutures were used to perform the final inset of the melolabial interpolated flap at the left ala. Vaseline and a pressure dressing were applied to be left in place for 2 days. The patient was given written and verbal wound care instructions including my cell phone number. She will return in 1 week for suture removal.  She was discharged in good condition today. 2. Skin wound from surgical incision  See the procedure above regarding preparation of the granulated surgical site for acceptance of both the V to Y flap and the medial labial interpolated flap.       Follow-up and Dispositions    · Return in about 1 week (around 2/21/2020) for suture removal .         Andrés Bishop MD

## 2024-04-08 ENCOUNTER — OFFICE VISIT (OUTPATIENT)
Dept: PRIMARY CARE CLINIC | Facility: CLINIC | Age: 89
End: 2024-04-08
Payer: COMMERCIAL

## 2024-04-08 VITALS
BODY MASS INDEX: 22.04 KG/M2 | WEIGHT: 124.4 LBS | SYSTOLIC BLOOD PRESSURE: 105 MMHG | DIASTOLIC BLOOD PRESSURE: 51 MMHG | HEIGHT: 63 IN | TEMPERATURE: 98 F | OXYGEN SATURATION: 98 % | RESPIRATION RATE: 16 BRPM | HEART RATE: 88 BPM

## 2024-04-08 DIAGNOSIS — M25.50 ARTHRALGIA, UNSPECIFIED JOINT: ICD-10-CM

## 2024-04-08 DIAGNOSIS — D69.59 THROMBOCYTOPENIA DUE TO DRUGS: ICD-10-CM

## 2024-04-08 DIAGNOSIS — M05.741 RHEUMATOID ARTHRITIS INVOLVING BOTH HANDS WITH POSITIVE RHEUMATOID FACTOR (HCC): ICD-10-CM

## 2024-04-08 DIAGNOSIS — D64.9 ERYTHROCYTOPENIA: ICD-10-CM

## 2024-04-08 DIAGNOSIS — Z86.39 H/O VITAMIN D DEFICIENCY: ICD-10-CM

## 2024-04-08 DIAGNOSIS — M05.742 RHEUMATOID ARTHRITIS INVOLVING BOTH HANDS WITH POSITIVE RHEUMATOID FACTOR (HCC): ICD-10-CM

## 2024-04-08 DIAGNOSIS — B35.1 TINEA UNGUIUM: ICD-10-CM

## 2024-04-08 DIAGNOSIS — I10 ESSENTIAL (PRIMARY) HYPERTENSION: Primary | ICD-10-CM

## 2024-04-08 DIAGNOSIS — H61.23 BILATERAL IMPACTED CERUMEN: ICD-10-CM

## 2024-04-08 DIAGNOSIS — T50.905A THROMBOCYTOPENIA DUE TO DRUGS: ICD-10-CM

## 2024-04-08 DIAGNOSIS — R20.2 BILATERAL LEG PARESTHESIA: ICD-10-CM

## 2024-04-08 PROBLEM — I95.9 HYPOTENSION: Status: ACTIVE | Noted: 2022-02-09

## 2024-04-08 PROBLEM — R13.10 SWALLOWING DIFFICULTY: Status: ACTIVE | Noted: 2022-04-22

## 2024-04-08 PROCEDURE — G8427 DOCREV CUR MEDS BY ELIG CLIN: HCPCS | Performed by: PREVENTIVE MEDICINE

## 2024-04-08 PROCEDURE — 1090F PRES/ABSN URINE INCON ASSESS: CPT | Performed by: PREVENTIVE MEDICINE

## 2024-04-08 PROCEDURE — 1123F ACP DISCUSS/DSCN MKR DOCD: CPT | Performed by: PREVENTIVE MEDICINE

## 2024-04-08 PROCEDURE — 99214 OFFICE O/P EST MOD 30 MIN: CPT | Performed by: PREVENTIVE MEDICINE

## 2024-04-08 PROCEDURE — G8420 CALC BMI NORM PARAMETERS: HCPCS | Performed by: PREVENTIVE MEDICINE

## 2024-04-08 PROCEDURE — 69209 REMOVE IMPACTED EAR WAX UNI: CPT | Performed by: PREVENTIVE MEDICINE

## 2024-04-08 PROCEDURE — 1036F TOBACCO NON-USER: CPT | Performed by: PREVENTIVE MEDICINE

## 2024-04-08 RX ORDER — CARVEDILOL 3.12 MG/1
3.12 TABLET ORAL 2 TIMES DAILY WITH MEALS
COMMUNITY
Start: 2024-01-22

## 2024-04-08 SDOH — ECONOMIC STABILITY: HOUSING INSECURITY
IN THE LAST 12 MONTHS, WAS THERE A TIME WHEN YOU DID NOT HAVE A STEADY PLACE TO SLEEP OR SLEPT IN A SHELTER (INCLUDING NOW)?: NO

## 2024-04-08 SDOH — ECONOMIC STABILITY: INCOME INSECURITY: HOW HARD IS IT FOR YOU TO PAY FOR THE VERY BASICS LIKE FOOD, HOUSING, MEDICAL CARE, AND HEATING?: NOT HARD AT ALL

## 2024-04-08 SDOH — ECONOMIC STABILITY: FOOD INSECURITY: WITHIN THE PAST 12 MONTHS, THE FOOD YOU BOUGHT JUST DIDN'T LAST AND YOU DIDN'T HAVE MONEY TO GET MORE.: NEVER TRUE

## 2024-04-08 SDOH — ECONOMIC STABILITY: FOOD INSECURITY: WITHIN THE PAST 12 MONTHS, YOU WORRIED THAT YOUR FOOD WOULD RUN OUT BEFORE YOU GOT MONEY TO BUY MORE.: NEVER TRUE

## 2024-04-08 ASSESSMENT — PATIENT HEALTH QUESTIONNAIRE - PHQ9
SUM OF ALL RESPONSES TO PHQ QUESTIONS 1-9: 0
SUM OF ALL RESPONSES TO PHQ QUESTIONS 1-9: 0
2. FEELING DOWN, DEPRESSED OR HOPELESS: NOT AT ALL
SUM OF ALL RESPONSES TO PHQ QUESTIONS 1-9: 0
SUM OF ALL RESPONSES TO PHQ QUESTIONS 1-9: 0
1. LITTLE INTEREST OR PLEASURE IN DOING THINGS: NOT AT ALL
SUM OF ALL RESPONSES TO PHQ9 QUESTIONS 1 & 2: 0

## 2024-04-08 ASSESSMENT — ENCOUNTER SYMPTOMS
ALLERGIC/IMMUNOLOGIC NEGATIVE: 1
GASTROINTESTINAL NEGATIVE: 1
RESPIRATORY NEGATIVE: 1
EYES NEGATIVE: 1

## 2024-04-08 NOTE — PROGRESS NOTES
\"Have you been to the ER, urgent care clinic since your last visit?  Hospitalized since your last visit?\"    NO    “Have you seen or consulted any other health care providers outside of [facility] since your last visit?”    NO

## 2024-04-08 NOTE — PROGRESS NOTES
Isidra White is a 89 y.o. female who was seen in clinic today (4/8/2024).    Assessment & Plan:   Below is the assessment and plan developed based on review of pertinent history, physical exam, labs, studies, and medications.    1. Essential (primary) hypertension  -     Comprehensive Metabolic Panel  -     CBC with Auto Differential  -     Lipid Panel  2. Arthralgia, unspecified joint  -     LENO COMPREHENSIVE PANEL  -     RHEUMATIOD ARTHRITIS DIAGNOSTIC PANEL  -     Uric Acid  -     Sedimentation Rate  -     Vitamin D 25 Hydroxy  3. Bilateral leg paresthesia  -     TSH + FREE T4 PANEL  -     Magnesium  -     Vitamin D 25 Hydroxy  4. Tinea unguium  -     AFL - Macho Otto DPM, Podiatry, [place] ([address])  5. Bilateral impacted cerumen  -     REMOVAL IMPACTED CERUMEN IRRIGATION/LVG UNILAT  6. H/O vitamin D deficiency  -     Vitamin D 25 Hydroxy      Patient Instructions   Nature's Bounty Sublingual Liquid B-complex - 1ml daily for 2 weeks, then weekly. Hold under tongue for a 30 seconds and swallow.      Return if symptoms worsen or fail to improve.    Subjective:   Isidra was seen today for Other (Leg pain/ toes)  She is having numbness  from the knees down for about 2 weeks She says her legs get very cold.  She takes B-12 and Vitamin D3 10,000 IU. She complains of coldness in hands in extremities. She exercises 3x daily. She has had to get up in the middle of the night and move her knees as they get very stiff and painful. She says moving them helps her to be mobile.     She was a  and book-keeper by profession. She used to play basketball, tennis and baseball when she was younger. 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      She is requesting her ears be flushed as she has hearing aids that I suspect are pushing the wax into the canal as she inserts them.     Review of Systems   Constitutional: Negative.    HENT:

## 2024-04-08 NOTE — PATIENT INSTRUCTIONS
Nature's Bounty Sublingual Liquid B-complex - 1ml daily for 2 weeks, then weekly. Hold under tongue for a 30 seconds and swallow.   
subacute rehab

## 2024-04-11 LAB
25(OH)D3+25(OH)D2 SERPL-MCNC: 42.8 NG/ML (ref 30–100)
ALBUMIN SERPL-MCNC: 3.8 G/DL (ref 3.6–4.6)
ALBUMIN/GLOB SERPL: 1.3 {RATIO} (ref 1.2–2.2)
ALP SERPL-CCNC: 90 IU/L (ref 44–121)
ALT SERPL-CCNC: 9 IU/L (ref 0–32)
AST SERPL-CCNC: 18 IU/L (ref 0–40)
BASOPHILS # BLD AUTO: 0 X10E3/UL (ref 0–0.2)
BASOPHILS NFR BLD AUTO: 0 %
BILIRUB SERPL-MCNC: 0.4 MG/DL (ref 0–1.2)
BUN SERPL-MCNC: 18 MG/DL (ref 10–36)
BUN/CREAT SERPL: 18 (ref 12–28)
CALCIUM SERPL-MCNC: 8.7 MG/DL (ref 8.7–10.3)
CENTROMERE B AB SER-ACNC: <0.2 AI (ref 0–0.9)
CHLORIDE SERPL-SCNC: 100 MMOL/L (ref 96–106)
CHOLEST SERPL-MCNC: 175 MG/DL (ref 100–199)
CHROMATIN AB SERPL-ACNC: <0.2 AI (ref 0–0.9)
CO2 SERPL-SCNC: 26 MMOL/L (ref 20–29)
CREAT SERPL-MCNC: 1.01 MG/DL (ref 0.57–1)
DSDNA AB SER-ACNC: <1 IU/ML (ref 0–9)
EGFRCR SERPLBLD CKD-EPI 2021: 53 ML/MIN/1.73
ENA JO1 AB SER-ACNC: <0.2 AI (ref 0–0.9)
ENA RNP AB SER-ACNC: <0.2 AI (ref 0–0.9)
ENA SCL70 AB SER-ACNC: <0.2 AI (ref 0–0.9)
ENA SM AB SER-ACNC: <0.2 AI (ref 0–0.9)
ENA SS-A AB SER-ACNC: <0.2 AI (ref 0–0.9)
ENA SS-B AB SER-ACNC: <0.2 AI (ref 0–0.9)
EOSINOPHIL # BLD AUTO: 0 X10E3/UL (ref 0–0.4)
EOSINOPHIL NFR BLD AUTO: 1 %
ERYTHROCYTE [DISTWIDTH] IN BLOOD BY AUTOMATED COUNT: 12.7 % (ref 11.7–15.4)
ERYTHROCYTE [SEDIMENTATION RATE] IN BLOOD BY WESTERGREN METHOD: 23 MM/HR (ref 0–40)
GLOBULIN SER CALC-MCNC: 2.9 G/DL (ref 1.5–4.5)
GLUCOSE SERPL-MCNC: 90 MG/DL (ref 70–99)
HCT VFR BLD AUTO: 33.7 % (ref 34–46.6)
HDLC SERPL-MCNC: 45 MG/DL
HGB BLD-MCNC: 11.3 G/DL (ref 11.1–15.9)
IMM GRANULOCYTES # BLD AUTO: 0 X10E3/UL (ref 0–0.1)
IMM GRANULOCYTES NFR BLD AUTO: 0 %
LDLC SERPL CALC-MCNC: 117 MG/DL (ref 0–99)
LYMPHOCYTES # BLD AUTO: 1.2 X10E3/UL (ref 0.7–3.1)
LYMPHOCYTES NFR BLD AUTO: 48 %
Lab: NORMAL
MAGNESIUM SERPL-MCNC: 2.1 MG/DL (ref 1.6–2.3)
MCH RBC QN AUTO: 30.5 PG (ref 26.6–33)
MCHC RBC AUTO-ENTMCNC: 33.5 G/DL (ref 31.5–35.7)
MCV RBC AUTO: 91 FL (ref 79–97)
MONOCYTES # BLD AUTO: 0.2 X10E3/UL (ref 0.1–0.9)
MONOCYTES NFR BLD AUTO: 10 %
NEUTROPHILS # BLD AUTO: 1 X10E3/UL (ref 1.4–7)
NEUTROPHILS NFR BLD AUTO: 41 %
PLATELET # BLD AUTO: 174 X10E3/UL (ref 150–450)
POTASSIUM SERPL-SCNC: 3.6 MMOL/L (ref 3.5–5.2)
PROT SERPL-MCNC: 6.7 G/DL (ref 6–8.5)
RBC # BLD AUTO: 3.71 X10E6/UL (ref 3.77–5.28)
SODIUM SERPL-SCNC: 137 MMOL/L (ref 134–144)
T4 FREE SERPL-MCNC: 1.41 NG/DL (ref 0.82–1.77)
TRIGL SERPL-MCNC: 70 MG/DL (ref 0–149)
TSH SERPL DL<=0.005 MIU/L-ACNC: 1.89 UIU/ML (ref 0.45–4.5)
URATE SERPL-MCNC: 6 MG/DL (ref 3.1–7.9)
VLDLC SERPL CALC-MCNC: 13 MG/DL (ref 5–40)
WBC # BLD AUTO: 2.5 X10E3/UL (ref 3.4–10.8)

## 2024-04-12 LAB
CCP IGA+IGG SERPL IA-ACNC: >250 UNITS (ref 0–19)
RHEUMATOID FACT SERPL-ACNC: 279.3 IU/ML

## 2024-04-15 ENCOUNTER — TELEPHONE (OUTPATIENT)
Age: 89
End: 2024-04-15

## 2024-04-15 NOTE — TELEPHONE ENCOUNTER
Called patient to set up new patient appt. No answer. Left     Np / Thrombocytopenia / Conchis Chanel     Next available

## 2024-05-16 ENCOUNTER — OFFICE VISIT (OUTPATIENT)
Dept: PRIMARY CARE CLINIC | Facility: CLINIC | Age: 89
End: 2024-05-16
Payer: COMMERCIAL

## 2024-05-16 VITALS
RESPIRATION RATE: 16 BRPM | WEIGHT: 123.2 LBS | HEIGHT: 64 IN | TEMPERATURE: 98.4 F | BODY MASS INDEX: 21.03 KG/M2 | DIASTOLIC BLOOD PRESSURE: 54 MMHG | HEART RATE: 71 BPM | SYSTOLIC BLOOD PRESSURE: 110 MMHG | OXYGEN SATURATION: 98 %

## 2024-05-16 DIAGNOSIS — S12.501D CLOSED NONDISPLACED FRACTURE OF SIXTH CERVICAL VERTEBRA WITH ROUTINE HEALING, UNSPECIFIED FRACTURE MORPHOLOGY, SUBSEQUENT ENCOUNTER: Primary | ICD-10-CM

## 2024-05-16 DIAGNOSIS — R39.81 FUNCTIONAL URINARY INCONTINENCE: ICD-10-CM

## 2024-05-16 DIAGNOSIS — G62.9 NEUROPATHY: ICD-10-CM

## 2024-05-16 PROCEDURE — 1123F ACP DISCUSS/DSCN MKR DOCD: CPT | Performed by: FAMILY MEDICINE

## 2024-05-16 PROCEDURE — 1036F TOBACCO NON-USER: CPT | Performed by: FAMILY MEDICINE

## 2024-05-16 PROCEDURE — 99213 OFFICE O/P EST LOW 20 MIN: CPT | Performed by: FAMILY MEDICINE

## 2024-05-16 PROCEDURE — 1090F PRES/ABSN URINE INCON ASSESS: CPT | Performed by: FAMILY MEDICINE

## 2024-05-16 PROCEDURE — G8420 CALC BMI NORM PARAMETERS: HCPCS | Performed by: FAMILY MEDICINE

## 2024-05-16 PROCEDURE — G8427 DOCREV CUR MEDS BY ELIG CLIN: HCPCS | Performed by: FAMILY MEDICINE

## 2024-05-16 RX ORDER — GABAPENTIN 100 MG/1
100 CAPSULE ORAL DAILY
COMMUNITY
Start: 2024-05-02

## 2024-05-16 RX ORDER — METHOCARBAMOL 500 MG/1
500 TABLET, FILM COATED ORAL 2 TIMES DAILY
COMMUNITY
Start: 2024-05-14

## 2024-05-16 NOTE — PROGRESS NOTES
\"Have you been to the ER, urgent care clinic since your last visit?  Hospitalized since your last visit?\"    NO    “Have you seen or consulted any other health care providers outside of [facility] since your last visit?”    YES - When: approximately 4 days ago.  Where and Why: Chip/ Broken neck.

## 2024-05-16 NOTE — ASSESSMENT & PLAN NOTE
I will attempt to obtain PureWick system to reduce chance of skin breakdown and falls from using restroom at night.

## 2024-05-16 NOTE — PROGRESS NOTES
Subjective  Chief Complaint   Patient presents with    Follow-up     Hosp/ broke C6-C7     HPI:  Isidra White is a 89 y.o. female.  Patient presents after hospital admission for C6 and C7 fracture.  She fell backwards while carrying object in her home.  Fractures were not dislocated with no impingement of nerves or blood vessels, so neurosurgery has her wearing a c-collar.  She has follow-up with them next month.  She is also would like a referral to physical therapy.  Her daughter is concerned about her getting up at night as she has been started on gabapentin for neuropathy (which has helped) as well as meloxicam for the recent fall.  Her daughter like to investigate getting a pure wick system to prevent her getting up and falling in the middle of the night.  Patient has longstanding incontinence and currently wears briefs, but she still feels the need to get up and use the restroom at night.      Past Medical History:   Diagnosis Date    CAD (coronary artery disease)     ABLATION    Hyperlipidemia     Hypertension     Skin cancer     Uterine cancer (HCC) 1998     Current Outpatient Medications on File Prior to Visit   Medication Sig Dispense Refill    gabapentin (NEURONTIN) 100 MG capsule Take 1 capsule by mouth daily.      methocarbamol (ROBAXIN) 500 MG tablet Take 1 tablet by mouth 2 times daily      carvedilol (COREG) 3.125 MG tablet Take 1 tablet by mouth 2 times daily (with meals)      cetirizine (ZYRTEC) 10 MG tablet Take 1 tablet by mouth daily      furosemide (LASIX) 40 MG tablet Take 1 tablet by mouth daily      losartan (COZAAR) 25 MG tablet Take 0.5 tablets by mouth daily      pantoprazole (PROTONIX) 40 MG tablet Take 1 tablet by mouth daily as needed      spironolactone (ALDACTONE) 25 MG tablet Take 0.5 tablets by mouth daily      apixaban (ELIQUIS) 5 MG TABS tablet Take 0.5 tablets by mouth 2 times daily       No current facility-administered medications on file prior to visit.     Allergies

## 2024-05-16 NOTE — ASSESSMENT & PLAN NOTE
Much better controlled with gabapentin.  While there is a risk of sedation increasing fall risk, prior to starting treatment patient is getting up multiple times at night due to the pain, which is also a fall risk.  Risk outweighs benefit at this point.

## 2024-05-29 ENCOUNTER — OFFICE VISIT (OUTPATIENT)
Age: 89
End: 2024-05-29
Payer: MEDICARE

## 2024-05-29 VITALS
HEART RATE: 74 BPM | TEMPERATURE: 98.7 F | OXYGEN SATURATION: 94 % | DIASTOLIC BLOOD PRESSURE: 65 MMHG | HEIGHT: 64 IN | BODY MASS INDEX: 21.03 KG/M2 | WEIGHT: 123.2 LBS | SYSTOLIC BLOOD PRESSURE: 119 MMHG

## 2024-05-29 DIAGNOSIS — B35.1 ONYCHOMYCOSIS: Primary | ICD-10-CM

## 2024-05-29 PROCEDURE — 1036F TOBACCO NON-USER: CPT | Performed by: PODIATRIST

## 2024-05-29 PROCEDURE — 1123F ACP DISCUSS/DSCN MKR DOCD: CPT | Performed by: PODIATRIST

## 2024-05-29 PROCEDURE — 1090F PRES/ABSN URINE INCON ASSESS: CPT | Performed by: PODIATRIST

## 2024-05-29 PROCEDURE — G8420 CALC BMI NORM PARAMETERS: HCPCS | Performed by: PODIATRIST

## 2024-05-29 PROCEDURE — G8427 DOCREV CUR MEDS BY ELIG CLIN: HCPCS | Performed by: PODIATRIST

## 2024-05-29 PROCEDURE — 99203 OFFICE O/P NEW LOW 30 MIN: CPT | Performed by: PODIATRIST

## 2024-05-29 PROCEDURE — 11755 BIOPSY NAIL UNIT: CPT | Performed by: PODIATRIST

## 2024-06-10 NOTE — PROGRESS NOTES
Chief Complaint   Patient presents with    New Patient    Nail Problem    Numbness    Tingling       /65 (Site: Left Upper Arm, Position: Sitting, Cuff Size: Medium Adult)   Pulse 74   Temp 98.7 °F (37.1 °C) (Temporal)   Ht 1.626 m (5' 4\")   Wt 55.9 kg (123 lb 3.2 oz)   SpO2 94%   BMI 21.15 kg/m²     1. Have you been to the ER, urgent care clinic since your last visit?  Hospitalized since your last visit? [facility] fall     2. Have you seen or consulted any other health care providers outside of the [facility] since your last visit?  Include any pap smears or colon screening. No    
was determined that a biopsy of the nail unit would be taken for diagnostic purposes. A small portion of the nail unit (eg, plate, bed, matrix, hyponychium, proximal and lateral nail folds) was sharply removed and sent to pathology for analysis. Anesthesia and hemostasis was utilized as needed. Wound care performed as needed. Pt tolerated well. Instructed pt that when pathology results return, will discuss tx options in more depth.            Augustin Woodruff, WILLIS, CWSP, AACFAS    [facility]  [address], [place] [address]  O: [phone]  F: [phone]    [facility]  [address]  [place] [address]  O: [phone]  F: [phone]    [facility]  [address], [address]  [place] [address]  O: [phone]  F: [phone]    * Available via [employer] 24/7

## 2024-06-14 ENCOUNTER — OFFICE VISIT (OUTPATIENT)
Dept: PRIMARY CARE CLINIC | Facility: CLINIC | Age: 89
End: 2024-06-14
Payer: COMMERCIAL

## 2024-06-14 VITALS
HEART RATE: 78 BPM | RESPIRATION RATE: 16 BRPM | SYSTOLIC BLOOD PRESSURE: 105 MMHG | WEIGHT: 119.4 LBS | BODY MASS INDEX: 21.16 KG/M2 | DIASTOLIC BLOOD PRESSURE: 59 MMHG | TEMPERATURE: 97.5 F | OXYGEN SATURATION: 98 % | HEIGHT: 63 IN

## 2024-06-14 DIAGNOSIS — S12.501D CLOSED NONDISPLACED FRACTURE OF SIXTH CERVICAL VERTEBRA WITH ROUTINE HEALING, UNSPECIFIED FRACTURE MORPHOLOGY, SUBSEQUENT ENCOUNTER: Primary | ICD-10-CM

## 2024-06-14 PROCEDURE — 1123F ACP DISCUSS/DSCN MKR DOCD: CPT | Performed by: FAMILY MEDICINE

## 2024-06-14 PROCEDURE — 99212 OFFICE O/P EST SF 10 MIN: CPT | Performed by: FAMILY MEDICINE

## 2024-06-14 PROCEDURE — 1090F PRES/ABSN URINE INCON ASSESS: CPT | Performed by: FAMILY MEDICINE

## 2024-06-14 PROCEDURE — G8427 DOCREV CUR MEDS BY ELIG CLIN: HCPCS | Performed by: FAMILY MEDICINE

## 2024-06-14 PROCEDURE — 1036F TOBACCO NON-USER: CPT | Performed by: FAMILY MEDICINE

## 2024-06-14 PROCEDURE — G8420 CALC BMI NORM PARAMETERS: HCPCS | Performed by: FAMILY MEDICINE

## 2024-06-14 NOTE — PROGRESS NOTES
Subjective  Chief Complaint   Patient presents with    Follow-up     3 month     HPI:  Isidra White is a 89 y.o. female.  Cervical fracture-patient still Ortho recently, who recommended keeping the collar on for total of 3 months.  Patient is tolerating it a little better in terms of skin reaction, and does not need cortisone anymore.  However she has had her activities reduced due to limitations the collar imposes.  She is still doing her regular upper and lower body workouts however.  She has lost some weight due to the reduced appetite from lack of activity.      Past Medical History:   Diagnosis Date    CAD (coronary artery disease)     ABLATION    Hyperlipidemia     Hypertension     Skin cancer     Uterine cancer (HCC) 1998     Current Outpatient Medications on File Prior to Visit   Medication Sig Dispense Refill    gabapentin (NEURONTIN) 100 MG capsule Take 1 capsule by mouth daily.      methocarbamol (ROBAXIN) 500 MG tablet Take 1 tablet by mouth 2 times daily      Catheters MISC Apply 1 external pure wick catheter nightly. 90 each 3    carvedilol (COREG) 3.125 MG tablet Take 1 tablet by mouth 2 times daily (with meals)      cetirizine (ZYRTEC) 10 MG tablet Take 1 tablet by mouth daily      furosemide (LASIX) 40 MG tablet Take 1 tablet by mouth daily      losartan (COZAAR) 25 MG tablet Take 0.5 tablets by mouth daily      pantoprazole (PROTONIX) 40 MG tablet Take 1 tablet by mouth daily as needed      spironolactone (ALDACTONE) 25 MG tablet Take 0.5 tablets by mouth daily      apixaban (ELIQUIS) 5 MG TABS tablet Take 0.5 tablets by mouth 2 times daily       No current facility-administered medications on file prior to visit.     Allergies   Allergen Reactions    Codeine Other (See Comments)    Metoclopramide Other (See Comments)     States make her paranoid    Shellfish-Derived Products Hives     shellfish    Sulfa Antibiotics Other (See Comments)     Isn't able to recall reaction at this time.

## 2024-06-14 NOTE — ASSESSMENT & PLAN NOTE
Monitored by specialist- no acute findings meriting change in the plan.  Patient has lost some weight due to inactivity reducing her appetite.  We will check back a few weeks after she gets her collar off to see if she is bouncing back.

## 2024-06-17 ENCOUNTER — TELEPHONE (OUTPATIENT)
Age: 89
End: 2024-06-17

## 2024-06-17 NOTE — TELEPHONE ENCOUNTER
Patient came in today for their appointment and claimed they never received a phone call/voicemail regarding cancelled appointment.   Patients daughter would like for one of the  team members to give them a call

## 2024-07-16 ENCOUNTER — OFFICE VISIT (OUTPATIENT)
Age: 89
End: 2024-07-16
Payer: COMMERCIAL

## 2024-07-16 VITALS
BODY MASS INDEX: 20.38 KG/M2 | TEMPERATURE: 97.3 F | HEART RATE: 74 BPM | DIASTOLIC BLOOD PRESSURE: 60 MMHG | RESPIRATION RATE: 16 BRPM | HEIGHT: 63 IN | OXYGEN SATURATION: 96 % | WEIGHT: 115 LBS | SYSTOLIC BLOOD PRESSURE: 95 MMHG

## 2024-07-16 DIAGNOSIS — D70.8 OTHER NEUTROPENIA (HCC): Primary | ICD-10-CM

## 2024-07-16 DIAGNOSIS — D64.9 NORMOCYTIC ANEMIA, NOT DUE TO BLOOD LOSS: ICD-10-CM

## 2024-07-16 DIAGNOSIS — R76.8 RHEUMATOID FACTOR POSITIVE: ICD-10-CM

## 2024-07-16 DIAGNOSIS — I50.22 CHRONIC SYSTOLIC (CONGESTIVE) HEART FAILURE (HCC): ICD-10-CM

## 2024-07-16 DIAGNOSIS — I48.0 PAROXYSMAL ATRIAL FIBRILLATION (HCC): ICD-10-CM

## 2024-07-16 PROBLEM — N18.31 CHRONIC KIDNEY DISEASE, STAGE 3A (HCC): Status: ACTIVE | Noted: 2024-07-16

## 2024-07-16 LAB
COMMENT:: NORMAL
SPECIMEN HOLD: NORMAL

## 2024-07-16 PROCEDURE — 1123F ACP DISCUSS/DSCN MKR DOCD: CPT | Performed by: INTERNAL MEDICINE

## 2024-07-16 PROCEDURE — G8427 DOCREV CUR MEDS BY ELIG CLIN: HCPCS | Performed by: INTERNAL MEDICINE

## 2024-07-16 PROCEDURE — 1090F PRES/ABSN URINE INCON ASSESS: CPT | Performed by: INTERNAL MEDICINE

## 2024-07-16 PROCEDURE — 99204 OFFICE O/P NEW MOD 45 MIN: CPT | Performed by: INTERNAL MEDICINE

## 2024-07-16 PROCEDURE — G8420 CALC BMI NORM PARAMETERS: HCPCS | Performed by: INTERNAL MEDICINE

## 2024-07-16 PROCEDURE — 1036F TOBACCO NON-USER: CPT | Performed by: INTERNAL MEDICINE

## 2024-07-16 ASSESSMENT — PATIENT HEALTH QUESTIONNAIRE - PHQ9
SUM OF ALL RESPONSES TO PHQ QUESTIONS 1-9: 0
SUM OF ALL RESPONSES TO PHQ QUESTIONS 1-9: 0
1. LITTLE INTEREST OR PLEASURE IN DOING THINGS: NOT AT ALL
SUM OF ALL RESPONSES TO PHQ9 QUESTIONS 1 & 2: 0
SUM OF ALL RESPONSES TO PHQ QUESTIONS 1-9: 0
2. FEELING DOWN, DEPRESSED OR HOPELESS: NOT AT ALL
SUM OF ALL RESPONSES TO PHQ QUESTIONS 1-9: 0

## 2024-07-16 NOTE — PROGRESS NOTES
[facility]  Medical Oncology at [place]  [phone]    Hematology / Oncology Consult    Reason for Visit:   Isidra White is a 89 y.o. female who is seen in consultation at the request of Dr. Conchis Chanel for evaluation of \"thrombocytopenia\"    History of Present Illness:   Isidra White is a 89 y.o. female who is  presenting for evaluation of thrombocytopenia, leukoopenia, anemia. (Referral note states thrombocytopenia, but review of outside notes states leukopenia, anemia.)  She has a history of chronic medical conditions including paroxysmal atrial fibrillation, chronic systolic heart failure, hypertension, and mitral valve insufficiency. Additionally, she has osteoarthritis. Reports unintentional weight loss with approx 9 lb weight loss over 2 months after a C-spine fracture. She is currently wearing a C-collar and stays active with PT. Denies lymphadenopathy, chronic rashes, recurrent infections.   Is accompanied by her son today.       Past Medical History:   Diagnosis Date    C6 cervical fracture (HCC)     C7 cervical fracture (HCC)     CAD (coronary artery disease)     ABLATION    Hyperlipidemia     Hypertension     Skin cancer     Uterine cancer (HCC) 1998      Past Surgical History:   Procedure Laterality Date    HYSTERECTOMY (CERVIX STATUS UNKNOWN)      MITRAL VALVE SURGERY      PACEMAKER        Social History     Tobacco Use    Smoking status: Never    Smokeless tobacco: Never   Substance Use Topics    Alcohol use: No      Family History   Problem Relation Age of Onset    Tuberculosis Mother     Tuberculosis Father      Current Outpatient Medications   Medication Sig    gabapentin (NEURONTIN) 100 MG capsule Take 1 capsule by mouth daily.    methocarbamol (ROBAXIN) 500 MG tablet Take 1 tablet by mouth 2 times daily    carvedilol (COREG) 3.125 MG tablet Take 1 tablet by mouth 2 times daily (with meals)    cetirizine (ZYRTEC) 10 MG tablet Take 1 tablet by mouth daily

## 2024-07-16 NOTE — PROGRESS NOTES
Chief Complaint   Patient presents with    New Patient           Vitals:    07/16/24 1147   BP: 95/60   Pulse: 74   Resp: 16   Temp: 97.3 °F (36.3 °C)   SpO2: 96%            1. Have you been to the ER, urgent care clinic since your last visit?  Hospitalized since your last visit?  New Patient  2. Have you seen or consulted any other health care providers outside of the [facility] since your last visit?  Include any pap smears or colon screening. New Patient

## 2024-07-17 LAB
BASOPHILS # BLD: 0 K/UL (ref 0–0.1)
BASOPHILS NFR BLD: 1 % (ref 0–1)
DIFFERENTIAL METHOD BLD: ABNORMAL
EOSINOPHIL # BLD: 0 K/UL (ref 0–0.4)
EOSINOPHIL NFR BLD: 1 % (ref 0–7)
ERYTHROCYTE [DISTWIDTH] IN BLOOD BY AUTOMATED COUNT: 13.2 % (ref 11.5–14.5)
FERRITIN SERPL-MCNC: 39 NG/ML (ref 26–388)
FOLATE SERPL-MCNC: 40.3 NG/ML (ref 5–21)
HCT VFR BLD AUTO: 34.2 % (ref 35–47)
HGB BLD-MCNC: 11.5 G/DL (ref 11.5–16)
IMM GRANULOCYTES # BLD AUTO: 0 K/UL (ref 0–0.04)
IMM GRANULOCYTES NFR BLD AUTO: 1 % (ref 0–0.5)
IRON SATN MFR SERPL: 27 % (ref 20–50)
IRON SERPL-MCNC: 79 UG/DL (ref 35–150)
LYMPHOCYTES # BLD: 1.3 K/UL (ref 0.8–3.5)
LYMPHOCYTES NFR BLD: 43 % (ref 12–49)
MCH RBC QN AUTO: 30.4 PG (ref 26–34)
MCHC RBC AUTO-ENTMCNC: 33.6 G/DL (ref 30–36.5)
MCV RBC AUTO: 90.5 FL (ref 80–99)
MONOCYTES # BLD: 0.4 K/UL (ref 0–1)
MONOCYTES NFR BLD: 12 % (ref 5–13)
NEUTS SEG # BLD: 1.2 K/UL (ref 1.8–8)
NEUTS SEG NFR BLD: 42 % (ref 32–75)
NRBC # BLD: 0 K/UL (ref 0–0.01)
NRBC BLD-RTO: 0 PER 100 WBC
PERIPHERAL SMEAR, MD REVIEW: NORMAL
PLATELET # BLD AUTO: 187 K/UL (ref 150–400)
PMV BLD AUTO: 10.1 FL (ref 8.9–12.9)
RBC # BLD AUTO: 3.78 M/UL (ref 3.8–5.2)
RETICS # AUTO: 0.04 M/UL (ref 0.02–0.08)
RETICS/RBC NFR AUTO: 1 % (ref 0.7–2.1)
TIBC SERPL-MCNC: 290 UG/DL (ref 250–450)
VIT B12 SERPL-MCNC: 1532 PG/ML (ref 193–986)
WBC # BLD AUTO: 2.9 K/UL (ref 3.6–11)

## 2024-07-18 LAB — COPPER SERPL-MCNC: 101 UG/DL (ref 80–158)

## 2024-07-19 LAB — METHYLMALONATE SERPL-SCNC: 230 NMOL/L (ref 0–378)

## 2024-08-15 ENCOUNTER — OFFICE VISIT (OUTPATIENT)
Dept: PRIMARY CARE CLINIC | Facility: CLINIC | Age: 89
End: 2024-08-15
Payer: COMMERCIAL

## 2024-08-15 ENCOUNTER — PATIENT MESSAGE (OUTPATIENT)
Dept: PRIMARY CARE CLINIC | Facility: CLINIC | Age: 89
End: 2024-08-15

## 2024-08-15 VITALS
DIASTOLIC BLOOD PRESSURE: 64 MMHG | WEIGHT: 116.2 LBS | TEMPERATURE: 97.3 F | BODY MASS INDEX: 20.58 KG/M2 | RESPIRATION RATE: 16 BRPM | OXYGEN SATURATION: 98 % | HEART RATE: 82 BPM | SYSTOLIC BLOOD PRESSURE: 107 MMHG

## 2024-08-15 DIAGNOSIS — N18.31 CHRONIC KIDNEY DISEASE, STAGE 3A (HCC): ICD-10-CM

## 2024-08-15 DIAGNOSIS — S12.501D CLOSED NONDISPLACED FRACTURE OF SIXTH CERVICAL VERTEBRA WITH ROUTINE HEALING, UNSPECIFIED FRACTURE MORPHOLOGY, SUBSEQUENT ENCOUNTER: ICD-10-CM

## 2024-08-15 DIAGNOSIS — I50.23 ACUTE ON CHRONIC SYSTOLIC (CONGESTIVE) HEART FAILURE (HCC): ICD-10-CM

## 2024-08-15 DIAGNOSIS — Z91.09 ENVIRONMENTAL ALLERGIES: Primary | ICD-10-CM

## 2024-08-15 PROCEDURE — 99214 OFFICE O/P EST MOD 30 MIN: CPT | Performed by: FAMILY MEDICINE

## 2024-08-15 PROCEDURE — 1123F ACP DISCUSS/DSCN MKR DOCD: CPT | Performed by: FAMILY MEDICINE

## 2024-08-15 PROCEDURE — G8427 DOCREV CUR MEDS BY ELIG CLIN: HCPCS | Performed by: FAMILY MEDICINE

## 2024-08-15 PROCEDURE — 1036F TOBACCO NON-USER: CPT | Performed by: FAMILY MEDICINE

## 2024-08-15 PROCEDURE — G8420 CALC BMI NORM PARAMETERS: HCPCS | Performed by: FAMILY MEDICINE

## 2024-08-15 PROCEDURE — 1090F PRES/ABSN URINE INCON ASSESS: CPT | Performed by: FAMILY MEDICINE

## 2024-08-15 RX ORDER — IPRATROPIUM BROMIDE 42 UG/1
2 SPRAY, METERED NASAL 2 TIMES DAILY PRN
Qty: 15 ML | Refills: 11 | Status: SHIPPED | OUTPATIENT
Start: 2024-08-15

## 2024-08-15 NOTE — ASSESSMENT & PLAN NOTE
Daughter reports she was on a nasal spray which she was taking before breakfast that helped reduce the coughing she did have during meals.  Daughter will send message to picture of her medications I can send in a refill.

## 2024-08-15 NOTE — TELEPHONE ENCOUNTER
Ipratropium nasal spray sent to [facility] on [address] Your welcome! I enjoy having your mother as a patient!

## 2024-08-15 NOTE — ASSESSMENT & PLAN NOTE
Patient and daughter note some feelings of weakness in the morning.  Given patient's weight loss and low blood pressure, I am concerned that she has hypotension.  We will change Lasix to as needed for edema.  May consider stopping spironolactone in future.

## 2024-08-15 NOTE — ASSESSMENT & PLAN NOTE
Patient daughter reports that spine is healing well.  Patient just has to wear her brace at night currently.

## 2024-11-21 ENCOUNTER — OFFICE VISIT (OUTPATIENT)
Dept: PRIMARY CARE CLINIC | Facility: CLINIC | Age: 89
End: 2024-11-21

## 2024-11-21 VITALS
BODY MASS INDEX: 19.99 KG/M2 | TEMPERATURE: 98.6 F | HEIGHT: 63 IN | DIASTOLIC BLOOD PRESSURE: 70 MMHG | OXYGEN SATURATION: 97 % | WEIGHT: 112.8 LBS | SYSTOLIC BLOOD PRESSURE: 110 MMHG | HEART RATE: 80 BPM

## 2024-11-21 DIAGNOSIS — I50.9 CHRONIC CONGESTIVE HEART FAILURE, UNSPECIFIED HEART FAILURE TYPE (HCC): ICD-10-CM

## 2024-11-21 DIAGNOSIS — C50.911 INVASIVE DUCTAL CARCINOMA OF BREAST, FEMALE, RIGHT (HCC): ICD-10-CM

## 2024-11-21 DIAGNOSIS — C44.81 BASAL CELL CARCINOMA (BCC) OF OVERLAPPING SITES OF SKIN: ICD-10-CM

## 2024-11-21 DIAGNOSIS — E87.6 HYPOKALEMIA: ICD-10-CM

## 2024-11-21 DIAGNOSIS — Z00.00 MEDICARE ANNUAL WELLNESS VISIT, SUBSEQUENT: Primary | ICD-10-CM

## 2024-11-21 PROBLEM — R55 NEAR SYNCOPE: Status: RESOLVED | Noted: 2018-01-05 | Resolved: 2024-11-21

## 2024-11-21 PROBLEM — I50.23 ACUTE ON CHRONIC SYSTOLIC (CONGESTIVE) HEART FAILURE (HCC): Status: RESOLVED | Noted: 2024-07-16 | Resolved: 2024-11-21

## 2024-11-21 PROBLEM — I10 ESSENTIAL HYPERTENSION: Status: RESOLVED | Noted: 2018-01-04 | Resolved: 2024-11-21

## 2024-11-21 ASSESSMENT — PATIENT HEALTH QUESTIONNAIRE - PHQ9
SUM OF ALL RESPONSES TO PHQ QUESTIONS 1-9: 0
SUM OF ALL RESPONSES TO PHQ QUESTIONS 1-9: 0
2. FEELING DOWN, DEPRESSED OR HOPELESS: NOT AT ALL
SUM OF ALL RESPONSES TO PHQ9 QUESTIONS 1 & 2: 0
1. LITTLE INTEREST OR PLEASURE IN DOING THINGS: NOT AT ALL
SUM OF ALL RESPONSES TO PHQ QUESTIONS 1-9: 0
SUM OF ALL RESPONSES TO PHQ QUESTIONS 1-9: 0

## 2024-11-21 NOTE — PATIENT INSTRUCTIONS
[employer].   Care instructions adapted under license by [facility]. If you have questions about a medical condition or this instruction, always ask your healthcare professional. [employer] disclaims any warranty or liability for your use of this information.           Learning About Activities of Daily Living  What are activities of daily living?     Activities of daily living (ADLs) are the basic self-care tasks you do every day. These include eating, bathing, dressing, and moving around.  As you age, and if you have health problems, you may find that it's harder to do some of these tasks. If so, your doctor can suggest ideas that may help.  To measure what kind of help you may need, your doctor will ask how well you are able to do ADLs. Let your doctor know if there are any tasks that you are having trouble doing. This is an important first step to getting help. And when you have the help you need, you can stay as independent as possible.  How will a doctor assess your ADLs?  Asking about ADLs is part of a routine health checkup your doctor will likely do as you age. Your health check might be done in a doctor's office, in your home, or at a hospital. The goal is to find out if you are having any problems that could make it hard to care for yourself or that make it unsafe for you to be on your own.  To measure your ADLs, your doctor will ask how hard it is for you to do routine tasks. Your doctor may also want to know if you have changed the way you do a task because of a health problem. Your doctor may watch how you:  Walk back and forth.  Keep your balance while you stand or walk.  Move from sitting to standing or from a bed to a chair.  Button or unbutton a shirt or sweater.  Remove and put on your shoes.  It's common to feel a little worried or anxious if you find you can't do all the things you used to be able to do. Talking with your doctor about ADLs is a way to make sure you're as

## 2024-11-21 NOTE — ASSESSMENT & PLAN NOTE
History of multiple lesions and multiple removals.  S/p excision and cautery on apical scalp earlier this month.  Continue follow-up with dermatology.

## 2024-11-21 NOTE — ASSESSMENT & PLAN NOTE
Status postlumpectomy and 5 radiation treatments.  Patient has had a few pounds of weight loss as well as some fatigue.  Hopefully done with radiation.  Continue to follow with heme-onc

## 2024-11-21 NOTE — ASSESSMENT & PLAN NOTE
Patient has been having low normal blood pressures secondary to weight loss and radiation treatment.  Patient has follow-up with cardiology next week.  Advised her to discuss possibly reducing spironolactone or losartan to half a pill.

## 2024-11-21 NOTE — PROGRESS NOTES
Medicare Annual Wellness Visit    Isidra Urias Christopher is here for Medicare AWV    Assessment & Plan   Medicare annual wellness visit, subsequent  Chronic congestive heart failure, unspecified heart failure type (HCC)  Assessment & Plan:  Patient has been having low normal blood pressures secondary to weight loss and radiation treatment.  Patient has follow-up with cardiology next week.  Advised her to discuss possibly reducing spironolactone or losartan to half a pill.  Orders:  -     Comprehensive Metabolic Panel  -     CBC with Auto Differential  Hypokalemia  Assessment & Plan:   Patient is cramps.  Will recheck electrolytes and magnesium  Orders:  -     Magnesium  Basal cell carcinoma (BCC) of overlapping sites of skin  Assessment & Plan:   History of multiple lesions and multiple removals.  S/p excision and cautery on apical scalp earlier this month.  Continue follow-up with dermatology.  Invasive ductal carcinoma of breast, female, right (HCC)  Assessment & Plan:   Status postlumpectomy and 5 radiation treatments.  Patient has had a few pounds of weight loss as well as some fatigue.  Hopefully done with radiation.  Continue to follow with heme-onc  Recommendations for Preventive Services Due: see orders and patient instructions/AVS.  Recommended screening schedule for the next 5-10 years is provided to the patient in written form: see Patient Instructions/AVS.     Return in 6 months (on 5/21/2025).     Subjective   Breast cancer-Status postlumpectomy and 5 radiation treatments. Patient has had a few pounds of weight loss as well as some fatigue.  Has follow-up next month with heme-onc    Basal cell cancer-reports many tumors over her life which have been removed.  Most recent one was earlier this month on the apex of her scalp.  It was shaved off and then cauterized.  She reports she is healing well from the cautery.    Patient reports her blood pressure has been decreasing and has been 90s over 50s on a few

## 2024-11-22 LAB
ALBUMIN SERPL-MCNC: 4 G/DL (ref 3.6–4.6)
ALP SERPL-CCNC: 88 IU/L (ref 44–121)
ALT SERPL-CCNC: 8 IU/L (ref 0–32)
AST SERPL-CCNC: 17 IU/L (ref 0–40)
BILIRUB SERPL-MCNC: 0.5 MG/DL (ref 0–1.2)
BUN SERPL-MCNC: 21 MG/DL (ref 10–36)
BUN/CREAT SERPL: 22 (ref 12–28)
CALCIUM SERPL-MCNC: 9.2 MG/DL (ref 8.7–10.3)
CHLORIDE SERPL-SCNC: 95 MMOL/L (ref 96–106)
CO2 SERPL-SCNC: 27 MMOL/L (ref 20–29)
CREAT SERPL-MCNC: 0.95 MG/DL (ref 0.57–1)
EGFRCR SERPLBLD CKD-EPI 2021: 57 ML/MIN/1.73
GLOBULIN SER CALC-MCNC: 3.2 G/DL (ref 1.5–4.5)
GLUCOSE SERPL-MCNC: 106 MG/DL (ref 70–99)
MAGNESIUM SERPL-MCNC: 2.3 MG/DL (ref 1.6–2.3)
POTASSIUM SERPL-SCNC: 4 MMOL/L (ref 3.5–5.2)
PROT SERPL-MCNC: 7.2 G/DL (ref 6–8.5)
SODIUM SERPL-SCNC: 137 MMOL/L (ref 134–144)

## 2024-11-23 LAB
BASOPHILS # BLD AUTO: 0 X10E3/UL (ref 0–0.2)
BASOPHILS NFR BLD AUTO: 0 %
EOSINOPHIL # BLD AUTO: 0 X10E3/UL (ref 0–0.4)
EOSINOPHIL NFR BLD AUTO: 1 %
ERYTHROCYTE [DISTWIDTH] IN BLOOD BY AUTOMATED COUNT: 12.2 % (ref 11.7–15.4)
HCT VFR BLD AUTO: 34.7 % (ref 34–46.6)
HGB BLD-MCNC: 11.8 G/DL (ref 11.1–15.9)
IMM GRANULOCYTES # BLD AUTO: 0 X10E3/UL (ref 0–0.1)
IMM GRANULOCYTES NFR BLD AUTO: 0 %
LYMPHOCYTES # BLD AUTO: 1.1 X10E3/UL (ref 0.7–3.1)
LYMPHOCYTES NFR BLD AUTO: 46 %
MCH RBC QN AUTO: 30.4 PG (ref 26.6–33)
MCHC RBC AUTO-ENTMCNC: 34 G/DL (ref 31.5–35.7)
MCV RBC AUTO: 89 FL (ref 79–97)
MONOCYTES # BLD AUTO: 0.3 X10E3/UL (ref 0.1–0.9)
MONOCYTES NFR BLD AUTO: 13 %
MORPHOLOGY BLD-IMP: ABNORMAL
NEUTROPHILS # BLD AUTO: 1 X10E3/UL (ref 1.4–7)
NEUTROPHILS NFR BLD AUTO: 40 %
PLATELET # BLD AUTO: 183 X10E3/UL (ref 150–450)
RBC # BLD AUTO: 3.88 X10E6/UL (ref 3.77–5.28)
WBC # BLD AUTO: 2.5 X10E3/UL (ref 3.4–10.8)

## 2025-05-22 SDOH — ECONOMIC STABILITY: INCOME INSECURITY: IN THE LAST 12 MONTHS, WAS THERE A TIME WHEN YOU WERE NOT ABLE TO PAY THE MORTGAGE OR RENT ON TIME?: NO

## 2025-05-22 SDOH — ECONOMIC STABILITY: FOOD INSECURITY: WITHIN THE PAST 12 MONTHS, YOU WORRIED THAT YOUR FOOD WOULD RUN OUT BEFORE YOU GOT MONEY TO BUY MORE.: NEVER TRUE

## 2025-05-22 SDOH — ECONOMIC STABILITY: FOOD INSECURITY: WITHIN THE PAST 12 MONTHS, THE FOOD YOU BOUGHT JUST DIDN'T LAST AND YOU DIDN'T HAVE MONEY TO GET MORE.: SOMETIMES TRUE

## 2025-05-22 SDOH — ECONOMIC STABILITY: TRANSPORTATION INSECURITY
IN THE PAST 12 MONTHS, HAS THE LACK OF TRANSPORTATION KEPT YOU FROM MEDICAL APPOINTMENTS OR FROM GETTING MEDICATIONS?: NO

## 2025-05-22 SDOH — ECONOMIC STABILITY: TRANSPORTATION INSECURITY
IN THE PAST 12 MONTHS, HAS LACK OF TRANSPORTATION KEPT YOU FROM MEETINGS, WORK, OR FROM GETTING THINGS NEEDED FOR DAILY LIVING?: NO

## 2025-05-23 ENCOUNTER — OFFICE VISIT (OUTPATIENT)
Dept: PRIMARY CARE CLINIC | Facility: CLINIC | Age: 89
End: 2025-05-23
Payer: COMMERCIAL

## 2025-05-23 VITALS
OXYGEN SATURATION: 95 % | TEMPERATURE: 97.7 F | HEIGHT: 63 IN | BODY MASS INDEX: 19.88 KG/M2 | SYSTOLIC BLOOD PRESSURE: 111 MMHG | WEIGHT: 112.2 LBS | HEART RATE: 71 BPM | DIASTOLIC BLOOD PRESSURE: 66 MMHG

## 2025-05-23 DIAGNOSIS — L60.3 DYSTROPHIC NAIL: ICD-10-CM

## 2025-05-23 DIAGNOSIS — R29.6 FALLS: ICD-10-CM

## 2025-05-23 DIAGNOSIS — C50.911 INVASIVE DUCTAL CARCINOMA OF BREAST, FEMALE, RIGHT (HCC): Primary | ICD-10-CM

## 2025-05-23 PROCEDURE — G8427 DOCREV CUR MEDS BY ELIG CLIN: HCPCS | Performed by: FAMILY MEDICINE

## 2025-05-23 PROCEDURE — 99213 OFFICE O/P EST LOW 20 MIN: CPT | Performed by: FAMILY MEDICINE

## 2025-05-23 PROCEDURE — G8420 CALC BMI NORM PARAMETERS: HCPCS | Performed by: FAMILY MEDICINE

## 2025-05-23 PROCEDURE — 1090F PRES/ABSN URINE INCON ASSESS: CPT | Performed by: FAMILY MEDICINE

## 2025-05-23 PROCEDURE — 1123F ACP DISCUSS/DSCN MKR DOCD: CPT | Performed by: FAMILY MEDICINE

## 2025-05-23 PROCEDURE — 1036F TOBACCO NON-USER: CPT | Performed by: FAMILY MEDICINE

## 2025-05-23 RX ORDER — LETROZOLE 2.5 MG/1
2.5 TABLET, FILM COATED ORAL DAILY
COMMUNITY
Start: 2025-04-23

## 2025-05-23 ASSESSMENT — PATIENT HEALTH QUESTIONNAIRE - PHQ9
1. LITTLE INTEREST OR PLEASURE IN DOING THINGS: NOT AT ALL
SUM OF ALL RESPONSES TO PHQ QUESTIONS 1-9: 0
2. FEELING DOWN, DEPRESSED OR HOPELESS: NOT AT ALL

## 2025-05-27 NOTE — PROGRESS NOTES
Have you been to the ER, urgent care clinic since your last visit?  Hospitalized since your last visit?   YES - When: approximately 1 days ago.  Where and Why: went to [facility] for fall.    Have you seen or consulted any other health care providers outside our system since your last visit?   NO           
cervical fracture (HCC)     CAD (coronary artery disease)     ABLATION    Hyperlipidemia     Hypertension     Skin cancer     Uterine cancer (HCC) 1998     Current Outpatient Medications on File Prior to Visit   Medication Sig Dispense Refill    letrozole (FEMARA) 2.5 MG tablet Take 1 tablet by mouth daily      ipratropium (ATROVENT) 0.06 % nasal spray 2 sprays by Each Nostril route 2 times daily as needed (for coughing while eating) 15 mL 11    gabapentin (NEURONTIN) 100 MG capsule Take 1 capsule by mouth daily.      methocarbamol (ROBAXIN) 500 MG tablet Take 1 tablet by mouth 2 times daily      Catheters MISC Apply 1 external pure wick catheter nightly. 90 each 3    carvedilol (COREG) 3.125 MG tablet Take 1 tablet by mouth 2 times daily (with meals)      cetirizine (ZYRTEC) 10 MG tablet Take 1 tablet by mouth daily      furosemide (LASIX) 40 MG tablet Take 1 tablet by mouth daily as needed (edema)      losartan (COZAAR) 25 MG tablet Take 0.5 tablets by mouth daily      pantoprazole (PROTONIX) 40 MG tablet Take 1 tablet by mouth daily as needed      apixaban (ELIQUIS) 5 MG TABS tablet Take 0.5 tablets by mouth 2 times daily       No current facility-administered medications on file prior to visit.     Allergies   Allergen Reactions    Codeine Other (See Comments)    Metoclopramide Other (See Comments)     States make her paranoid    Shellfish-Derived Products Hives     shellfish    Sulfa Antibiotics Other (See Comments)     Isn't able to recall reaction at this time.     Valsartan Dizziness or Vertigo, Myalgia and Nausea And Vomiting       Objective  Vitals:    05/23/25 1343   BP: 111/66   Pulse: 71   Temp: 97.7 °F (36.5 °C)   SpO2: 95%     Body mass index is 19.88 kg/m².   Wt Readings from Last 3 Encounters:   05/23/25 50.9 kg (112 lb 3.2 oz)   11/21/24 51.2 kg (112 lb 12.8 oz)   08/15/24 52.7 kg (116 lb 3.2 oz)     Physical Exam  Constitutional:       Appearance: Normal appearance.   HENT:      Head:

## 2025-05-28 ENCOUNTER — TELEPHONE (OUTPATIENT)
Dept: PRIMARY CARE CLINIC | Facility: CLINIC | Age: 89
End: 2025-05-28

## 2025-06-06 ENCOUNTER — TELEPHONE (OUTPATIENT)
Dept: PRIMARY CARE CLINIC | Facility: CLINIC | Age: 89
End: 2025-06-06

## 2025-06-06 NOTE — TELEPHONE ENCOUNTER
Pt child called stating that the dermatologist has said that the patient has symptoms of MRSA. Pt child would like to get a second opinion/get pt tested before seeking treatment stating that, if true, pt would be in and out of the hospital due to their age.     Please advise whether or not pt can get the test done at office, or somewhere else.

## 2025-08-05 ENCOUNTER — TELEPHONE (OUTPATIENT)
Dept: PRIMARY CARE CLINIC | Facility: CLINIC | Age: 89
End: 2025-08-05

## 2025-08-08 ENCOUNTER — OFFICE VISIT (OUTPATIENT)
Dept: PRIMARY CARE CLINIC | Facility: CLINIC | Age: 89
End: 2025-08-08

## 2025-08-08 VITALS
DIASTOLIC BLOOD PRESSURE: 72 MMHG | HEIGHT: 63 IN | BODY MASS INDEX: 19 KG/M2 | WEIGHT: 107.2 LBS | SYSTOLIC BLOOD PRESSURE: 120 MMHG | HEART RATE: 73 BPM | TEMPERATURE: 97.8 F | OXYGEN SATURATION: 94 %

## 2025-08-08 DIAGNOSIS — R63.4 UNINTENTIONAL WEIGHT LOSS: ICD-10-CM
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
## (undated) DEVICE — CANNULA NSL O2 AD 7 FT END-TIDAL CARBON DIOX VENTFLO

## (undated) DEVICE — THE ENDO CARRY-ON PROCEDURE KIT CONTAINS ALL OF THE SUPPLIES AND INFECTION PREVENTION PRODUCTS NEEDED FOR ENDOSCOPIC PROCEDURES: Brand: ENDO CARRY-ON PROCEDURE KIT

## (undated) DEVICE — FORCEPS BX L240CM JAW DIA2.8MM L CAP W/ NDL MIC MESH TOOTH

## (undated) DEVICE — MASK ANES INF SZ 2 PREM TAIL VLV INFL PRT UNSCENTED SGL PT

## (undated) DEVICE — MOUTHPIECE ENDOSCP 20X27MM --

## (undated) DEVICE — PR.VLV.MD ADLT,CO2&MANO: Brand: SPUR® II ADULT RESUSCITATORW/CO2 DETECTOR